# Patient Record
Sex: FEMALE | Race: WHITE | NOT HISPANIC OR LATINO | Employment: OTHER | URBAN - METROPOLITAN AREA
[De-identification: names, ages, dates, MRNs, and addresses within clinical notes are randomized per-mention and may not be internally consistent; named-entity substitution may affect disease eponyms.]

---

## 2017-01-23 ENCOUNTER — TRANSCRIBE ORDERS (OUTPATIENT)
Dept: ADMINISTRATIVE | Facility: HOSPITAL | Age: 67
End: 2017-01-23

## 2017-01-23 DIAGNOSIS — R01.1 MURMUR: Primary | ICD-10-CM

## 2017-01-26 ENCOUNTER — HOSPITAL ENCOUNTER (OUTPATIENT)
Dept: NON INVASIVE DIAGNOSTICS | Facility: MEDICAL CENTER | Age: 67
Discharge: HOME | End: 2017-01-26
Payer: MEDICARE

## 2017-01-26 DIAGNOSIS — R01.1 MURMUR: ICD-10-CM

## 2017-01-26 PROCEDURE — 93306 TTE W/DOPPLER COMPLETE: CPT

## 2017-02-14 ENCOUNTER — ALLSCRIPTS OFFICE VISIT (OUTPATIENT)
Dept: OTHER | Facility: OTHER | Age: 67
End: 2017-02-14

## 2017-07-24 ENCOUNTER — TRANSCRIBE ORDERS (OUTPATIENT)
Dept: ADMINISTRATIVE | Facility: HOSPITAL | Age: 67
End: 2017-07-24

## 2017-07-24 ENCOUNTER — APPOINTMENT (OUTPATIENT)
Dept: LAB | Facility: MEDICAL CENTER | Age: 67
End: 2017-07-24
Payer: MEDICARE

## 2017-07-24 DIAGNOSIS — I10 ESSENTIAL HYPERTENSION, MALIGNANT: ICD-10-CM

## 2017-07-24 DIAGNOSIS — E78.00 PURE HYPERCHOLESTEROLEMIA: ICD-10-CM

## 2017-07-24 DIAGNOSIS — I10 ESSENTIAL HYPERTENSION, MALIGNANT: Primary | ICD-10-CM

## 2017-07-24 LAB
ALBUMIN SERPL BCP-MCNC: 3.7 G/DL (ref 3.5–5)
ALP SERPL-CCNC: 88 U/L (ref 46–116)
ALT SERPL W P-5'-P-CCNC: 32 U/L (ref 12–78)
ANION GAP SERPL CALCULATED.3IONS-SCNC: 7 MMOL/L (ref 4–13)
AST SERPL W P-5'-P-CCNC: 25 U/L (ref 5–45)
BACTERIA UR QL AUTO: ABNORMAL /HPF
BASOPHILS # BLD AUTO: 0.03 THOUSANDS/ΜL (ref 0–0.1)
BASOPHILS NFR BLD AUTO: 1 % (ref 0–1)
BILIRUB SERPL-MCNC: 1.92 MG/DL (ref 0.2–1)
BILIRUB UR QL STRIP: NEGATIVE
BUN SERPL-MCNC: 16 MG/DL (ref 5–25)
CALCIUM SERPL-MCNC: 9.3 MG/DL (ref 8.3–10.1)
CHLORIDE SERPL-SCNC: 105 MMOL/L (ref 100–108)
CHOLEST SERPL-MCNC: 204 MG/DL (ref 50–200)
CLARITY UR: ABNORMAL
CO2 SERPL-SCNC: 26 MMOL/L (ref 21–32)
COLOR UR: YELLOW
CREAT SERPL-MCNC: 0.6 MG/DL (ref 0.6–1.3)
EOSINOPHIL # BLD AUTO: 0.07 THOUSAND/ΜL (ref 0–0.61)
EOSINOPHIL NFR BLD AUTO: 1 % (ref 0–6)
ERYTHROCYTE [DISTWIDTH] IN BLOOD BY AUTOMATED COUNT: 13.7 % (ref 11.6–15.1)
ERYTHROCYTE [SEDIMENTATION RATE] IN BLOOD: 12 MM/HOUR (ref 0–20)
GFR SERPL CREATININE-BSD FRML MDRD: 95 ML/MIN/1.73SQ M
GLUCOSE P FAST SERPL-MCNC: 98 MG/DL (ref 65–99)
GLUCOSE UR STRIP-MCNC: NEGATIVE MG/DL
HCT VFR BLD AUTO: 40.1 % (ref 34.8–46.1)
HDLC SERPL-MCNC: 67 MG/DL (ref 40–60)
HGB BLD-MCNC: 13.7 G/DL (ref 11.5–15.4)
HGB UR QL STRIP.AUTO: ABNORMAL
IRON SERPL-MCNC: 101 UG/DL (ref 50–170)
KETONES UR STRIP-MCNC: NEGATIVE MG/DL
LDH SERPL-CCNC: 179 U/L (ref 81–234)
LDLC SERPL CALC-MCNC: 122 MG/DL (ref 0–100)
LEUKOCYTE ESTERASE UR QL STRIP: ABNORMAL
LYMPHOCYTES # BLD AUTO: 1.45 THOUSANDS/ΜL (ref 0.6–4.47)
LYMPHOCYTES NFR BLD AUTO: 27 % (ref 14–44)
MCH RBC QN AUTO: 31.9 PG (ref 26.8–34.3)
MCHC RBC AUTO-ENTMCNC: 34.2 G/DL (ref 31.4–37.4)
MCV RBC AUTO: 94 FL (ref 82–98)
MONOCYTES # BLD AUTO: 0.51 THOUSAND/ΜL (ref 0.17–1.22)
MONOCYTES NFR BLD AUTO: 9 % (ref 4–12)
NEUTROPHILS # BLD AUTO: 3.36 THOUSANDS/ΜL (ref 1.85–7.62)
NEUTS SEG NFR BLD AUTO: 62 % (ref 43–75)
NITRITE UR QL STRIP: NEGATIVE
NON-SQ EPI CELLS URNS QL MICRO: ABNORMAL /HPF
NRBC BLD AUTO-RTO: 0 /100 WBCS
PH UR STRIP.AUTO: 6.5 [PH] (ref 4.5–8)
PLATELET # BLD AUTO: 292 THOUSANDS/UL (ref 149–390)
PMV BLD AUTO: 10.1 FL (ref 8.9–12.7)
POTASSIUM SERPL-SCNC: 4 MMOL/L (ref 3.5–5.3)
PROT SERPL-MCNC: 7.6 G/DL (ref 6.4–8.2)
PROT UR STRIP-MCNC: NEGATIVE MG/DL
RBC # BLD AUTO: 4.29 MILLION/UL (ref 3.81–5.12)
RBC #/AREA URNS AUTO: ABNORMAL /HPF
SODIUM SERPL-SCNC: 138 MMOL/L (ref 136–145)
SP GR UR STRIP.AUTO: 1.02 (ref 1–1.03)
TRIGL SERPL-MCNC: 76 MG/DL
TSH SERPL DL<=0.05 MIU/L-ACNC: 0.62 UIU/ML (ref 0.36–3.74)
UROBILINOGEN UR QL STRIP.AUTO: 0.2 E.U./DL
WBC # BLD AUTO: 5.43 THOUSAND/UL (ref 4.31–10.16)
WBC #/AREA URNS AUTO: ABNORMAL /HPF

## 2017-07-24 PROCEDURE — 83540 ASSAY OF IRON: CPT

## 2017-07-24 PROCEDURE — 83615 LACTATE (LD) (LDH) ENZYME: CPT

## 2017-07-24 PROCEDURE — 80061 LIPID PANEL: CPT

## 2017-07-24 PROCEDURE — 85652 RBC SED RATE AUTOMATED: CPT

## 2017-07-24 PROCEDURE — 85025 COMPLETE CBC W/AUTO DIFF WBC: CPT

## 2017-07-24 PROCEDURE — 81001 URINALYSIS AUTO W/SCOPE: CPT | Performed by: INTERNAL MEDICINE

## 2017-07-24 PROCEDURE — 80053 COMPREHEN METABOLIC PANEL: CPT

## 2017-07-24 PROCEDURE — 36415 COLL VENOUS BLD VENIPUNCTURE: CPT

## 2017-07-24 PROCEDURE — 84443 ASSAY THYROID STIM HORMONE: CPT

## 2017-10-10 ENCOUNTER — TRANSCRIBE ORDERS (OUTPATIENT)
Dept: ADMINISTRATIVE | Facility: HOSPITAL | Age: 67
End: 2017-10-10

## 2017-10-10 DIAGNOSIS — R10.11 ABDOMINAL PAIN, RIGHT UPPER QUADRANT: Primary | ICD-10-CM

## 2017-10-17 ENCOUNTER — HOSPITAL ENCOUNTER (OUTPATIENT)
Dept: RADIOLOGY | Facility: MEDICAL CENTER | Age: 67
Discharge: HOME | End: 2017-10-17
Payer: MEDICARE

## 2017-10-17 DIAGNOSIS — R10.11 ABDOMINAL PAIN, RIGHT UPPER QUADRANT: ICD-10-CM

## 2017-10-17 PROCEDURE — 76705 ECHO EXAM OF ABDOMEN: CPT

## 2017-11-08 ENCOUNTER — APPOINTMENT (OUTPATIENT)
Dept: LAB | Facility: MEDICAL CENTER | Age: 67
End: 2017-11-08
Payer: MEDICARE

## 2017-11-08 ENCOUNTER — TRANSCRIBE ORDERS (OUTPATIENT)
Dept: ADMINISTRATIVE | Facility: HOSPITAL | Age: 67
End: 2017-11-08

## 2017-11-08 DIAGNOSIS — R10.11 RIGHT UPPER QUADRANT PAIN: Primary | ICD-10-CM

## 2017-11-08 LAB
ALBUMIN SERPL BCP-MCNC: 3.7 G/DL (ref 3.5–5)
ALP SERPL-CCNC: 89 U/L (ref 46–116)
ALT SERPL W P-5'-P-CCNC: 41 U/L (ref 12–78)
AST SERPL W P-5'-P-CCNC: 23 U/L (ref 5–45)
BILIRUB DIRECT SERPL-MCNC: 0.3 MG/DL (ref 0–0.2)
BILIRUB SERPL-MCNC: 1.48 MG/DL (ref 0.2–1)
PROT SERPL-MCNC: 7.5 G/DL (ref 6.4–8.2)

## 2017-11-08 PROCEDURE — 36415 COLL VENOUS BLD VENIPUNCTURE: CPT | Performed by: INTERNAL MEDICINE

## 2017-11-08 PROCEDURE — 80076 HEPATIC FUNCTION PANEL: CPT | Performed by: INTERNAL MEDICINE

## 2018-01-13 VITALS
WEIGHT: 228 LBS | HEIGHT: 63 IN | SYSTOLIC BLOOD PRESSURE: 122 MMHG | DIASTOLIC BLOOD PRESSURE: 84 MMHG | BODY MASS INDEX: 40.4 KG/M2

## 2018-04-10 ENCOUNTER — LAB (OUTPATIENT)
Dept: LAB | Facility: MEDICAL CENTER | Age: 68
End: 2018-04-10
Payer: MEDICARE

## 2018-04-10 ENCOUNTER — TRANSCRIBE ORDERS (OUTPATIENT)
Dept: ADMINISTRATIVE | Facility: HOSPITAL | Age: 68
End: 2018-04-10

## 2018-04-10 DIAGNOSIS — I10 ESSENTIAL HYPERTENSION, BENIGN: Primary | ICD-10-CM

## 2018-04-10 DIAGNOSIS — E78.00 PURE HYPERCHOLESTEROLEMIA: ICD-10-CM

## 2018-04-10 LAB
ALBUMIN SERPL BCP-MCNC: 3.8 G/DL (ref 3.5–5)
ALP SERPL-CCNC: 108 U/L (ref 46–116)
ALT SERPL W P-5'-P-CCNC: 33 U/L (ref 12–78)
ANION GAP SERPL CALCULATED.3IONS-SCNC: 3 MMOL/L (ref 4–13)
AST SERPL W P-5'-P-CCNC: 27 U/L (ref 5–45)
BACTERIA UR QL AUTO: ABNORMAL /HPF
BASOPHILS # BLD AUTO: 0.02 THOUSANDS/ΜL (ref 0–0.1)
BASOPHILS NFR BLD AUTO: 0 % (ref 0–1)
BILIRUB SERPL-MCNC: 1.4 MG/DL (ref 0.2–1)
BILIRUB UR QL STRIP: NEGATIVE
BUN SERPL-MCNC: 19 MG/DL (ref 5–25)
CALCIUM SERPL-MCNC: 9.5 MG/DL
CHLORIDE SERPL-SCNC: 108 MMOL/L (ref 100–108)
CHOLEST SERPL-MCNC: 187 MG/DL (ref 50–200)
CLARITY UR: CLEAR
CO2 SERPL-SCNC: 29 MMOL/L (ref 21–32)
COLOR UR: YELLOW
CREAT SERPL-MCNC: 0.61 MG/DL (ref 0.6–1.3)
CRP SERPL QL: <3 MG/L
EOSINOPHIL # BLD AUTO: 0.06 THOUSAND/ΜL (ref 0–0.61)
EOSINOPHIL NFR BLD AUTO: 1 % (ref 0–6)
ERYTHROCYTE [DISTWIDTH] IN BLOOD BY AUTOMATED COUNT: 13.7 % (ref 11.6–15.1)
ERYTHROCYTE [SEDIMENTATION RATE] IN BLOOD: 20 MM/HOUR (ref 0–20)
GFR SERPL CREATININE-BSD FRML MDRD: 94 ML/MIN/1.73SQ M
GLUCOSE P FAST SERPL-MCNC: 95 MG/DL (ref 65–99)
GLUCOSE UR STRIP-MCNC: NEGATIVE MG/DL
HCT VFR BLD AUTO: 43.1 % (ref 34.8–46.1)
HDLC SERPL-MCNC: 64 MG/DL (ref 40–60)
HGB BLD-MCNC: 13.9 G/DL (ref 11.5–15.4)
HGB UR QL STRIP.AUTO: ABNORMAL
HYALINE CASTS #/AREA URNS LPF: ABNORMAL /LPF
IRON SERPL-MCNC: 122 UG/DL (ref 50–170)
KETONES UR STRIP-MCNC: NEGATIVE MG/DL
LDH SERPL-CCNC: 171 U/L (ref 81–234)
LDLC SERPL CALC-MCNC: 104 MG/DL (ref 0–100)
LEUKOCYTE ESTERASE UR QL STRIP: NEGATIVE
LYMPHOCYTES # BLD AUTO: 1.56 THOUSANDS/ΜL (ref 0.6–4.47)
LYMPHOCYTES NFR BLD AUTO: 31 % (ref 14–44)
MCH RBC QN AUTO: 31.3 PG (ref 26.8–34.3)
MCHC RBC AUTO-ENTMCNC: 32.3 G/DL (ref 31.4–37.4)
MCV RBC AUTO: 97 FL (ref 82–98)
MONOCYTES # BLD AUTO: 0.49 THOUSAND/ΜL (ref 0.17–1.22)
MONOCYTES NFR BLD AUTO: 10 % (ref 4–12)
NEUTROPHILS # BLD AUTO: 2.89 THOUSANDS/ΜL (ref 1.85–7.62)
NEUTS SEG NFR BLD AUTO: 58 % (ref 43–75)
NITRITE UR QL STRIP: NEGATIVE
NON-SQ EPI CELLS URNS QL MICRO: ABNORMAL /HPF
NONHDLC SERPL-MCNC: 123 MG/DL
NRBC BLD AUTO-RTO: 1 /100 WBCS
PH UR STRIP.AUTO: 7 [PH] (ref 4.5–8)
PLATELET # BLD AUTO: 313 THOUSANDS/UL (ref 149–390)
PMV BLD AUTO: 9.9 FL (ref 8.9–12.7)
POTASSIUM SERPL-SCNC: 4.4 MMOL/L (ref 3.5–5.3)
PROT SERPL-MCNC: 7.8 G/DL (ref 6.4–8.2)
PROT UR STRIP-MCNC: NEGATIVE MG/DL
RBC # BLD AUTO: 4.44 MILLION/UL (ref 3.81–5.12)
RBC #/AREA URNS AUTO: ABNORMAL /HPF
SODIUM SERPL-SCNC: 140 MMOL/L (ref 136–145)
SP GR UR STRIP.AUTO: 1.01 (ref 1–1.03)
TRIGL SERPL-MCNC: 94 MG/DL
TSH SERPL DL<=0.05 MIU/L-ACNC: 0.53 UIU/ML (ref 0.36–3.74)
UROBILINOGEN UR QL STRIP.AUTO: 0.2 E.U./DL
WBC # BLD AUTO: 5.03 THOUSAND/UL (ref 4.31–10.16)
WBC #/AREA URNS AUTO: ABNORMAL /HPF

## 2018-04-10 PROCEDURE — 83615 LACTATE (LD) (LDH) ENZYME: CPT | Performed by: INTERNAL MEDICINE

## 2018-04-10 PROCEDURE — 84443 ASSAY THYROID STIM HORMONE: CPT | Performed by: INTERNAL MEDICINE

## 2018-04-10 PROCEDURE — 82306 VITAMIN D 25 HYDROXY: CPT | Performed by: INTERNAL MEDICINE

## 2018-04-10 PROCEDURE — 85652 RBC SED RATE AUTOMATED: CPT | Performed by: INTERNAL MEDICINE

## 2018-04-10 PROCEDURE — 80061 LIPID PANEL: CPT | Performed by: INTERNAL MEDICINE

## 2018-04-10 PROCEDURE — 86140 C-REACTIVE PROTEIN: CPT | Performed by: INTERNAL MEDICINE

## 2018-04-10 PROCEDURE — 85025 COMPLETE CBC W/AUTO DIFF WBC: CPT | Performed by: INTERNAL MEDICINE

## 2018-04-10 PROCEDURE — 81001 URINALYSIS AUTO W/SCOPE: CPT | Performed by: INTERNAL MEDICINE

## 2018-04-10 PROCEDURE — 86664 EPSTEIN-BARR NUCLEAR ANTIGEN: CPT | Performed by: INTERNAL MEDICINE

## 2018-04-10 PROCEDURE — 86038 ANTINUCLEAR ANTIBODIES: CPT | Performed by: INTERNAL MEDICINE

## 2018-04-10 PROCEDURE — 80053 COMPREHEN METABOLIC PANEL: CPT | Performed by: INTERNAL MEDICINE

## 2018-04-10 PROCEDURE — 36415 COLL VENOUS BLD VENIPUNCTURE: CPT | Performed by: INTERNAL MEDICINE

## 2018-04-10 PROCEDURE — 83540 ASSAY OF IRON: CPT | Performed by: INTERNAL MEDICINE

## 2018-04-10 PROCEDURE — 82232 ASSAY OF BETA-2 PROTEIN: CPT | Performed by: INTERNAL MEDICINE

## 2018-04-11 LAB
EBV NA IGG SER IA-ACNC: >600 U/ML (ref 0–17.9)
RYE IGE QN: NEGATIVE

## 2018-04-12 LAB — B2 MICROGLOB SERPL-MCNC: 1.6 MG/L (ref 0.6–2.4)

## 2018-04-14 LAB
25(OH)D2 SERPL-MCNC: <1 NG/ML
25(OH)D3 SERPL-MCNC: 50 NG/ML
25(OH)D3+25(OH)D2 SERPL-MCNC: 50 NG/ML

## 2018-05-29 ENCOUNTER — CONSULT (OUTPATIENT)
Dept: VASCULAR SURGERY | Facility: CLINIC | Age: 68
End: 2018-05-29
Payer: MEDICARE

## 2018-05-29 VITALS
HEART RATE: 80 BPM | DIASTOLIC BLOOD PRESSURE: 78 MMHG | SYSTOLIC BLOOD PRESSURE: 146 MMHG | RESPIRATION RATE: 22 BRPM | HEIGHT: 64 IN | BODY MASS INDEX: 40.12 KG/M2 | WEIGHT: 235 LBS | TEMPERATURE: 98.1 F

## 2018-05-29 DIAGNOSIS — Z98.890 HISTORY OF VARICOSE VEIN STRIPPING: ICD-10-CM

## 2018-05-29 DIAGNOSIS — I83.813 VARICOSE VEINS OF BILATERAL LOWER EXTREMITIES WITH PAIN: Primary | ICD-10-CM

## 2018-05-29 DIAGNOSIS — E66.01 MORBID OBESITY DUE TO EXCESS CALORIES (HCC): ICD-10-CM

## 2018-05-29 PROCEDURE — 99204 OFFICE O/P NEW MOD 45 MIN: CPT | Performed by: NURSE PRACTITIONER

## 2018-05-29 RX ORDER — LOSARTAN POTASSIUM 100 MG/1
TABLET ORAL
COMMUNITY
Start: 2018-03-26 | End: 2018-09-05 | Stop reason: ALTCHOICE

## 2018-05-29 RX ORDER — LINACLOTIDE 72 UG/1
72 CAPSULE, GELATIN COATED ORAL
COMMUNITY
Start: 2018-04-02

## 2018-05-29 RX ORDER — LEVOTHYROXINE SODIUM ANHYDROUS 200 UG/5ML
INJECTION, POWDER, LYOPHILIZED, FOR SOLUTION INTRAVENOUS
COMMUNITY
End: 2018-06-25

## 2018-05-29 RX ORDER — LATANOPROST 50 UG/ML
1 SOLUTION/ DROPS OPHTHALMIC
COMMUNITY

## 2018-05-29 RX ORDER — LEVOTHYROXINE SODIUM 112 MCG
100 TABLET ORAL DAILY
COMMUNITY
Start: 2018-04-02

## 2018-05-29 RX ORDER — ALPRAZOLAM 0.5 MG/1
0.5 TABLET ORAL
Refills: 5 | COMMUNITY
Start: 2018-04-28

## 2018-05-29 RX ORDER — CEFDINIR 300 MG/1
300 CAPSULE ORAL EVERY 12 HOURS
Refills: 1 | COMMUNITY
Start: 2018-04-20 | End: 2018-06-25

## 2018-05-29 NOTE — PROGRESS NOTES
Assessment/Plan:    78 y/o morbidly obese female with HTN, hypothyroidism and lower extremity varicosities with remote hx of vein stripping by Dr Joyce Green, returns to office today with complaint of bilateral lower extremity pain  -Bilateral lower extremity varicosities, L>R  She is experiencing heaviness and fatigue to bilateral legs, as well as pain localized over varicosity at distal calf consistent with venous pain  However, she is also experiencing right groin pain more consistent with musculoskeletal origin   -Advised the daily use of 20-30 mmHg compression stockings to help manage symptoms  Also discussed the benefits of weight loss, periodic elevation and regular physical activity   -She will begin a 90 day trial of conservative therapy  She should pay close attention to her symptoms during that time   -She will have a venous study to assess for reflux and return to the office with a physician for review     Diagnoses and all orders for this visit:    Varicose veins of bilateral lower extremities with pain  -     Compression Stocking  -     VAS reflux lower limb venous duplex study with reflux assessment, complete bilateral; Future    Morbid obesity due to excess calories ([facility] [address] )    History of varicose vein stripping    Other orders  -     ALPRAZolam (XANAX) 0 5 mg tablet; Take 0 5 mg by mouth every 12 (twelve) hours  -     cefdinir (OMNICEF) 300 mg capsule; Take 300 mg by mouth every 12 (twelve) hours  -     Linaclotide (LINZESS) 72 MCG CAPS; Take by mouth  -     Levothyroxine Sodium 200 MCG; Inject as directed  -     SYNTHROID 112 MCG tablet; Take by mouth daily    -     LINZESS 145 MCG CAPS;   -     losartan (COZAAR) 100 MG tablet;   -     latanoprost (XALATAN) 0 005 % ophthalmic solution; 1 drop daily at bedtime          Subjective: Patient is here as a new patient to discuss varicose veins referred by Dr Radha Hand  Patient complains of pain while walking  Patient states this problem started in her 35s  Patient does wear compression stockings and elevates and exercises  Patient complains of bulging veins and achy/tired/heaviness feeling  Patient states both her legs are bad and it impacts her walking regimen  Patient ID: Harish Kuo is a 79 y o  female  This patient is new to our practice, referred by Dr Escoto Greek  She is here for evaluation of bilateral lower extremity varicosities  She reports a history of lower extremity vein stripping by Dr Monica Sullivan in approximately 2001  She states that she has been experiencing aching heavy pain to bilateral legs  She is also experiencing right groin pain when rising to a standing position and cramping pain to the left thigh, often times occurring at night or in the early hours of the morning  She states that she began wearing compression stockings, given to her after right knee replacement, which have helped relieve some of her leg heaviness  Swelling also gives her some relief of heavy/tired pain  She denies any significant swelling  No tissue loss  The following portions of the patient's history were reviewed and updated as appropriate: allergies, current medications, past family history, past medical history, past social history, past surgical history and problem list     Review of Systems   Constitutional: Positive for fatigue  HENT: Positive for ear pain, hearing loss, postnasal drip and tinnitus  Eyes: Positive for pain  Respiratory: Positive for cough  Cardiovascular:        Pain ful veins   Gastrointestinal: Positive for abdominal pain, anal bleeding, constipation and diarrhea  Musculoskeletal: Positive for arthralgias, back pain, joint swelling and neck pain  Leg pain   Skin: Negative  Allergic/Immunologic: Positive for food allergies  Neurological: Positive for light-headedness and headaches  Hematological: Negative  Psychiatric/Behavioral: Negative            Objective:     Physical Exam   Constitutional: She is oriented to person, place, and time  No distress  Obese   HENT:   Head: Normocephalic and atraumatic  Eyes: EOM are normal    Neck: Neck supple  No JVD present  Cardiovascular: Normal rate, regular rhythm and normal heart sounds  Pulses:       Dorsalis pedis pulses are 2+ on the right side, and 2+ on the left side  Truncal varicosity left leg, spider veins bilaterally   Pulmonary/Chest: Effort normal  No respiratory distress  Abdominal: Soft  Obese   Musculoskeletal: Normal range of motion  She exhibits no edema  Neurological: She is alert and oriented to person, place, and time  Skin: Skin is warm and dry  Psychiatric: She has a normal mood and affect  Vitals:    05/29/18 1322   BP: 146/78   BP Location: Left arm   Pulse: 80   Resp: 22   Temp: 98 1 °F (36 7 °C)   Weight: 107 kg (235 lb)   Height: 5' 3 75" (1 619 m)       There is no problem list on file for this patient  Past Surgical History:   Procedure Laterality Date    CHOLECYSTECTOMY      HERNIA REPAIR  2010    REPLACEMENT TOTAL KNEE  2013    VEIN SURGERY Bilateral     2002 Dr Best Perez       Family History   Problem Relation Age of Onset    Heart failure Mother     Lung cancer Father     Hypertension Sister     Hypertension Brother        Social History     Social History    Marital status: /Civil Union     Spouse name: N/A    Number of children: N/A    Years of education: N/A     Occupational History    Not on file       Social History Main Topics    Smoking status: Former Smoker    Smokeless tobacco: Never Used      Comment: pt quit in her 35s    Alcohol use No    Drug use: No    Sexual activity: Not on file     Other Topics Concern    Not on file     Social History Narrative    No narrative on file       Allergies   Allergen Reactions    Food Throat Swelling     FAT FREE SALAD DRESSING    Lactose Intolerance [Lactase] Abdominal Pain    Meloxicam Other (See Comments)     Other reaction(s): nose bleeds    Niacin Facial Swelling     Other reaction(s): Unknown Allergic Reaction    Norco [Hydrocodone-Acetaminophen] Other (See Comments)     Heat intolerance, flushing         Current Outpatient Prescriptions:     ALPRAZolam (XANAX) 0 5 mg tablet, Take 0 5 mg by mouth every 12 (twelve) hours, Disp: , Rfl: 5    latanoprost (XALATAN) 0 005 % ophthalmic solution, 1 drop daily at bedtime, Disp: , Rfl:     LINZESS 145 MCG CAPS, , Disp: , Rfl:     losartan (COZAAR) 100 MG tablet, , Disp: , Rfl:     SYNTHROID 112 MCG tablet, Take by mouth daily  , Disp: , Rfl:     cefdinir (OMNICEF) 300 mg capsule, Take 300 mg by mouth every 12 (twelve) hours, Disp: , Rfl: 1    Levothyroxine Sodium 200 MCG, Inject as directed, Disp: , Rfl:     Linaclotide (LINZESS) 72 MCG CAPS, Take by mouth, Disp: , Rfl:

## 2018-06-25 ENCOUNTER — OFFICE VISIT (OUTPATIENT)
Dept: URGENT CARE | Facility: MEDICAL CENTER | Age: 68
End: 2018-06-25
Payer: MEDICARE

## 2018-06-25 VITALS
TEMPERATURE: 97.9 F | OXYGEN SATURATION: 97 % | SYSTOLIC BLOOD PRESSURE: 132 MMHG | HEIGHT: 63 IN | BODY MASS INDEX: 41.29 KG/M2 | WEIGHT: 233 LBS | DIASTOLIC BLOOD PRESSURE: 90 MMHG | RESPIRATION RATE: 20 BRPM | HEART RATE: 108 BPM

## 2018-06-25 DIAGNOSIS — M54.32 SCIATICA OF LEFT SIDE: Primary | ICD-10-CM

## 2018-06-25 PROCEDURE — 99213 OFFICE O/P EST LOW 20 MIN: CPT | Performed by: FAMILY MEDICINE

## 2018-06-25 PROCEDURE — G0463 HOSPITAL OUTPT CLINIC VISIT: HCPCS | Performed by: FAMILY MEDICINE

## 2018-06-25 RX ORDER — CYCLOBENZAPRINE HCL 5 MG
5 TABLET ORAL 3 TIMES DAILY PRN
Qty: 30 TABLET | Refills: 0 | Status: SHIPPED | OUTPATIENT
Start: 2018-06-25 | End: 2018-12-03

## 2018-06-25 NOTE — PATIENT INSTRUCTIONS
Take 400 mg Advil every 4 hours  Take with food  Take flexeril as needed for muscle spasm  Alternate ice and moist heat for 20 minutes every 4 hours  Gentle stretching as tolerated  Follow up with your PCP for continued symptoms  Go to the ER for any distress  Lower Back Exercises   AMBULATORY CARE:   Lower back exercises  help heal and strengthen your back muscles to prevent another injury  Ask your healthcare provider if you need to see a physical therapist for more advanced exercises  Seek care immediately if:   · You have severe pain that prevents you from moving  Contact your healthcare provider if:   · Your pain becomes worse  · You have new pain  · You have questions or concerns about your condition or care  Do lower back exercises safely:   · Do the exercises on a mat or firm surface  (not on a bed) to support your spine and prevent low back pain  · Move slowly and smoothly  Avoid fast or jerky motions  · Breathe normally  Do not hold your breath  · Stop if you feel pain  It is normal to feel some discomfort at first  Regular exercise will help decrease your discomfort over time  Lower back exercises: Your healthcare provider may recommend that you do back exercises 10 to 30 minutes each day  He may also recommend that you do exercises 1 to 3 times each day  Ask your healthcare provider which exercises are best for you and how often to do them  · Ankle pumps:  Lie on your back  Move your foot up (with your toes pointing toward your head)  Then, move your foot down (with your toes pointing away from you)  Repeat this exercise 10 times on each side  · Heel slides:  Lie on your back  Slowly bend one leg and then straighten it  Next, bend the other leg and then straighten it  Repeat 10 times on each side  · Pelvic tilt:  Lie on your back with your knees bent and feet flat on the floor  Place your arms in a relaxed position beside your body   Tighten the muscles of your abdomen and flatten your back against the floor  Hold for 5 seconds  Repeat 5 times  · Back stretch:  Lie on your back with your hands behind your head  Bend your knees and turn the lower half of your body to one side  Hold this position for 10 seconds  Repeat 3 times on each side  · Straight leg raises:  Lie on your back with one leg straight  Bend the other knee  Tighten your abdomen and then slowly lift the straight leg up about 6 to 12 inches off the floor  Hold for 1 to 5 seconds  Lower your leg slowly  Repeat 10 times on each leg  · Knee-to-chest:  Lie on your back with your knees bent and feet flat on the floor  Pull one of your knees toward your chest and hold it there for 5 seconds  Return your leg to the starting position  Lift the other knee toward your chest and hold for 5 seconds  Do this 5 times on each side  · Cat and camel:  Place your hands and knees on the floor  Arch your back upward toward the ceiling and lower your head  Round out your spine as much as you can  Hold for 5 seconds  Lift your head upward and push your chest downward toward the floor  Hold for 5 seconds  Do 3 sets or as directed  · Wall squats:  Stand with your back against a wall  Tighten the muscles of your abdomen  Slowly lower your body until your knees are bent at a 45 degree angle  Hold this position for 5 seconds  Slowly move back up to a standing position  Repeat 10 times  · Curl up:  Lie on your back with your knees bent and feet flat on the floor  Place your hands, palms down, underneath the curve in your lower back  Next, with your elbows on the floor, lift your shoulders and chest 2 to 3 inches  Keep your head in line with your shoulders  Hold this position for 5 seconds  When you can do this exercise without pain for 10 to 15 seconds, you may add a rotation   While your shoulders and chest are lifted off the ground, turn slightly to the left and hold  Repeat on the other side  · Bird dog:  Place your hands and knees on the floor  Keep your wrists directly below your shoulders and your knees directly below your hips  Pull your belly button in toward your spine  Do not flatten or arch your back  Tighten your abdominal muscles  Raise one arm straight out so that it is aligned with your head  Next, raise the leg opposite your arm  Hold this position for 15 seconds  Lower your arm and leg slowly and change sides  Do 5 sets  © 2017 [address]  Information is for End User's use only and may not be sold, redistributed or otherwise used for commercial purposes  All illustrations and images included in CareNotes® are the copyrighted property of [employer]  or [employer]  The above information is an  only  It is not intended as medical advice for individual conditions or treatments  Talk to your doctor, nurse or pharmacist before following any medical regimen to see if it is safe and effective for you

## 2018-06-25 NOTE — PROGRESS NOTES
[facility]        NAME: Chrissie Bowman is a 79 y o  female  : 1950    MRN: [id number]  DATE: 2018  TIME: 11:09 AM    Assessment and Plan   Sciatica of left side [M54 32]  1  Sciatica of left side  cyclobenzaprine (FLEXERIL) 5 mg tablet         Patient Instructions     Take 400 mg Advil every 4 hours  Take with food  Take flexeril as needed for muscle spasm  Alternate ice and moist heat for 20 minutes every 4 hours  Gentle stretching as tolerated  Follow up with PCP in 3-5 days  Proceed to  ER if symptoms worsen  Chief Complaint     Chief Complaint   Patient presents with    sciatica pain     x6days , fell last friday , but pain started 6 days ago  starts on left side of buttocks and radiates down leg into foot and bottom of foot is numb         History of Present Illness       This is a 79year old female presenting for left sciatic pain x 6 days  She states that she did fall 5 days prior to the pain starting but does not think that this is related  She states that the pain is located throughout the left buttock and travels down the back of the leg and into the foot  She is having some intermittent tingling of the foot as well  No saddle anesthesia, midline back pain, or incontinence  She has had the same thing in the past on the right side  She has been taking 200 mg of advil intermittently and using a heating pad  Review of Systems   Review of Systems   Constitutional: Negative for diaphoresis, fatigue and fever  Respiratory: Negative for shortness of breath  Cardiovascular: Negative for palpitations  Gastrointestinal: Negative for nausea, rectal pain and vomiting  Musculoskeletal: Positive for arthralgias, back pain and myalgias  Negative for gait problem, joint swelling, neck pain and neck stiffness  Skin: Negative for wound  Neurological: Positive for numbness  Negative for dizziness and weakness           Current Medications       Current Outpatient Prescriptions:     ALPRAZolam (XANAX) 0 5 mg tablet, Take 0 5 mg by mouth every 12 (twelve) hours, Disp: , Rfl: 5    latanoprost (XALATAN) 0 005 % ophthalmic solution, 1 drop daily at bedtime, Disp: , Rfl:     Linaclotide (LINZESS) 72 MCG CAPS, Take by mouth, Disp: , Rfl:     LINZESS 145 MCG CAPS, , Disp: , Rfl:     losartan (COZAAR) 100 MG tablet, , Disp: , Rfl:     SYNTHROID 112 MCG tablet, Take by mouth daily  , Disp: , Rfl:     cyclobenzaprine (FLEXERIL) 5 mg tablet, Take 1 tablet (5 mg total) by mouth 3 (three) times a day as needed for muscle spasms, Disp: 30 tablet, Rfl: 0    Current Allergies     Allergies as of 06/25/2018 - Reviewed 06/25/2018   Allergen Reaction Noted    Food Throat Swelling 05/29/2018    Lactose intolerance [lactase] Abdominal Pain 05/29/2018    Meloxicam Other (See Comments) 02/14/2017    Niacin Facial Swelling 02/14/2017    Norco [hydrocodone-acetaminophen] Other (See Comments) 05/29/2018            The following portions of the patient's history were reviewed and updated as appropriate: allergies, current medications, past family history, past medical history, past social history, past surgical history and problem list      Past Medical History:   Diagnosis Date    Anxiety     Fibromyalgia     Hypothyroidism     IBS (irritable bowel syndrome)        Past Surgical History:   Procedure Laterality Date    CHOLECYSTECTOMY      HERNIA REPAIR  2010    REPLACEMENT TOTAL KNEE  2013    VEIN SURGERY Bilateral     2002 Dr Coleen Esteban       Family History   Problem Relation Age of Onset    Heart failure Mother     Lung cancer Father     Hypertension Sister     Hypertension Brother          Medications have been verified          Objective   /90   Pulse (!) 108   Temp 97 9 °F (36 6 °C) (Temporal)   Resp 20   Ht 5' 3" (1 6 m)   Wt 106 kg (233 lb)   SpO2 97%   BMI 41 27 kg/m²        Physical Exam     Physical Exam   Constitutional: She appears well-developed and well-nourished  No distress  HENT:   Head: Normocephalic and atraumatic  Nose: Nose normal    Eyes: Conjunctivae and EOM are normal  Pupils are equal, round, and reactive to light  Cardiovascular: Normal rate, regular rhythm and normal heart sounds  Pulmonary/Chest: Effort normal and breath sounds normal  No respiratory distress  She has no wheezes  She has no rales  Musculoskeletal:   There is no midline back pain  There is TTP of the left paraspinal muscles and hip  Sensation intact throughout the leg and foot  2+ dorsalis pedis and posterior tibialis pulses  FROM of the leg and foot, 5/5 strength b/l LE  + SLR on the left  Neurological: She is alert  Skin: Skin is warm and dry  She is not diaphoretic  Nursing note and vitals reviewed

## 2018-07-21 ENCOUNTER — TRANSCRIBE ORDERS (OUTPATIENT)
Dept: ADMINISTRATIVE | Facility: HOSPITAL | Age: 68
End: 2018-07-21

## 2018-07-21 ENCOUNTER — APPOINTMENT (OUTPATIENT)
Dept: LAB | Facility: MEDICAL CENTER | Age: 68
End: 2018-07-21
Payer: MEDICARE

## 2018-07-21 DIAGNOSIS — R53.83 FATIGUE, UNSPECIFIED TYPE: Primary | ICD-10-CM

## 2018-07-21 DIAGNOSIS — R53.83 FATIGUE, UNSPECIFIED TYPE: ICD-10-CM

## 2018-07-21 DIAGNOSIS — K76.89 LIVER CYST: ICD-10-CM

## 2018-07-21 LAB
ALBUMIN SERPL BCP-MCNC: 3.8 G/DL (ref 3.5–5)
ALP SERPL-CCNC: 91 U/L (ref 46–116)
ALT SERPL W P-5'-P-CCNC: 33 U/L (ref 12–78)
ANION GAP SERPL CALCULATED.3IONS-SCNC: 4 MMOL/L (ref 4–13)
AST SERPL W P-5'-P-CCNC: 27 U/L (ref 5–45)
BASOPHILS # BLD AUTO: 0.03 THOUSANDS/ΜL (ref 0–0.1)
BASOPHILS NFR BLD AUTO: 1 % (ref 0–1)
BILIRUB SERPL-MCNC: 1.38 MG/DL (ref 0.2–1)
BUN SERPL-MCNC: 22 MG/DL (ref 5–25)
CALCIUM SERPL-MCNC: 9.2 MG/DL (ref 8.3–10.1)
CHLORIDE SERPL-SCNC: 107 MMOL/L (ref 100–108)
CO2 SERPL-SCNC: 28 MMOL/L (ref 21–32)
CREAT SERPL-MCNC: 0.6 MG/DL (ref 0.6–1.3)
EOSINOPHIL # BLD AUTO: 0.07 THOUSAND/ΜL (ref 0–0.61)
EOSINOPHIL NFR BLD AUTO: 1 % (ref 0–6)
ERYTHROCYTE [DISTWIDTH] IN BLOOD BY AUTOMATED COUNT: 13 % (ref 11.6–15.1)
ERYTHROCYTE [SEDIMENTATION RATE] IN BLOOD: 18 MM/HOUR (ref 0–20)
GFR SERPL CREATININE-BSD FRML MDRD: 95 ML/MIN/1.73SQ M
GLUCOSE P FAST SERPL-MCNC: 95 MG/DL (ref 65–99)
HCT VFR BLD AUTO: 42 % (ref 34.8–46.1)
HGB BLD-MCNC: 13.8 G/DL (ref 11.5–15.4)
IMM GRANULOCYTES # BLD AUTO: 0.01 THOUSAND/UL (ref 0–0.2)
IMM GRANULOCYTES NFR BLD AUTO: 0 % (ref 0–2)
LYMPHOCYTES # BLD AUTO: 1.89 THOUSANDS/ΜL (ref 0.6–4.47)
LYMPHOCYTES NFR BLD AUTO: 33 % (ref 14–44)
MCH RBC QN AUTO: 31.9 PG (ref 26.8–34.3)
MCHC RBC AUTO-ENTMCNC: 32.9 G/DL (ref 31.4–37.4)
MCV RBC AUTO: 97 FL (ref 82–98)
MONOCYTES # BLD AUTO: 0.54 THOUSAND/ΜL (ref 0.17–1.22)
MONOCYTES NFR BLD AUTO: 10 % (ref 4–12)
NEUTROPHILS # BLD AUTO: 3.16 THOUSANDS/ΜL (ref 1.85–7.62)
NEUTS SEG NFR BLD AUTO: 55 % (ref 43–75)
NRBC BLD AUTO-RTO: 0 /100 WBCS
PLATELET # BLD AUTO: 290 THOUSANDS/UL (ref 149–390)
PMV BLD AUTO: 10.1 FL (ref 8.9–12.7)
POTASSIUM SERPL-SCNC: 4.5 MMOL/L (ref 3.5–5.3)
PROT SERPL-MCNC: 7.7 G/DL (ref 6.4–8.2)
RBC # BLD AUTO: 4.33 MILLION/UL (ref 3.81–5.12)
SODIUM SERPL-SCNC: 139 MMOL/L (ref 136–145)
T4 FREE SERPL-MCNC: 1.47 NG/DL (ref 0.76–1.46)
TSH SERPL DL<=0.05 MIU/L-ACNC: 0.31 UIU/ML (ref 0.36–3.74)
WBC # BLD AUTO: 5.7 THOUSAND/UL (ref 4.31–10.16)

## 2018-07-21 PROCEDURE — 84439 ASSAY OF FREE THYROXINE: CPT

## 2018-07-21 PROCEDURE — 85025 COMPLETE CBC W/AUTO DIFF WBC: CPT

## 2018-07-21 PROCEDURE — 85652 RBC SED RATE AUTOMATED: CPT

## 2018-07-21 PROCEDURE — 36415 COLL VENOUS BLD VENIPUNCTURE: CPT

## 2018-07-21 PROCEDURE — 84443 ASSAY THYROID STIM HORMONE: CPT

## 2018-07-21 PROCEDURE — 80053 COMPREHEN METABOLIC PANEL: CPT

## 2018-08-01 ENCOUNTER — HOSPITAL ENCOUNTER (OUTPATIENT)
Dept: RADIOLOGY | Facility: MEDICAL CENTER | Age: 68
Discharge: HOME | End: 2018-08-01
Payer: MEDICARE

## 2018-08-01 DIAGNOSIS — K76.89 LIVER CYST: ICD-10-CM

## 2018-08-01 PROCEDURE — 74176 CT ABD & PELVIS W/O CONTRAST: CPT

## 2018-08-29 ENCOUNTER — HOSPITAL ENCOUNTER (OUTPATIENT)
Dept: NON INVASIVE DIAGNOSTICS | Facility: CLINIC | Age: 68
Discharge: HOME | End: 2018-08-29
Payer: MEDICARE

## 2018-08-29 DIAGNOSIS — I83.813 VARICOSE VEINS OF BILATERAL LOWER EXTREMITIES WITH PAIN: ICD-10-CM

## 2018-08-29 PROCEDURE — 93970 EXTREMITY STUDY: CPT

## 2018-09-05 ENCOUNTER — OFFICE VISIT (OUTPATIENT)
Dept: VASCULAR SURGERY | Facility: CLINIC | Age: 68
End: 2018-09-05
Payer: MEDICARE

## 2018-09-05 VITALS
RESPIRATION RATE: 18 BRPM | HEIGHT: 65 IN | SYSTOLIC BLOOD PRESSURE: 108 MMHG | DIASTOLIC BLOOD PRESSURE: 80 MMHG | HEART RATE: 76 BPM | BODY MASS INDEX: 38.49 KG/M2 | WEIGHT: 231 LBS | TEMPERATURE: 98.9 F

## 2018-09-05 DIAGNOSIS — E66.01 MORBID OBESITY DUE TO EXCESS CALORIES (HCC): ICD-10-CM

## 2018-09-05 DIAGNOSIS — I83.813 VARICOSE VEINS OF BILATERAL LOWER EXTREMITIES WITH PAIN: ICD-10-CM

## 2018-09-05 DIAGNOSIS — Z98.890 HISTORY OF VARICOSE VEIN STRIPPING: Primary | ICD-10-CM

## 2018-09-05 PROCEDURE — 99215 OFFICE O/P EST HI 40 MIN: CPT | Performed by: SURGERY

## 2018-09-05 PROCEDURE — 93970 EXTREMITY STUDY: CPT | Performed by: SURGERY

## 2018-09-05 RX ORDER — LOSARTAN POTASSIUM AND HYDROCHLOROTHIAZIDE 25; 100 MG/1; MG/1
1 TABLET ORAL AS NEEDED
COMMUNITY

## 2018-09-05 NOTE — PROGRESS NOTES
Assessment/Plan:    Pt is a 77 yo F w/ HTN, hypothyroid, venous insufficiency s/p B GSV stripping ' by Dr Lawrnce Leyden, presents with new venous sxs and to discuss reflux study  History of varicose vein stripping  Varicose veins of bilateral lower extremities with pain  Morbid obesity due to excess calories ([address] )  -chronic venous insufficiency with new pain, aching, heaviness  -reviewed reflux study which shows significant deep reflux from the CFV to pop B; B GSV are surgically absent; B SSV with reflux only at midcalf level; B legs have large varicosities originating in pelvis and extending down legs  -discussed pathophysiology of venous disease and options for treatment; she has already had treatment of her superficial system disease and has residual deep disease which is causing symptoms; she also likely has a pelvic component as much of her discomfort is central and large varicosities originating from this area; at this point, varicosities are not bothersome enough to warrant stab phlebectomies; would recommend strict medical management for symptom control  -discussed medical management in detail including daily compression use with removal for sleep, leg elevation, increased activity, and weight loss  -f/u PRN      Subjective: "I am here to have my varicose veins checked "     Patient ID: Melodi Apgar is a 76 y o  female  Patient had a LEVDR on 18  She continues to have b/l leg pain  She states that it she has throbbing pain and bulging veins  She has swelling and heaviness  She has had this since   She wears her compression stockings daily and elevates her legs whenever she can  She had a hx of b/l vein stripping in   HPI:    Patient presents to discuss venous disease and review reflux study  Patient has longstanding venous hx and underwent stripping B in   She had relief after this procedure for many years but is now having recurrance of symptoms      She complains of pain in the legs, especially at the thighs and groin areas  She also has heaviness of the legs and pain at some of her retics  She does not have significant swelling, no skin changes, or skin breakdown  Denies hx of DVT  She also complains of sharp pain throughout the day, not affected by activity or time of day which does not sound venous in origin and is more likely musculoskeletal   She also has herniated disks  Since visit with [facility], she has gotten compression  She wears them for about 8hrs/day  She elevates a couple hours in the evening  She is doing stretches but not exercising per se  She is trying to eat healthier and control portions and has lost ~15lbs in 4 mos  She is considering returning to weight watchers  The following portions of the patient's history were reviewed and updated as appropriate: allergies, current medications, past family history, past medical history, past social history, past surgical history and problem list     Review of Systems   Constitutional: Negative for chills, fatigue and fever  HENT: Negative  Eyes: Negative  Respiratory: Negative for chest tightness and shortness of breath  Cardiovascular: Positive for leg swelling  Painful veins   Gastrointestinal: Negative  Endocrine: Negative  Genitourinary: Negative  Musculoskeletal: Positive for arthralgias and myalgias  Negative for back pain and joint swelling  Skin: Negative  Negative for color change and wound  Allergic/Immunologic: Negative  Neurological: Positive for light-headedness (after taking BP medication)  Negative for tremors, syncope, weakness, numbness and headaches  Hematological: Negative  Psychiatric/Behavioral: Negative            Objective:      /80 (BP Location: Left arm, Patient Position: Sitting)   Pulse 76   Temp 98 9 °F (37 2 °C) (Tympanic)   Resp 18   Ht 5' 5" (1 651 m)   Wt 105 kg (231 lb)   BMI 38 44 kg/m²          Physical Exam Constitutional: She is oriented to person, place, and time  She appears well-developed and well-nourished  HENT:   Head: Normocephalic and atraumatic  Eyes: Conjunctivae are normal    Neck: Normal range of motion  Neck supple  Cardiovascular: Normal rate, regular rhythm and normal heart sounds  No murmur heard  Pulses:       Radial pulses are 2+ on the right side, and 2+ on the left side  Dorsalis pedis pulses are 2+ on the right side, and 2+ on the left side  Posterior tibial pulses are 2+ on the right side, and 2+ on the left side  No carotid bruits B   Pulmonary/Chest: Effort normal and breath sounds normal  No respiratory distress  She has no wheezes  Abdominal: Soft  She exhibits no distension  There is no tenderness  There is no rebound  Musculoskeletal: Normal range of motion  She exhibits edema (mild BLE edema, mainly lower legs)  Neurological: She is alert and oriented to person, place, and time  Skin: Skin is warm and dry  Skin of good quality without discoloration or chronic venous changes; no wounds  Large varicosity running along medial R thigh and across anterior shin; L medial thigh varicosity; scattered retics and spiders   Psychiatric: She has a normal mood and affect  Her behavior is normal    Nursing note and vitals reviewed          Vitals:    09/05/18 1054   BP: 108/80   BP Location: Left arm   Patient Position: Sitting   Pulse: 76   Resp: 18   Temp: 98 9 °F (37 2 °C)   TempSrc: Tympanic   Weight: 105 kg (231 lb)   Height: 5' 5" (1 651 m)       Patient Active Problem List   Diagnosis    Varicose veins of bilateral lower extremities with pain    Morbid obesity due to excess calories (HCC)    History of varicose vein stripping       Past Surgical History:   Procedure Laterality Date    CHOLECYSTECTOMY      HERNIA REPAIR  2010    REPLACEMENT TOTAL KNEE  2013    VEIN SURGERY Bilateral     2002 Dr Pilar Bell       Family History   Problem Relation Age of Onset  Heart failure Mother     Lung cancer Father     Hypertension Sister     Hypertension Brother        Social History     Social History    Marital status: /Civil Union     Spouse name: N/A    Number of children: N/A    Years of education: N/A     Occupational History    Not on file       Social History Main Topics    Smoking status: Former Smoker    Smokeless tobacco: Never Used      Comment: pt quit in her 35s    Alcohol use No    Drug use: No    Sexual activity: Not on file     Other Topics Concern    Not on file     Social History Narrative    No narrative on file       Allergies   Allergen Reactions    Food Throat Swelling     FAT FREE SALAD DRESSING    Lactose Intolerance [Lactase] Abdominal Pain    Meloxicam Other (See Comments)     Other reaction(s): nose bleeds    Niacin Facial Swelling     Other reaction(s): Unknown Allergic Reaction    Norco [Hydrocodone-Acetaminophen] Other (See Comments)     Heat intolerance, flushing         Current Outpatient Prescriptions:     ALPRAZolam (XANAX) 0 5 mg tablet, Take 0 5 mg by mouth every 12 (twelve) hours, Disp: , Rfl: 5    cyclobenzaprine (FLEXERIL) 5 mg tablet, Take 1 tablet (5 mg total) by mouth 3 (three) times a day as needed for muscle spasms, Disp: 30 tablet, Rfl: 0    latanoprost (XALATAN) 0 005 % ophthalmic solution, 1 drop daily at bedtime, Disp: , Rfl:     LINZESS 145 MCG CAPS, , Disp: , Rfl:     losartan-hydrochlorothiazide (HYZAAR) 100-25 MG per tablet, Take 1 tablet by mouth daily, Disp: , Rfl:     SYNTHROID 112 MCG tablet, Take by mouth daily  , Disp: , Rfl:

## 2018-09-05 NOTE — PATIENT INSTRUCTIONS
1) Venous insufficiency  -we reviewed your ultrasound study which shows leaky valves in your deep veins  -there is no surgery recommended for your deep venous disease  -we will continue conservative management including daily compression stocking use (wear all day and remove at night for sleep), leg elevation as much as possible, increased exercise (walking or elliptical are great), continued healthy diet and weight loss  -continue to use moisturizer and keep your skin healthy as your have been doing    -if you would like to buy compression stockings online, please look for "gradient compression" in a weight of 20-30mmHg  [employer] and [employer] are both high-quality dependable brands  You can try thigh high or knee high, whichever are most comfortable for your and your symptoms

## 2018-10-03 ENCOUNTER — TELEPHONE (OUTPATIENT)
Dept: PHYSICAL THERAPY | Facility: OTHER | Age: 68
End: 2018-10-03

## 2018-10-03 NOTE — TELEPHONE ENCOUNTER
Patient was transferred from [facility]  Patient states she had an MRI on July 18th,2018 showing 4 herniated discs  Patient states she was referred by Dr Valentino Pacific to see Dr Geno Mendoza at [facility] and [facility]  Patient states she is not interested in [facility] and would just like to set up the appointment she was referred for

## 2018-10-05 ENCOUNTER — LAB (OUTPATIENT)
Dept: LAB | Facility: MEDICAL CENTER | Age: 68
End: 2018-10-05
Payer: MEDICARE

## 2018-10-05 ENCOUNTER — TRANSCRIBE ORDERS (OUTPATIENT)
Dept: ADMINISTRATIVE | Facility: HOSPITAL | Age: 68
End: 2018-10-05

## 2018-10-05 ENCOUNTER — HOSPITAL ENCOUNTER (OUTPATIENT)
Dept: ULTRASOUND IMAGING | Facility: HOSPITAL | Age: 68
Discharge: HOME | End: 2018-10-05
Attending: INTERNAL MEDICINE
Payer: MEDICARE

## 2018-10-05 DIAGNOSIS — M35.9 DIFFUSE DISEASE OF CONNECTIVE TISSUE (HCC): Primary | ICD-10-CM

## 2018-10-05 DIAGNOSIS — Z79.899 ENCOUNTER FOR LONG-TERM (CURRENT) USE OF MEDICATIONS: ICD-10-CM

## 2018-10-05 DIAGNOSIS — K40.20 BILATERAL INGUINAL HERNIA WITHOUT OBSTRUCTION OR GANGRENE, RECURRENCE NOT SPECIFIED: Primary | ICD-10-CM

## 2018-10-05 DIAGNOSIS — K40.20 BILATERAL INGUINAL HERNIA WITHOUT OBSTRUCTION OR GANGRENE, RECURRENCE NOT SPECIFIED: ICD-10-CM

## 2018-10-05 DIAGNOSIS — E55.9 VITAMIN D DEFICIENCY: ICD-10-CM

## 2018-10-05 LAB
25(OH)D3 SERPL-MCNC: 45.4 NG/ML (ref 30–100)
ALBUMIN SERPL BCP-MCNC: 3.6 G/DL (ref 3.5–5)
ALP SERPL-CCNC: 100 U/L (ref 46–116)
ALT SERPL W P-5'-P-CCNC: 38 U/L (ref 12–78)
ANION GAP SERPL CALCULATED.3IONS-SCNC: 3 MMOL/L (ref 4–13)
AST SERPL W P-5'-P-CCNC: 25 U/L (ref 5–45)
BASOPHILS # BLD AUTO: 0.05 THOUSANDS/ΜL (ref 0–0.1)
BASOPHILS NFR BLD AUTO: 1 % (ref 0–1)
BILIRUB SERPL-MCNC: 1.66 MG/DL (ref 0.2–1)
BUN SERPL-MCNC: 25 MG/DL (ref 5–25)
C3 SERPL-MCNC: 133 MG/DL (ref 90–180)
CALCIUM SERPL-MCNC: 9.1 MG/DL (ref 8.3–10.1)
CHLORIDE SERPL-SCNC: 100 MMOL/L (ref 100–108)
CO2 SERPL-SCNC: 31 MMOL/L (ref 21–32)
CREAT SERPL-MCNC: 0.68 MG/DL (ref 0.6–1.3)
EOSINOPHIL # BLD AUTO: 0.11 THOUSAND/ΜL (ref 0–0.61)
EOSINOPHIL NFR BLD AUTO: 2 % (ref 0–6)
ERYTHROCYTE [DISTWIDTH] IN BLOOD BY AUTOMATED COUNT: 13.2 % (ref 11.6–15.1)
ERYTHROCYTE [SEDIMENTATION RATE] IN BLOOD: 23 MM/HOUR (ref 0–20)
GFR SERPL CREATININE-BSD FRML MDRD: 90 ML/MIN/1.73SQ M
GLUCOSE P FAST SERPL-MCNC: 85 MG/DL (ref 65–99)
HCT VFR BLD AUTO: 43.1 % (ref 34.8–46.1)
HGB BLD-MCNC: 14.1 G/DL (ref 11.5–15.4)
IMM GRANULOCYTES # BLD AUTO: 0.01 THOUSAND/UL (ref 0–0.2)
IMM GRANULOCYTES NFR BLD AUTO: 0 % (ref 0–2)
LYMPHOCYTES # BLD AUTO: 2.56 THOUSANDS/ΜL (ref 0.6–4.47)
LYMPHOCYTES NFR BLD AUTO: 40 % (ref 14–44)
MCH RBC QN AUTO: 31.7 PG (ref 26.8–34.3)
MCHC RBC AUTO-ENTMCNC: 32.7 G/DL (ref 31.4–37.4)
MCV RBC AUTO: 97 FL (ref 82–98)
MONOCYTES # BLD AUTO: 0.66 THOUSAND/ΜL (ref 0.17–1.22)
MONOCYTES NFR BLD AUTO: 10 % (ref 4–12)
NEUTROPHILS # BLD AUTO: 3.09 THOUSANDS/ΜL (ref 1.85–7.62)
NEUTS SEG NFR BLD AUTO: 47 % (ref 43–75)
NRBC BLD AUTO-RTO: 0 /100 WBCS
PLATELET # BLD AUTO: 330 THOUSANDS/UL (ref 149–390)
PMV BLD AUTO: 10.1 FL (ref 8.9–12.7)
POTASSIUM SERPL-SCNC: 3.7 MMOL/L (ref 3.5–5.3)
PROT SERPL-MCNC: 7.8 G/DL (ref 6.4–8.2)
RBC # BLD AUTO: 4.45 MILLION/UL (ref 3.81–5.12)
SODIUM SERPL-SCNC: 134 MMOL/L (ref 136–145)
WBC # BLD AUTO: 6.48 THOUSAND/UL (ref 4.31–10.16)

## 2018-10-05 PROCEDURE — 86225 DNA ANTIBODY NATIVE: CPT | Performed by: INTERNAL MEDICINE

## 2018-10-05 PROCEDURE — 85025 COMPLETE CBC W/AUTO DIFF WBC: CPT | Performed by: INTERNAL MEDICINE

## 2018-10-05 PROCEDURE — 85652 RBC SED RATE AUTOMATED: CPT | Performed by: INTERNAL MEDICINE

## 2018-10-05 PROCEDURE — 86038 ANTINUCLEAR ANTIBODIES: CPT | Performed by: INTERNAL MEDICINE

## 2018-10-05 PROCEDURE — 80053 COMPREHEN METABOLIC PANEL: CPT | Performed by: INTERNAL MEDICINE

## 2018-10-05 PROCEDURE — 82306 VITAMIN D 25 HYDROXY: CPT | Performed by: INTERNAL MEDICINE

## 2018-10-05 PROCEDURE — 86160 COMPLEMENT ANTIGEN: CPT | Performed by: INTERNAL MEDICINE

## 2018-10-05 PROCEDURE — 36415 COLL VENOUS BLD VENIPUNCTURE: CPT | Performed by: INTERNAL MEDICINE

## 2018-10-05 PROCEDURE — 76705 ECHO EXAM OF ABDOMEN: CPT

## 2018-10-06 LAB — DSDNA AB SER-ACNC: 1 IU/ML (ref 0–9)

## 2018-10-08 LAB — RYE IGE QN: NEGATIVE

## 2018-10-12 ENCOUNTER — OFFICE VISIT (OUTPATIENT)
Dept: URGENT CARE | Facility: MEDICAL CENTER | Age: 68
End: 2018-10-12
Payer: MEDICARE

## 2018-10-12 VITALS
TEMPERATURE: 98.1 F | BODY MASS INDEX: 38.41 KG/M2 | SYSTOLIC BLOOD PRESSURE: 130 MMHG | HEIGHT: 64 IN | DIASTOLIC BLOOD PRESSURE: 90 MMHG | WEIGHT: 225 LBS | RESPIRATION RATE: 16 BRPM | HEART RATE: 96 BPM | OXYGEN SATURATION: 99 %

## 2018-10-12 DIAGNOSIS — L03.114 CELLULITIS OF LEFT UPPER EXTREMITY: Primary | ICD-10-CM

## 2018-10-12 PROCEDURE — G0463 HOSPITAL OUTPT CLINIC VISIT: HCPCS

## 2018-10-12 PROCEDURE — 99213 OFFICE O/P EST LOW 20 MIN: CPT

## 2018-10-12 RX ORDER — CEPHALEXIN 500 MG/1
500 CAPSULE ORAL EVERY 8 HOURS SCHEDULED
Qty: 21 CAPSULE | Refills: 0 | Status: SHIPPED | OUTPATIENT
Start: 2018-10-12 | End: 2018-10-19

## 2018-10-12 NOTE — PROGRESS NOTES
[facility]        NAME: Iftikhar Hernandez is a 76 y o  female  : 1950    MRN: [id number]      Assessment and Plan   Cellulitis of left upper extremity [L03 114]  1  Cellulitis of left upper extremity  cephalexin (KEFLEX) 500 mg capsule         Patient Instructions       Follow up with PCP in 3-5 days  Proceed to  ER if symptoms worsen  Chief Complaint     Chief Complaint   Patient presents with   [address]     Insect bite left arm, x1day         History of Present Illness       Insect Bite   This is a new problem  Episode onset: 3-4 days  The problem occurs constantly  The problem has been unchanged  Pertinent negatives include no abdominal pain, anorexia, arthralgias, change in bowel habit, chest pain, chills, congestion, coughing, diaphoresis, fatigue, fever, headaches, joint swelling, myalgias, nausea, neck pain, numbness, rash, sore throat, swollen glands, urinary symptoms, vertigo, visual change, vomiting or weakness  Associated symptoms comments: Bug bite left forearm    The treatment provided mild relief  Review of Systems   Review of Systems   Constitutional: Negative for chills, diaphoresis, fatigue and fever  HENT: Negative for congestion and sore throat  Respiratory: Negative for cough  Cardiovascular: Negative for chest pain  Gastrointestinal: Negative for abdominal pain, anorexia, change in bowel habit, nausea and vomiting  Musculoskeletal: Negative for arthralgias, joint swelling, myalgias and neck pain  Skin: Positive for wound  Negative for rash  Neurological: Negative for vertigo, weakness, numbness and headaches           Current Medications       Current Outpatient Prescriptions:     ALPRAZolam (XANAX) 0 5 mg tablet, Take 0 5 mg by mouth every 12 (twelve) hours, Disp: , Rfl: 5    cephalexin (KEFLEX) 500 mg capsule, Take 1 capsule (500 mg total) by mouth every 8 (eight) hours for 7 days, Disp: 21 capsule, Rfl: 0    cyclobenzaprine (FLEXERIL) 5 mg tablet, Take 1 tablet (5 mg total) by mouth 3 (three) times a day as needed for muscle spasms, Disp: 30 tablet, Rfl: 0    latanoprost (XALATAN) 0 005 % ophthalmic solution, 1 drop daily at bedtime, Disp: , Rfl:     LINZESS 145 MCG CAPS, , Disp: , Rfl:     losartan-hydrochlorothiazide (HYZAAR) 100-25 MG per tablet, Take 1 tablet by mouth daily, Disp: , Rfl:     SYNTHROID 112 MCG tablet, Take by mouth daily  , Disp: , Rfl:     Current Allergies     Allergies as of 10/12/2018 - Reviewed 10/12/2018   Allergen Reaction Noted    Food Throat Swelling 05/29/2018    Lactose intolerance [lactase] Abdominal Pain 05/29/2018    Meloxicam Other (See Comments) 02/14/2017    Niacin Facial Swelling 02/14/2017    Norco [hydrocodone-acetaminophen] Other (See Comments) 05/29/2018            The following portions of the patient's history were reviewed and updated as appropriate: allergies, current medications, past family history, past medical history, past social history, past surgical history and problem list      Past Medical History:   Diagnosis Date    Anxiety     Fibromyalgia     Hypothyroidism     IBS (irritable bowel syndrome)        Past Surgical History:   Procedure Laterality Date    CHOLECYSTECTOMY      HERNIA REPAIR  2010    REPLACEMENT TOTAL KNEE  2013    VEIN SURGERY Bilateral     2002 Dr Jenniffer Karimi       Family History   Problem Relation Age of Onset    Heart failure Mother     Lung cancer Father     Hypertension Sister     Hypertension Brother          Medications have been verified  Objective   /90 (BP Location: Right arm, Patient Position: Sitting)   Pulse 96   Temp 98 1 °F (36 7 °C) (Temporal)   Resp 16   Ht 5' 4" (1 626 m)   Wt 102 kg (225 lb)   SpO2 99%   BMI 38 62 kg/m²        Physical Exam     Physical Exam   Constitutional: She is oriented to person, place, and time  She appears well-developed and well-nourished  No distress     Cardiovascular: Normal rate, regular rhythm and normal heart sounds  Pulmonary/Chest: Effort normal and breath sounds normal  No respiratory distress  Neurological: She is alert and oriented to person, place, and time  Skin: No rash noted  Nursing note and vitals reviewed

## 2018-10-12 NOTE — PATIENT INSTRUCTIONS
Cellulitis   AMBULATORY CARE:   Cellulitis  is a skin infection caused by bacteria  Cellulitis usually appears on the legs and feet, arms and hands, or face  Common signs and symptoms include the following:   · A red, warm, swollen area on your skin    · Pain when the area is touched    · Bumps or blisters (abscess) that may drain pus    · Bumpy, raised skin that feels like an orange peel  Call 911 if:   · You have sudden trouble breathing or chest pain  Seek care immediately if:   · Your wound gets larger and more painful  · You feel a crackling under your skin when you touch it  · You have purple dots or bumps on your skin, or you see bleeding under your skin  · You have new swelling and pain in your legs  · The red, warm, swollen area gets larger  · You see red streaks coming from the infected area  Contact your healthcare provider if:   · You have a fever  · Your fever or pain does not go away or gets worse  · The area does not get smaller after 2 days of antibiotics  · Your skin is flaking or peeling off  · You have questions or concerns about your condition or care  Treatment for cellulitis  may decrease symptoms, stop the infection from spreading, and cure the infection  Treatment depends on how severe your cellulitis is  Cellulitis may go away on its own  You may  instead need antibiotics to help treat the bacterial infection and medicines for pain  Your healthcare provider may draw a [place] around the edges of your cellulitis  If your cellulitis spreads, your healthcare provider will see it outside of the [place]  Manage your symptoms:   · Elevate the area above the level of your heart  as often as you can  This will help decrease swelling and pain  Prop the area on pillows or blankets to keep it elevated comfortably  · Clean the area daily until the wound scabs over  Gently wash the area with soap and water  Pat dry  Use dressings as directed       · Place cool or warm, wet cloths on the area as directed  Use clean cloths and clean water  Leave it on the area until the cloth is room temperature  Pat the area dry with a clean, dry cloth  The cloths may help decrease pain  Prevent cellulitis:   · Do not scratch bug bites or areas of injury  You increase your risk for cellulitis by scratching these areas  · Do not share personal items, such as towels, clothing, and razors  · Clean exercise equipment  with germ-killing  before and after you use it  · Wash your hands often  Use soap and water  Wash your hands after you use the bathroom, change a child's diapers, or sneeze  Wash your hands before you prepare or eat food  Use lotion to prevent dry, cracked skin  · Wear pressure stockings as directed  You may be told to wear the stockings if you have peripheral edema  The stockings improve blood flow and decrease swelling  · Treat athlete's foot  This can help prevent the spread of a bacterial skin infection  Follow up with your healthcare provider within 3 days, or as directed: Your healthcare provider will check if your cellulitis is getting better  You may need different medicine  Write down your questions so you remember to ask them during your visits  © 2017 [employer]  Information is for End User's use only and may not be sold, redistributed or otherwise used for commercial purposes  All illustrations and images included in CareNotes® are the copyrighted property of A [employer] M [employer] Inc  or [employer]  The above information is an  only  It is not intended as medical advice for individual conditions or treatments  Talk to your doctor, nurse or pharmacist before following any medical regimen to see if it is safe and effective for you

## 2018-10-22 ENCOUNTER — CONSULT (OUTPATIENT)
Dept: PAIN MEDICINE | Facility: CLINIC | Age: 68
End: 2018-10-22
Payer: MEDICARE

## 2018-10-22 ENCOUNTER — APPOINTMENT (OUTPATIENT)
Dept: RADIOLOGY | Facility: CLINIC | Age: 68
End: 2018-10-22
Payer: MEDICARE

## 2018-10-22 VITALS
WEIGHT: 225 LBS | HEART RATE: 82 BPM | SYSTOLIC BLOOD PRESSURE: 138 MMHG | TEMPERATURE: 98.3 F | HEIGHT: 64 IN | DIASTOLIC BLOOD PRESSURE: 72 MMHG | BODY MASS INDEX: 38.41 KG/M2

## 2018-10-22 DIAGNOSIS — M25.551 RIGHT HIP PAIN: Primary | ICD-10-CM

## 2018-10-22 DIAGNOSIS — M25.551 RIGHT HIP PAIN: ICD-10-CM

## 2018-10-22 DIAGNOSIS — M48.061 SPINAL STENOSIS OF LUMBAR REGION, UNSPECIFIED WHETHER NEUROGENIC CLAUDICATION PRESENT: ICD-10-CM

## 2018-10-22 DIAGNOSIS — M51.16 INTERVERTEBRAL DISC DISORDERS WITH RADICULOPATHY, LUMBAR REGION: ICD-10-CM

## 2018-10-22 PROCEDURE — 99204 OFFICE O/P NEW MOD 45 MIN: CPT | Performed by: ANESTHESIOLOGY

## 2018-10-22 PROCEDURE — 73502 X-RAY EXAM HIP UNI 2-3 VIEWS: CPT

## 2018-10-22 RX ORDER — TRAMADOL HYDROCHLORIDE 50 MG/1
50 TABLET ORAL EVERY 6 HOURS PRN
COMMUNITY
End: 2018-12-03

## 2018-10-22 NOTE — PROGRESS NOTES
Assessment:  1  Right hip pain    2  Spinal stenosis of lumbar region, unspecified whether neurogenic claudication present    3  Intervertebral disc disorders with radiculopathy, lumbar region        Plan:  Iftikhar Hernandez is a 76 y o  female presents for initial consultation for low back pain and bilateral leg pain  The patient presents today with pain that is multifactorial nature with symptoms possibly stemming from her right hip as well as her  low back  Results of her most recent lumbar MRI was reviewed with her during her office visit today, which showed spinal stenosis which is likely contributing to her symptoms  She was also noted to have increased right groin pain on examination and increased right hip pain with internal and external rotation  Therefore the patient was ordered an x-ray of her right hip for further evaluation  The patient was instructed that our office will call with results of this study once is completed  I did discuss with the patient that if her right hip x-ray was within normal limits, we would possibly move forward with a bilateral L4 transforaminal epidural steroid injection  The patient verbalized understanding  The patient will follow up after her right hip x-ray or sooner with the worsening of symptoms  My impressions and treatment recommendations were discussed in detail with the patient who verbalized understanding and had no further questions  Discharge instructions were provided  I personally saw and examined the patient and I agree with the above discussed plan of care  Orders Placed This Encounter   Procedures    XR hip/pelv 2-3 vws right if performed     Standing Status:   Future     Number of Occurrences:   1     Standing Expiration Date:   10/22/2022     Scheduling Instructions:      Bring along any outside films relating to this procedure             Order Specific Question:   Reason for Exam:     Answer:   Right hip pain     New Medications Ordered This Visit   Medications    traMADol (ULTRAM) 50 mg tablet     Sig: Take 50 mg by mouth every 6 (six) hours as needed for moderate pain       History of Present Illness:    Clarisse Patel is a 76 y o  female presents for initial consultation for low back pain and bilateral leg pain  The patient reports that her pain is symptoms have her present for the last 4-5 months  She denies any predisposing injury or trauma  She reports that her pain is moderate to severe nature occurring constantly with no typical pattern  She currently rates her pain 9 out 10 numeric pain scale  She describes her pain as burning, cramping, shooting, numbness, sharp, dull/aching, pins and needles pain  She reports weakness in her bilateral lower extremities  She reports that the pain starts in her back and radiates down the posterior aspect of her left leg to her ankle  She also reports that symptoms radiate into her right groin and down the anterior aspect of her right thigh to her knee  She reports that her pain is decreased with lying down and relaxation  She reports no change in pain with bending, sitting, coughing/sneezing, bowel movements  She reports that her pain is increased with standing, walking, exercising  The patient is currently taking tramadol 50 mg 1 tablet 2 times daily and cyclobenzaprine 10 mg 3 times daily  Which provides her minimal pain relief  I have personally reviewed and/or updated the patient's past medical history, past surgical history, family history, social history, current medications, allergies, and vital signs today  Review of Systems:    Review of Systems   Constitutional: Positive for unexpected weight change  Negative for fever  HENT: Negative for trouble swallowing  Eyes: Negative for visual disturbance  Respiratory: Negative for shortness of breath and wheezing  Cardiovascular: Negative for chest pain and palpitations     Gastrointestinal: Negative for constipation, diarrhea, nausea and vomiting  Endocrine: Negative for cold intolerance, heat intolerance and polydipsia  Genitourinary: Negative for difficulty urinating and frequency  Musculoskeletal: Positive for gait problem and joint swelling  Negative for arthralgias and myalgias  Skin: Negative for rash  Neurological: Negative for dizziness, seizures, syncope, weakness and headaches  Hematological: Does not bruise/bleed easily  Psychiatric/Behavioral: Negative for dysphoric mood  All other systems reviewed and are negative  Patient Active Problem List   Diagnosis    Varicose veins of bilateral lower extremities with pain    Morbid obesity due to excess calories (HCC)    History of varicose vein stripping       Past Medical History:   Diagnosis Date    Anxiety     Fibromyalgia     Hypothyroidism     IBS (irritable bowel syndrome)        Past Surgical History:   Procedure Laterality Date    CHOLECYSTECTOMY      HERNIA REPAIR  2010    REPLACEMENT TOTAL KNEE  2013    VEIN SURGERY Bilateral     2002 Dr Montserrat Mercedes       Family History   Problem Relation Age of Onset    Heart failure Mother     Lung cancer Father     Hypertension Sister     Hypertension Brother        Social History     Occupational History    Not on file       Social History Main Topics    Smoking status: Former Smoker    Smokeless tobacco: Never Used      Comment: pt quit in her 35s    Alcohol use No    Drug use: No    Sexual activity: Not on file       Current Outpatient Prescriptions on File Prior to Visit   Medication Sig    ALPRAZolam (XANAX) 0 5 mg tablet Take 0 5 mg by mouth every 12 (twelve) hours    cyclobenzaprine (FLEXERIL) 5 mg tablet Take 1 tablet (5 mg total) by mouth 3 (three) times a day as needed for muscle spasms    latanoprost (XALATAN) 0 005 % ophthalmic solution 1 drop daily at bedtime    LINZESS 145 MCG CAPS     losartan-hydrochlorothiazide (HYZAAR) 100-25 MG per tablet Take 1 tablet by mouth daily    SYNTHROID 112 MCG tablet Take by mouth daily       No current facility-administered medications on file prior to visit  Allergies   Allergen Reactions    Food Throat Swelling     FAT FREE SALAD DRESSING    Lactose Intolerance [Lactase] Abdominal Pain    Meloxicam Other (See Comments)     Other reaction(s): nose bleeds    Niacin Facial Swelling     Other reaction(s): Unknown Allergic Reaction    Norco [Hydrocodone-Acetaminophen] Other (See Comments)     Heat intolerance, flushing       Physical Exam:    /72   Pulse 82   Temp 98 3 °F (36 8 °C) (Oral)   Ht 5' 4" (1 626 m)   Wt 102 kg (225 lb)   BMI 38 62 kg/m²     Constitutional: normal, well developed, well nourished, alert, in no distress and non-toxic and no overt pain behavior   and obese  Eyes: anicteric  HEENT: grossly intact  Neck: supple, symmetric, trachea midline and no masses   Pulmonary:even and unlabored  Cardiovascular:No edema or pitting edema present  Skin:Normal without rashes or lesions and well hydrated  Psychiatric:Mood and affect appropriate  Neurologic:Cranial Nerves II-XII grossly intact  Musculoskeletal:normal     Lumbar Spine Exam    Appearance:  Normal lordosis  Palpation/Tenderness:  left lumbar paraspinal tenderness  right lumbar paraspinal tenderness  Right hip tenderness   Sensory:  no sensory deficits noted  Range of Motion:  Flexion:  Minimally limited  with pain  Extension:  Minimally limited  with pain  Lateral Flexion - Left:  Minimally limited  with pain  Lateral Flexion - Right:  Minimally limited  with pain  Rotation - Left:  Minimally limited  with pain  Rotation - Right:  Minimally limited  with pain  Motor Strength:  Left hip flexion:  5/5  Right hip flexion:  5/5  Left knee extension:  5/5  Right knee extension:  5/5  Left foot dorsiflexion:  5/5  Left foot plantar flexion:  5/5  Right foot dorsiflexion:  5/5  Right foot plantar flexion:  5/5  Special Tests:  Left Straight Leg Test:  negative  Right Straight Leg Test:  negative  Left Nain's Maneuver:  negative  Right Nain's Maneuver:  negative   Pain with internal and external rotation of right hip joint  Imaging    [facility] REPORT  CLINICAL:  Indications: She reports a history of lower extremity vein stripping by Dr Kiarra Benton in approximately 2001  Patient complains of painful varicose veins in he  bilateral thighs       FINDINGS:     Right           Impression  AP(mm)  Valve   Reflux Time    GSV Inguinal    Stripped                                   GSV Prox Thigh  Stripped                                   GSV Mid Thigh   Stripped                                   GSV Dist Thigh  Stripped                                   CFV                                 Reflux      1 65280    GSV Knee        Stripped                                   FV Prox                             Reflux      2 02125    GSV Mid Calf    Stripped                                   Popliteal                           Reflux      3 85805    SSV Mid Calf                   3 2  Reflux      1 05891    SSV Knee                       2 7                         SSV Ankle                      2 7                            Left            Impression  AP(mm)  Valve   Reflux Time    GSV Inguinal    Stripped                                   GSV Prox Thigh  Stripped                                   GSV Mid Thigh   Stripped                                   GSV Dist Thigh  Stripped                                   CFV                                 Reflux      4 54427    GSV Knee        Stripped                                   FV Prox                             Reflux      3 77043    GSV Mid Calf    Stripped                                   GSV Ankle       Stripped                                   Popliteal                           Reflux      1 77204    SSV Mid Calf                   3 3  Reflux      1 41869    SSV Knee                       4 7 SSV Ankle                      3 3                                  CONCLUSION:  Impression:  RIGHT LIMB:  Deep venous incompetence is noted in the common femoral vein, proximal femoral  vein and popliteal vein  The great saphenous vein is surgical absent  Large varicose vein in thigh and calf is a tributary from the pelvic region and  a proximal femoral vein   The small saphenous vein is incompetent and communicates with the popliteal  vein  There is no evidence of incompetent perforators in the thigh or calf  There is no evidence of deep vein thrombosis in the CFV, the proximal PFV, the  femoral vein and the popliteal vein  LEFT LIMB:  Deep venous incompetence is noted in the common femoral vein, proximal femoral  vein and popliteal vein  The great saphenous vein is surgical absent  Large varicose vein in thigh and calf is a tributary from the pelvic region  The small saphenous vein is incompetent and communicates with the popliteal  vein  There is no evidence of incompetent perforators in the thigh or calf  There is no evidence of deep vein thrombosis in the CFV, the proximal PFV, the  femoral vein and the popliteal vein  Incompetent distal calf   Study performed with patient in standing  / steep Reverse Trendelenburg

## 2018-10-29 ENCOUNTER — TELEPHONE (OUTPATIENT)
Dept: PAIN MEDICINE | Facility: MEDICAL CENTER | Age: 68
End: 2018-10-29

## 2018-10-29 DIAGNOSIS — M16.11 PRIMARY OSTEOARTHRITIS OF RIGHT HIP: Primary | ICD-10-CM

## 2018-10-29 NOTE — TELEPHONE ENCOUNTER
Pt called stating that she is waiting for a call back to discuss the results of her xray  Pt can be reached at [phone] or on her cell at [phone]

## 2018-10-29 NOTE — TELEPHONE ENCOUNTER
I called the patient back with the results of her right hip x-ray and she would like to proceed with a right hip injection  Can you please call the patient to schedule her for this procedure  I have placed the order in EPIC

## 2018-11-13 ENCOUNTER — HOSPITAL ENCOUNTER (OUTPATIENT)
Dept: RADIOLOGY | Facility: CLINIC | Age: 68
Discharge: HOME | End: 2018-11-13
Attending: ANESTHESIOLOGY | Admitting: ANESTHESIOLOGY
Payer: MEDICARE

## 2018-11-13 VITALS
SYSTOLIC BLOOD PRESSURE: 134 MMHG | HEART RATE: 79 BPM | OXYGEN SATURATION: 100 % | DIASTOLIC BLOOD PRESSURE: 86 MMHG | RESPIRATION RATE: 20 BRPM | TEMPERATURE: 98.1 F

## 2018-11-13 DIAGNOSIS — M16.11 PRIMARY OSTEOARTHRITIS OF RIGHT HIP: ICD-10-CM

## 2018-11-13 PROCEDURE — 20610 DRAIN/INJ JOINT/BURSA W/O US: CPT | Performed by: ANESTHESIOLOGY

## 2018-11-13 PROCEDURE — 77002 NEEDLE LOCALIZATION BY XRAY: CPT | Performed by: ANESTHESIOLOGY

## 2018-11-13 PROCEDURE — 77002 NEEDLE LOCALIZATION BY XRAY: CPT

## 2018-11-13 RX ORDER — BUPIVACAINE HCL/PF 2.5 MG/ML
30 VIAL (ML) INJECTION ONCE
Status: COMPLETED | OUTPATIENT
Start: 2018-11-13 | End: 2018-11-13

## 2018-11-13 RX ORDER — METHYLPREDNISOLONE ACETATE 80 MG/ML
80 INJECTION, SUSPENSION INTRA-ARTICULAR; INTRALESIONAL; INTRAMUSCULAR; PARENTERAL; SOFT TISSUE ONCE
Status: COMPLETED | OUTPATIENT
Start: 2018-11-13 | End: 2018-11-13

## 2018-11-13 RX ORDER — 0.9 % SODIUM CHLORIDE 0.9 %
10 VIAL (ML) INJECTION ONCE
Status: COMPLETED | OUTPATIENT
Start: 2018-11-13 | End: 2018-11-13

## 2018-11-13 RX ADMIN — IOHEXOL 1 ML: 300 INJECTION, SOLUTION INTRAVENOUS at 15:29

## 2018-11-13 RX ADMIN — SODIUM CHLORIDE 2.5 ML: 9 INJECTION, SOLUTION INTRAMUSCULAR; INTRAVENOUS; SUBCUTANEOUS at 15:28

## 2018-11-13 RX ADMIN — Medication 2.5 ML: at 15:28

## 2018-11-13 RX ADMIN — Medication 4 ML: at 15:29

## 2018-11-13 RX ADMIN — METHYLPREDNISOLONE ACETATE 80 MG: 80 INJECTION, SUSPENSION INTRA-ARTICULAR; INTRALESIONAL; INTRAMUSCULAR; PARENTERAL; SOFT TISSUE at 15:29

## 2018-11-13 NOTE — DISCHARGE INSTR - LAB
Steroid Joint Injection   WHAT YOU NEED TO KNOW:   A steroid joint injection is a procedure to inject steroid medicine into a joint  Steroid medicine decreases pain and inflammation  The injection may also contain an anesthetic (numbing medicine) to decrease pain  It may be done to treat conditions such as arthritis, gout, or carpal tunnel syndrome  The injections may be given in your knee, ankle, shoulder, elbow, wrist, ankle or sacroiliac joint  1  Do not apply heat to any area that is numb  If you have discomfort or soreness at the injection site, you may apply ice today, 20 minutes on and 20 minutes off  Tomorrow you may use ice or warm, moist heat  Do not apply ice or heat directly to the skin  2  You may have an increase or change in the discomfort for 36-48 hours after your treatment  Apply ice and continue with any pain medicine you have been prescribed  3  Do not do anything strenuous today  You may shower, but no tub baths or hot tubs today  You may resume your normal activities tomorrow, but do not overdo it  Resume normal activities slowly when you are feeling better  4  If you experience redness, drainage or swelling at the injection site, or if you develop a fever above 100 degrees, please call [facility] at [phone] or go to the Emergency Room  5  Continue to take all routine medicines prescribed by your primary care physician unless otherwise instructed by our staff  Most blood thinners should be started again according to your regularly scheduled dosing  If you have any questions, please give our office a call  If you have a problem specifically related to your procedure, please call our office at [phone]  Problems not related to your procedure should be directed to your primary care physician

## 2018-11-13 NOTE — H&P
History of Present Illness: The patient is a 76 y o  female who presents with complaints of right hip pain secondary to hip osteoarthritis and is here today for right hip intra-articular steroid injection      Patient Active Problem List   Diagnosis    Varicose veins of bilateral lower extremities with pain    Morbid obesity due to excess calories (HCC)    History of varicose vein stripping       Past Medical History:   Diagnosis Date    Anxiety     Fibromyalgia     Hypothyroidism     IBS (irritable bowel syndrome)        Past Surgical History:   Procedure Laterality Date    CHOLECYSTECTOMY      HERNIA REPAIR  2010    REPLACEMENT TOTAL KNEE  2013    VEIN SURGERY Bilateral     2002 Dr Claudia Johnson         Current Outpatient Prescriptions:     ALPRAZolam (XANAX) 0 5 mg tablet, Take 0 5 mg by mouth daily at bedtime as needed  , Disp: , Rfl: 5    LINZESS 145 MCG CAPS, daily in the early morning  , Disp: , Rfl:     cyclobenzaprine (FLEXERIL) 5 mg tablet, Take 1 tablet (5 mg total) by mouth 3 (three) times a day as needed for muscle spasms, Disp: 30 tablet, Rfl: 0    latanoprost (XALATAN) 0 005 % ophthalmic solution, 1 drop daily at bedtime, Disp: , Rfl:     losartan-hydrochlorothiazide (HYZAAR) 100-25 MG per tablet, Take 1 tablet by mouth daily, Disp: , Rfl:     SYNTHROID 112 MCG tablet, Take by mouth daily  , Disp: , Rfl:     traMADol (ULTRAM) 50 mg tablet, Take 50 mg by mouth every 6 (six) hours as needed for moderate pain, Disp: , Rfl:     Allergies   Allergen Reactions    Food Throat Swelling     FAT FREE SALAD DRESSING    Lactose Intolerance [Lactase] Abdominal Pain    Meloxicam Other (See Comments)     Other reaction(s): nose bleeds    Niacin Facial Swelling     Other reaction(s): Unknown Allergic Reaction    Norco [Hydrocodone-Acetaminophen] Other (See Comments)     Heat intolerance, flushing       Physical Exam:   Vitals:    11/13/18 1505   Pulse: 70   Resp: 18   Temp: 98 1 °F (36 7 °C)   SpO2: 100%     General: Awake, Alert, Oriented x 3, Mood and affect appropriate  Respiratory: Respirations even and unlabored  Cardiovascular: Peripheral pulses intact; no edema  Musculoskeletal Exam:   Right hip pain    ASA Score: 2    Patient/Chart Verification  Patient ID Verified: Verbal  Consents Confirmed: Procedural, To be obtained in the Pre-Procedure area  H&P( within 30 days) Verified: To be obtained in the Pre-Procedure area  Allergies Reviewed: Yes  Anticoag/NSAID held?: NA  Currently on antibiotics?: No    Assessment:   1   Primary osteoarthritis of right hip        Plan: Right intra-articular hip injection

## 2018-11-21 ENCOUNTER — TELEPHONE (OUTPATIENT)
Dept: OBGYN CLINIC | Facility: HOSPITAL | Age: 68
End: 2018-11-21

## 2018-11-21 NOTE — TELEPHONE ENCOUNTER
Pt reports no improvement since inj   Pain level 9/10  Increased pain since injection   Pt currently taking advil bid for pain   Right upper leg and back pain    She wants to know what else she can take for pain she does have Tramadol but she said it doesn't work and she doesn't know how much of the advil she should be taking

## 2018-11-23 NOTE — TELEPHONE ENCOUNTER
Patient also has spinal stenosis in back so would recommend proceeding with Right L3-4 TF CHRISTAL (Dx M49 149)    In regards to pain medications, she can take advil 600mg TID prn or can prescribe diclofenac 75 mg BID to see if that helps

## 2018-11-23 NOTE — TELEPHONE ENCOUNTER
S/W pt and advised of FQ's recommendations as stated in his previous task  Pt will increase her advil to 600 mg TID PRN  Pt would like to do the inj but said besides the right sided pain she also has left leg numbness and tingling and would like the inj to take care of symptoms on both sides   Told pt I will forward a message to FQ when he returns Mon and then someone will call her next week to scheduled the inj

## 2018-11-28 ENCOUNTER — TELEPHONE (OUTPATIENT)
Dept: RADIOLOGY | Facility: CLINIC | Age: 68
End: 2018-11-28

## 2018-11-28 NOTE — TELEPHONE ENCOUNTER
Called patient to schedule her procedure and she then stated that she cannot take the dosage of the advil that you advised her to take  The reason is that she cannot take a high enough dosage to help the pain because she has IBS and it gives her great stomach pain  She wanted to know if there is anything else she can take? Please advise  If there is anything called in for her she wanted me to make sure to mention that it would go the the [facility] in [address] [place] [address]   Thank you

## 2018-12-03 ENCOUNTER — OFFICE VISIT (OUTPATIENT)
Dept: PAIN MEDICINE | Facility: CLINIC | Age: 68
End: 2018-12-03
Payer: MEDICARE

## 2018-12-03 VITALS
WEIGHT: 216 LBS | SYSTOLIC BLOOD PRESSURE: 121 MMHG | TEMPERATURE: 98 F | HEART RATE: 91 BPM | BODY MASS INDEX: 36.88 KG/M2 | DIASTOLIC BLOOD PRESSURE: 82 MMHG | HEIGHT: 64 IN

## 2018-12-03 DIAGNOSIS — M16.11 PRIMARY OSTEOARTHRITIS OF RIGHT HIP: ICD-10-CM

## 2018-12-03 DIAGNOSIS — G89.4 CHRONIC PAIN SYNDROME: Primary | ICD-10-CM

## 2018-12-03 DIAGNOSIS — M51.16 INTERVERTEBRAL DISC DISORDERS WITH RADICULOPATHY, LUMBAR REGION: ICD-10-CM

## 2018-12-03 DIAGNOSIS — M48.061 SPINAL STENOSIS OF LUMBAR REGION, UNSPECIFIED WHETHER NEUROGENIC CLAUDICATION PRESENT: ICD-10-CM

## 2018-12-03 PROCEDURE — 99213 OFFICE O/P EST LOW 20 MIN: CPT | Performed by: NURSE PRACTITIONER

## 2018-12-03 NOTE — PROGRESS NOTES
Assessment:  1  Chronic pain syndrome    2  Intervertebral disc disorders with radiculopathy, lumbar region    3  Spinal stenosis of lumbar region, unspecified whether neurogenic claudication present    4  Primary osteoarthritis of right hip        Plan:  Mayra Slade is a 76 y o  female with a history of lumbar spinal stenosis, lumbar intervertebral disc disorder radiculopathy, and right hip pain  The patient continues today with ongoing low back pain  She is scheduled to undergo a bilateral L3 transforaminal epidural steroid injection on 12/11/2018  In the meantime the patient has only been taking Advil as needed for her pain  I discuss with her medication options to help manage her pain more effectively prior to her injection on 12/11/2018  She reports that she has oxycodone at home however it is too strong for her and she is not willing to take this medication  She also has a documented history of an allergy to hydrocodone, which causes her flushing and heat intolerance  She does have remaining tramadol at home, which she has not been taking due to she feels it is not useful  However, she reports that she will try to take her remaining tramadol 2 times a day to help with her pain until her injection, due to her many sensitivities to other medications  She reported that she does not need a refill of this medication at this time and still has one remaining refill on the bottle  I also discussed with the patient about starting either gabapentin or Lyrica  She reported that she also had reactions to these medications as well  I did instruct her that she can take tylenol as well to augment her medication regiment the  However she is not to exceed over 3000 mg within a 24 hr period  She verbalized a understanding  I discussed with her about performing a baseline urine drug screen so office will be able to provide her additional opioid medications in the future   However, the patient reported that she is not interested in continuing with opioid medications after the completion of her injection on 12/11/2018  She stated that she would not like to proceed with a urine drug screen at this time  The patient reported that she had a reaction after her right hip injection  She stated that her face was flushed as well as her upper chest to her nipples  She stated that her nipples were bright red as well as an incision on her abdomen was also reddish in color  I told the patient that I would inform Dr Dottie Maurer of this reaction prior to her next injection  The patient will follow up on a as needed basis or sooner with the worsening of symptoms  My impressions and treatment recommendations were discussed in detail with the patient who verbalized understanding and had no further questions  Discharge instructions were provided  I personally saw and examined the patient and I agree with the above discussed plan of care  No orders of the defined types were placed in this encounter  New Medications Ordered This Visit   Medications    esomeprazole (NexIUM) 20 mg capsule     Sig: Take 20 mg by mouth every morning before breakfast       History of Present Illness:  Johana Hunt is a 76 y o  female with a history of lumbar spinal stenosis, lumbar intervertebral disc disorder radiculopathy, and right hip pain  The patient was last seen office on 11/13/2018, where she underwent a right intra-articular hip injection  She presents for a follow up office visit in regards to Back Pain  Since last office visit the patient was scheduled for hernia surgery on 12/6/2018  However she has postpone her hernia surgery to first take care of her low back pain  She reported that she will reschedule her hernia surgery if she has relief of symptoms after undergoing a bilateral L3 transforaminal epidural steroid injection on 12/11/2018    The patients current symptoms include back pain that radiates down the anterior aspect of her right thigh and down the posterior aspect of her left calf  She denies bilateral leg weakness or bowel or bladder issues  She describes her pain as dull aching, numbness, pins and needles pain that is constant nature occurring throughout the day  The patient reports that her pain is symptoms are unchanged since last office visit  She currently rates her pain as 7 out 10 numeric pain scale  Current pain medications includes:  Advil 3 times a day  The patient reports that this regimen is providing minimal pain relief  The patient is reporting no side effects from this pain medication regimen  I have personally reviewed and/or updated the patient's past medical history, past surgical history, family history, social history, current medications, allergies, and vital signs today  Review of Systems   Respiratory: Negative for shortness of breath  Cardiovascular: Negative for chest pain  Gastrointestinal: Negative for constipation, diarrhea, nausea and vomiting  Musculoskeletal: Positive for gait problem  Negative for arthralgias, joint swelling and myalgias  Pain in extremity  Joint stiffness   Skin: Negative for rash  Allergic/Immunologic:        Pain in extremity   Neurological: Negative for dizziness, seizures and weakness  All other systems reviewed and are negative        Patient Active Problem List   Diagnosis    Varicose veins of bilateral lower extremities with pain    Morbid obesity due to excess calories (HCC)    History of varicose vein stripping    Primary osteoarthritis of right hip       Past Medical History:   Diagnosis Date    Anxiety     Fibromyalgia     Hypothyroidism     IBS (irritable bowel syndrome)        Past Surgical History:   Procedure Laterality Date    CHOLECYSTECTOMY      HERNIA REPAIR  2010    REPLACEMENT TOTAL KNEE  2013    VEIN SURGERY Bilateral     2002 Dr Jeanette Martinez       Family History   Problem Relation Age of Onset    Heart failure Mother  Lung cancer Father     Hypertension Sister     Hypertension Brother        Social History     Occupational History    Not on file  Social History Main Topics    Smoking status: Former Smoker    Smokeless tobacco: Never Used      Comment: pt quit in her 35s    Alcohol use No    Drug use: No    Sexual activity: Not on file       Current Outpatient Prescriptions on File Prior to Visit   Medication Sig    ALPRAZolam (XANAX) 0 5 mg tablet Take 0 5 mg by mouth daily at bedtime as needed      latanoprost (XALATAN) 0 005 % ophthalmic solution 1 drop daily at bedtime    LINZESS 145 MCG CAPS daily in the early morning      losartan-hydrochlorothiazide (HYZAAR) 100-25 MG per tablet Take 1 tablet by mouth daily    SYNTHROID 112 MCG tablet Take by mouth daily       No current facility-administered medications on file prior to visit  Allergies   Allergen Reactions    Food Throat Swelling     FAT FREE SALAD DRESSING    Lactose Intolerance [Lactase] Abdominal Pain    Meloxicam Other (See Comments)     Other reaction(s): nose bleeds    Niacin Facial Swelling     Other reaction(s): Unknown Allergic Reaction    Norco [Hydrocodone-Acetaminophen] Other (See Comments)     Heat intolerance, flushing       Physical Exam:    /82 (BP Location: Right arm, Patient Position: Sitting)   Pulse 91   Temp 98 °F (36 7 °C)   Ht 5' 4" (1 626 m)   Wt 98 kg (216 lb)   BMI 37 08 kg/m²     Constitutional:normal, well developed, well nourished, alert, in no distress and non-toxic and no overt pain behavior   and obese  Eyes:anicteric  HEENT:grossly intact  Neck:supple, symmetric, trachea midline and no masses   Pulmonary:even and unlabored  Cardiovascular:No edema or pitting edema present  Skin:Normal without rashes or lesions and well hydrated  Psychiatric:Mood and affect appropriate  Neurologic:Cranial Nerves II-XII grossly intact  Musculoskeletal:normal     Lumbar Spine Exam    Appearance:  Normal lordosis  Palpation/Tenderness:  left lumbar paraspinal tenderness  right lumbar paraspinal tenderness  Sensory:  no sensory deficits noted  Range of Motion:  Flexion:  Minimally limited  with pain  Extension:  Minimally limited  with pain  Lateral Flexion - Left:  Minimally limited  with pain  Lateral Flexion - Right:  Minimally limited  with pain  Rotation - Left:  Minimally limited  with pain  Rotation - Right:  Minimally limited  with pain  Motor Strength:  Left hip flexion:  5/5  Right hip flexion:  5/5  Left knee extension:  5/5  Right knee extension:  5/5  Left foot dorsiflexion:  5/5  Left foot plantar flexion:  5/5  Right foot dorsiflexion:  5/5  Right foot plantar flexion:  5/5      Imaging    Patient is here for a follow up on back and leg pain

## 2018-12-11 ENCOUNTER — HOSPITAL ENCOUNTER (OUTPATIENT)
Dept: RADIOLOGY | Facility: CLINIC | Age: 68
Discharge: HOME | End: 2018-12-11
Attending: ANESTHESIOLOGY | Admitting: ANESTHESIOLOGY
Payer: MEDICARE

## 2018-12-11 VITALS
RESPIRATION RATE: 20 BRPM | SYSTOLIC BLOOD PRESSURE: 149 MMHG | DIASTOLIC BLOOD PRESSURE: 85 MMHG | HEART RATE: 60 BPM | TEMPERATURE: 98.1 F | OXYGEN SATURATION: 96 %

## 2018-12-11 DIAGNOSIS — M51.16 INTERVERTEBRAL DISC DISORDER WITH RADICULOPATHY OF LUMBAR REGION: ICD-10-CM

## 2018-12-11 PROCEDURE — 64483 NJX AA&/STRD TFRM EPI L/S 1: CPT | Performed by: ANESTHESIOLOGY

## 2018-12-11 RX ORDER — METHYLPREDNISOLONE ACETATE 80 MG/ML
80 INJECTION, SUSPENSION INTRA-ARTICULAR; INTRALESIONAL; INTRAMUSCULAR; PARENTERAL; SOFT TISSUE ONCE
Status: COMPLETED | OUTPATIENT
Start: 2018-12-11 | End: 2018-12-11

## 2018-12-11 RX ORDER — BUPIVACAINE HCL/PF 2.5 MG/ML
30 VIAL (ML) INJECTION ONCE
Status: COMPLETED | OUTPATIENT
Start: 2018-12-11 | End: 2018-12-11

## 2018-12-11 RX ORDER — 0.9 % SODIUM CHLORIDE 0.9 %
10 VIAL (ML) INJECTION ONCE
Status: COMPLETED | OUTPATIENT
Start: 2018-12-11 | End: 2018-12-11

## 2018-12-11 RX ADMIN — Medication 2 ML: at 13:40

## 2018-12-11 RX ADMIN — Medication 4 ML: at 13:38

## 2018-12-11 RX ADMIN — METHYLPREDNISOLONE ACETATE 80 MG: 80 INJECTION, SUSPENSION INTRA-ARTICULAR; INTRALESIONAL; INTRAMUSCULAR; PARENTERAL; SOFT TISSUE at 13:40

## 2018-12-11 RX ADMIN — IOHEXOL 1 ML: 300 INJECTION, SOLUTION INTRAVENOUS at 13:40

## 2018-12-11 RX ADMIN — SODIUM CHLORIDE 4 ML: 9 INJECTION, SOLUTION INTRAMUSCULAR; INTRAVENOUS; SUBCUTANEOUS at 13:38

## 2018-12-11 NOTE — H&P
History of Present Illness: The patient is a 76 y o  female who presents with complaints of lower back and leg pain secondary to lumbar spinal stenosis and is here today for bilateral L3 transforaminal epidural steroid injection      Patient Active Problem List   Diagnosis    Varicose veins of bilateral lower extremities with pain    Morbid obesity due to excess calories (HCC)    History of varicose vein stripping    Primary osteoarthritis of right hip       Past Medical History:   Diagnosis Date    Anxiety     Fibromyalgia     Hypothyroidism     IBS (irritable bowel syndrome)        Past Surgical History:   Procedure Laterality Date    CHOLECYSTECTOMY      HERNIA REPAIR  2010    REPLACEMENT TOTAL KNEE  2013    VEIN SURGERY Bilateral     2002 Dr Kelle Porter         Current Outpatient Prescriptions:     ALPRAZolam (XANAX) 0 5 mg tablet, Take 0 5 mg by mouth daily at bedtime as needed  , Disp: , Rfl: 5    esomeprazole (NexIUM) 20 mg capsule, Take 20 mg by mouth every morning before breakfast, Disp: , Rfl:     latanoprost (XALATAN) 0 005 % ophthalmic solution, 1 drop daily at bedtime, Disp: , Rfl:     LINZESS 145 MCG CAPS, daily in the early morning  , Disp: , Rfl:     losartan-hydrochlorothiazide (HYZAAR) 100-25 MG per tablet, Take 1 tablet by mouth daily, Disp: , Rfl:     SYNTHROID 112 MCG tablet, Take by mouth daily  , Disp: , Rfl:     Current Facility-Administered Medications:     bupivacaine (PF) (MARCAINE) 0 25 % injection 30 mL, 30 mL, Epidural, Once, Cyn Ferreira MD    iohexol (OMNIPAQUE) 300 mg/mL injection 50 mL, 50 mL, Epidural, Once, Cyn Ferreira MD    lidocaine (PF) (XYLOCAINE-MPF) 2 % injection 5 mL, 5 mL, Infiltration, Once, Cyn Ferreira MD    methylPREDNISolone acetate (DEPO-MEDROL) injection 80 mg, 80 mg, Epidural, Once, Cyn Ferreira MD    sodium chloride (PF) 0 9 % injection 10 mL, 10 mL, Infiltration, Once, Cyn Ferreira MD    Allergies   Allergen Reactions    Food Throat Swelling     FAT FREE SALAD DRESSING    Lactose Intolerance [Lactase] Abdominal Pain    Meloxicam Other (See Comments)     Other reaction(s): nose bleeds    Niacin Facial Swelling     Other reaction(s): Unknown Allergic Reaction    Norco [Hydrocodone-Acetaminophen] Other (See Comments)     Heat intolerance, flushing       Physical Exam:   Vitals:    12/11/18 1328   BP: 143/79   Pulse: 68   Resp: 20   Temp: 98 1 °F (36 7 °C)   SpO2: 100%     General: Awake, Alert, Oriented x 3, Mood and affect appropriate  Respiratory: Respirations even and unlabored  Cardiovascular: Peripheral pulses intact; no edema  Musculoskeletal Exam:   Lower back tenderness    ASA Score: 3    Patient/Chart Verification  Patient ID Verified: Verbal  ID Band Applied: No  Consents Confirmed: Procedural, To be obtained in the Pre-Procedure area  H&P( within 30 days) Verified: To be obtained in the Pre-Procedure area  Interval H&P(within 24 hr) Complete (required for Outpatients and Surgery Admit only): To be obtained in the Pre-Procedure area  Allergies Reviewed: Yes  Anticoag/NSAID held?: No  Currently on antibiotics?: No    Assessment:   1   Intervertebral disc disorder with radiculopathy of lumbar region        Plan: B/L L3 TFESI

## 2018-12-11 NOTE — DISCHARGE INSTR - LAB
Epidural Steroid Injection   WHAT YOU NEED TO KNOW:   An epidural steroid injection (CHRISTAL) is a procedure to inject steroid medicine into the epidural space  The epidural space is between your spinal cord and vertebrae  Steroids reduce inflammation and fluid buildup in your spine that may be causing pain  You may be given pain medicine along with the steroids  ACTIVITY  · Do not drive or operate machinery today  · No strenuous activity today - bending, lifting, etc   · You may resume normal activites starting tomorrow - start slowly and as tolerated  · You may shower today, but no tub baths or hot tubs  · You may have numbness for several hours from the local anesthetic  Please use caution and common sense, especially with weight-bearing activities  CARE OF THE INJECTION SITE  · If you have soreness or pain, apply ice to the area today (20 minutes on/20 minutes off)  · Starting tomorrow, you may use warm, moist heat or ice if needed  · You may have an increase or change in your discomfort for 36-48 hours after your treatment  · Apply ice and continue with any pain medication you have been prescribed  · Notify [facility] if you have any of the following: redness, drainage, swelling, headache, stiff neck or fever above 100°F     SPECIAL INSTRUCTIONS  · Our office will contact you in approximately 7 days for a progress report  MEDICATIONS  · Continue to take all routine medications  · Our office may have instructed you to hold some medications  If you have a problem specifically related to your procedure, please call our office at [phone]  Problems not related to your procedure should be directed to your primary care physician

## 2018-12-18 ENCOUNTER — TELEPHONE (OUTPATIENT)
Dept: PAIN MEDICINE | Facility: CLINIC | Age: 68
End: 2018-12-18

## 2018-12-18 NOTE — TELEPHONE ENCOUNTER
Pt reports no improvement post inj on left side  Right side 60% improvement   C/o numbness in left leg daily now  Pain level left side 9/10  Pain level right side 2/10

## 2018-12-21 ENCOUNTER — TRANSCRIBE ORDERS (OUTPATIENT)
Dept: ADMINISTRATIVE | Facility: HOSPITAL | Age: 68
End: 2018-12-21

## 2018-12-21 ENCOUNTER — LAB (OUTPATIENT)
Dept: LAB | Facility: MEDICAL CENTER | Age: 68
End: 2018-12-21
Payer: MEDICARE

## 2018-12-21 DIAGNOSIS — E06.3 CHRONIC LYMPHOCYTIC THYROIDITIS: ICD-10-CM

## 2018-12-21 DIAGNOSIS — Z79.899 ENCOUNTER FOR LONG-TERM (CURRENT) USE OF MEDICATIONS: ICD-10-CM

## 2018-12-21 DIAGNOSIS — M47.816 LUMBAR SPONDYLOSIS: ICD-10-CM

## 2018-12-21 DIAGNOSIS — M15.0 PRIMARY GENERALIZED HYPERTROPHIC OSTEOARTHROSIS: ICD-10-CM

## 2018-12-21 DIAGNOSIS — M35.9 DIFFUSE DISEASE OF CONNECTIVE TISSUE (HCC): ICD-10-CM

## 2018-12-21 DIAGNOSIS — M35.9 DIFFUSE DISEASE OF CONNECTIVE TISSUE (HCC): Primary | ICD-10-CM

## 2018-12-21 LAB
T4 FREE SERPL-MCNC: 1.5 NG/DL (ref 0.76–1.46)
TSH SERPL DL<=0.05 MIU/L-ACNC: 0.57 UIU/ML (ref 0.36–3.74)

## 2018-12-21 PROCEDURE — 84443 ASSAY THYROID STIM HORMONE: CPT

## 2018-12-21 PROCEDURE — 86376 MICROSOMAL ANTIBODY EACH: CPT

## 2018-12-21 PROCEDURE — 36415 COLL VENOUS BLD VENIPUNCTURE: CPT

## 2018-12-21 PROCEDURE — 86800 THYROGLOBULIN ANTIBODY: CPT

## 2018-12-21 PROCEDURE — 84439 ASSAY OF FREE THYROXINE: CPT

## 2018-12-22 LAB
THYROGLOB AB SERPL-ACNC: 1.1 IU/ML (ref 0–0.9)
THYROPEROXIDASE AB SERPL-ACNC: 9 IU/ML (ref 0–34)

## 2019-07-03 ENCOUNTER — TRANSCRIBE ORDERS (OUTPATIENT)
Dept: ADMINISTRATIVE | Facility: HOSPITAL | Age: 69
End: 2019-07-03

## 2019-07-03 ENCOUNTER — LAB (OUTPATIENT)
Dept: LAB | Facility: MEDICAL CENTER | Age: 69
End: 2019-07-03
Payer: MEDICARE

## 2019-07-03 DIAGNOSIS — I10 ESSENTIAL HYPERTENSION, BENIGN: ICD-10-CM

## 2019-07-03 DIAGNOSIS — I10 ESSENTIAL HYPERTENSION, BENIGN: Primary | ICD-10-CM

## 2019-07-03 DIAGNOSIS — E78.2 MIXED HYPERLIPIDEMIA: ICD-10-CM

## 2019-07-03 DIAGNOSIS — R79.9 ABNORMAL BLOOD CHEMISTRY: ICD-10-CM

## 2019-07-03 LAB
ALBUMIN SERPL BCP-MCNC: 3.9 G/DL (ref 3.5–5)
ALP SERPL-CCNC: 103 U/L (ref 46–116)
ALT SERPL W P-5'-P-CCNC: 28 U/L (ref 12–78)
ANION GAP SERPL CALCULATED.3IONS-SCNC: 9 MMOL/L (ref 4–13)
AST SERPL W P-5'-P-CCNC: 23 U/L (ref 5–45)
BACTERIA UR QL AUTO: ABNORMAL /HPF
BASOPHILS # BLD AUTO: 0.04 THOUSANDS/ΜL (ref 0–0.1)
BASOPHILS NFR BLD AUTO: 1 % (ref 0–1)
BILIRUB SERPL-MCNC: 1.75 MG/DL (ref 0.2–1)
BILIRUB UR QL STRIP: NEGATIVE
BUN SERPL-MCNC: 18 MG/DL (ref 5–25)
CALCIUM SERPL-MCNC: 9.4 MG/DL (ref 8.3–10.1)
CHLORIDE SERPL-SCNC: 105 MMOL/L (ref 100–108)
CHOLEST SERPL-MCNC: 182 MG/DL (ref 50–200)
CLARITY UR: ABNORMAL
CO2 SERPL-SCNC: 28 MMOL/L (ref 21–32)
COLOR UR: YELLOW
CREAT SERPL-MCNC: 0.58 MG/DL (ref 0.6–1.3)
EOSINOPHIL # BLD AUTO: 0.1 THOUSAND/ΜL (ref 0–0.61)
EOSINOPHIL NFR BLD AUTO: 2 % (ref 0–6)
ERYTHROCYTE [DISTWIDTH] IN BLOOD BY AUTOMATED COUNT: 13.7 % (ref 11.6–15.1)
GFR SERPL CREATININE-BSD FRML MDRD: 95 ML/MIN/1.73SQ M
GLUCOSE P FAST SERPL-MCNC: 91 MG/DL (ref 65–99)
GLUCOSE UR STRIP-MCNC: NEGATIVE MG/DL
HCT VFR BLD AUTO: 40.7 % (ref 34.8–46.1)
HDLC SERPL-MCNC: 59 MG/DL (ref 40–60)
HGB BLD-MCNC: 13.5 G/DL (ref 11.5–15.4)
HGB UR QL STRIP.AUTO: ABNORMAL
HYALINE CASTS #/AREA URNS LPF: ABNORMAL /LPF
IMM GRANULOCYTES # BLD AUTO: 0.01 THOUSAND/UL (ref 0–0.2)
IMM GRANULOCYTES NFR BLD AUTO: 0 % (ref 0–2)
KETONES UR STRIP-MCNC: NEGATIVE MG/DL
LDH SERPL-CCNC: 169 U/L (ref 81–234)
LDLC SERPL CALC-MCNC: 105 MG/DL (ref 0–100)
LEUKOCYTE ESTERASE UR QL STRIP: ABNORMAL
LYMPHOCYTES # BLD AUTO: 1.75 THOUSANDS/ΜL (ref 0.6–4.47)
LYMPHOCYTES NFR BLD AUTO: 30 % (ref 14–44)
MCH RBC QN AUTO: 30.8 PG (ref 26.8–34.3)
MCHC RBC AUTO-ENTMCNC: 33.2 G/DL (ref 31.4–37.4)
MCV RBC AUTO: 93 FL (ref 82–98)
MONOCYTES # BLD AUTO: 0.53 THOUSAND/ΜL (ref 0.17–1.22)
MONOCYTES NFR BLD AUTO: 9 % (ref 4–12)
NEUTROPHILS # BLD AUTO: 3.43 THOUSANDS/ΜL (ref 1.85–7.62)
NEUTS SEG NFR BLD AUTO: 58 % (ref 43–75)
NITRITE UR QL STRIP: NEGATIVE
NON-SQ EPI CELLS URNS QL MICRO: ABNORMAL /HPF
NONHDLC SERPL-MCNC: 123 MG/DL
NRBC BLD AUTO-RTO: 0 /100 WBCS
PH UR STRIP.AUTO: 7 [PH]
PLATELET # BLD AUTO: 293 THOUSANDS/UL (ref 149–390)
PMV BLD AUTO: 10.1 FL (ref 8.9–12.7)
POTASSIUM SERPL-SCNC: 3.8 MMOL/L (ref 3.5–5.3)
PROT SERPL-MCNC: 7.4 G/DL (ref 6.4–8.2)
PROT UR STRIP-MCNC: ABNORMAL MG/DL
RBC # BLD AUTO: 4.38 MILLION/UL (ref 3.81–5.12)
RBC #/AREA URNS AUTO: ABNORMAL /HPF
SODIUM SERPL-SCNC: 142 MMOL/L (ref 136–145)
SP GR UR STRIP.AUTO: 1.01 (ref 1–1.03)
T3 SERPL-MCNC: 1.1 NG/ML (ref 0.6–1.8)
T4 FREE SERPL-MCNC: 1.52 NG/DL (ref 0.76–1.46)
TRIGL SERPL-MCNC: 88 MG/DL
TSH SERPL DL<=0.05 MIU/L-ACNC: 0.32 UIU/ML (ref 0.36–3.74)
UROBILINOGEN UR QL STRIP.AUTO: 0.2 E.U./DL
WBC # BLD AUTO: 5.86 THOUSAND/UL (ref 4.31–10.16)
WBC #/AREA URNS AUTO: ABNORMAL /HPF

## 2019-07-03 PROCEDURE — 85025 COMPLETE CBC W/AUTO DIFF WBC: CPT | Performed by: INTERNAL MEDICINE

## 2019-07-03 PROCEDURE — 84443 ASSAY THYROID STIM HORMONE: CPT | Performed by: INTERNAL MEDICINE

## 2019-07-03 PROCEDURE — 83615 LACTATE (LD) (LDH) ENZYME: CPT | Performed by: INTERNAL MEDICINE

## 2019-07-03 PROCEDURE — 84480 ASSAY TRIIODOTHYRONINE (T3): CPT

## 2019-07-03 PROCEDURE — 36415 COLL VENOUS BLD VENIPUNCTURE: CPT | Performed by: INTERNAL MEDICINE

## 2019-07-03 PROCEDURE — 80061 LIPID PANEL: CPT | Performed by: INTERNAL MEDICINE

## 2019-07-03 PROCEDURE — 84439 ASSAY OF FREE THYROXINE: CPT | Performed by: INTERNAL MEDICINE

## 2019-07-03 PROCEDURE — 81001 URINALYSIS AUTO W/SCOPE: CPT | Performed by: INTERNAL MEDICINE

## 2019-07-03 PROCEDURE — 80053 COMPREHEN METABOLIC PANEL: CPT | Performed by: INTERNAL MEDICINE

## 2019-07-15 ENCOUNTER — HOSPITAL ENCOUNTER (OUTPATIENT)
Dept: RADIOLOGY | Facility: MEDICAL CENTER | Age: 69
Discharge: HOME | End: 2019-07-15
Payer: MEDICARE

## 2019-07-15 DIAGNOSIS — R79.9 ABNORMAL BLOOD CHEMISTRY: ICD-10-CM

## 2019-07-15 PROCEDURE — 76705 ECHO EXAM OF ABDOMEN: CPT

## 2019-07-30 ENCOUNTER — TRANSCRIBE ORDERS (OUTPATIENT)
Dept: ADMINISTRATIVE | Facility: HOSPITAL | Age: 69
End: 2019-07-30

## 2019-07-30 ENCOUNTER — LAB (OUTPATIENT)
Dept: LAB | Facility: MEDICAL CENTER | Age: 69
End: 2019-07-30
Payer: MEDICARE

## 2019-07-30 DIAGNOSIS — R53.83 OTHER FATIGUE: ICD-10-CM

## 2019-07-30 DIAGNOSIS — I10 ESSENTIAL HYPERTENSION, BENIGN: ICD-10-CM

## 2019-07-30 DIAGNOSIS — E03.9 HYPOTHYROIDISM, UNSPECIFIED TYPE: ICD-10-CM

## 2019-07-30 DIAGNOSIS — K76.0 FATTY METAMORPHOSIS OF LIVER: ICD-10-CM

## 2019-07-30 DIAGNOSIS — E03.9 HYPOTHYROIDISM, UNSPECIFIED TYPE: Primary | ICD-10-CM

## 2019-07-30 PROCEDURE — 86038 ANTINUCLEAR ANTIBODIES: CPT | Performed by: INTERNAL MEDICINE

## 2019-07-30 PROCEDURE — 86430 RHEUMATOID FACTOR TEST QUAL: CPT | Performed by: INTERNAL MEDICINE

## 2019-07-30 PROCEDURE — 86235 NUCLEAR ANTIGEN ANTIBODY: CPT

## 2019-07-30 PROCEDURE — 36415 COLL VENOUS BLD VENIPUNCTURE: CPT | Performed by: INTERNAL MEDICINE

## 2019-07-31 LAB
ENA SCL70 AB SER-ACNC: <0.2 AI (ref 0–0.9)
ENA SS-A AB SER-ACNC: <0.2 AI (ref 0–0.9)
ENA SS-B AB SER-ACNC: <0.2 AI (ref 0–0.9)
RHEUMATOID FACT SER QL LA: NEGATIVE
RYE IGE QN: NEGATIVE

## 2019-10-01 ENCOUNTER — OFFICE VISIT (OUTPATIENT)
Dept: URGENT CARE | Facility: MEDICAL CENTER | Age: 69
End: 2019-10-01
Payer: MEDICARE

## 2019-10-01 VITALS
TEMPERATURE: 98.7 F | DIASTOLIC BLOOD PRESSURE: 70 MMHG | RESPIRATION RATE: 18 BRPM | OXYGEN SATURATION: 95 % | SYSTOLIC BLOOD PRESSURE: 130 MMHG | HEART RATE: 122 BPM

## 2019-10-01 DIAGNOSIS — J02.9 SORE THROAT: Primary | ICD-10-CM

## 2019-10-01 LAB — S PYO AG THROAT QL: NEGATIVE

## 2019-10-01 PROCEDURE — G0463 HOSPITAL OUTPT CLINIC VISIT: HCPCS | Performed by: NURSE PRACTITIONER

## 2019-10-01 PROCEDURE — 87880 STREP A ASSAY W/OPTIC: CPT | Performed by: NURSE PRACTITIONER

## 2019-10-01 PROCEDURE — 87070 CULTURE OTHR SPECIMN AEROBIC: CPT | Performed by: NURSE PRACTITIONER

## 2019-10-01 PROCEDURE — 99213 OFFICE O/P EST LOW 20 MIN: CPT | Performed by: NURSE PRACTITIONER

## 2019-10-01 NOTE — PROGRESS NOTES
[facility]        NAME: Jace Bartholomew is a 71 y o  female  : 1950    MRN: [id number]  DATE: 2019  TIME: 2:10 PM    Assessment and Plan   Sore throat [J02 9]  1  Sore throat  POCT rapid strepA    Throat culture         Patient Instructions   Rapid strep is negative  We will send it or culture and call you if it is positive  Do not take bactrim  Tylenol as needed for pain and fever  Throat lozenges, honey, salt water gargles for symptomatic relief  Follow up with your PCP for worsening or persistent symptoms  Follow up with PCP in 3-5 days  Proceed to  ER if symptoms worsen  Chief Complaint     Chief Complaint   Patient presents with    Sore Throat     x 2 days, started with fever m chills and body aches this am  took bactrim because she had some left over          History of Present Illness       Patient is a 71year old female with sore throat  Started 2 nights ago  Associated with fever, chills, cough, diarrhea, and body aches  Denies ear pain, nausea, nasal congestion, and vomiting  She has bactrim at home from a prior sinus infection that she took 1 tablet of  No other OTC medications for symptomatic relief  Review of Systems   Review of Systems   Constitutional: Positive for chills, fatigue and fever  Negative for activity change  HENT: Positive for sore throat  Negative for congestion and rhinorrhea  Respiratory: Positive for cough  Gastrointestinal: Positive for diarrhea  Negative for abdominal pain, nausea and vomiting  Musculoskeletal: Positive for myalgias  Neurological: Negative for headaches           Current Medications       Current Outpatient Medications:     ALPRAZolam (XANAX) 0 5 mg tablet, Take 0 5 mg by mouth daily at bedtime as needed  , Disp: , Rfl: 5    esomeprazole (NexIUM) 20 mg capsule, Take 20 mg by mouth every morning before breakfast, Disp: , Rfl:     latanoprost (XALATAN) 0 005 % ophthalmic solution, 1 drop daily at bedtime, Disp: , Rfl:     LINZESS 145 MCG CAPS, daily in the early morning  , Disp: , Rfl:     losartan-hydrochlorothiazide (HYZAAR) 100-25 MG per tablet, Take 1 tablet by mouth daily, Disp: , Rfl:     SYNTHROID 112 MCG tablet, Take by mouth daily  , Disp: , Rfl:     Current Allergies     Allergies as of 10/01/2019 - Reviewed 10/01/2019   Allergen Reaction Noted    Food Throat Swelling 05/29/2018    Lactose intolerance [lactase] Abdominal Pain 05/29/2018    Meloxicam Other (See Comments) 02/14/2017    Niacin Facial Swelling 02/14/2017    Norco [hydrocodone-acetaminophen] Other (See Comments) 05/29/2018            The following portions of the patient's history were reviewed and updated as appropriate: allergies, current medications, past family history, past medical history, past social history, past surgical history and problem list      Past Medical History:   Diagnosis Date    Anxiety     Fibromyalgia     Hypothyroidism     IBS (irritable bowel syndrome)        Past Surgical History:   Procedure Laterality Date    CHOLECYSTECTOMY      HERNIA REPAIR  2010    REPLACEMENT TOTAL KNEE  2013    VEIN SURGERY Bilateral     2002 Dr Ne Mack       Family History   Problem Relation Age of Onset    Heart failure Mother     Lung cancer Father     Hypertension Sister     Hypertension Brother          Medications have been verified  Objective   /70   Pulse (!) 122   Temp 98 7 °F (37 1 °C)   Resp 18   SpO2 95%      Rapid strep: negative   Physical Exam     Physical Exam   Constitutional: She is oriented to person, place, and time  She appears well-developed and well-nourished  She is active  She appears ill  No distress  HENT:   Head: Normocephalic     Right Ear: Hearing, tympanic membrane, external ear and ear canal normal    Left Ear: Hearing, tympanic membrane, external ear and ear canal normal    Nose: Nose normal    Mouth/Throat: Oropharynx is clear and moist    Cardiovascular: Regular rhythm, S1 normal, S2 normal and normal heart sounds  Tachycardia present  Pulmonary/Chest: Effort normal and breath sounds normal  No respiratory distress  Neurological: She is alert and oriented to person, place, and time  She is not disoriented  Skin: Skin is warm and dry

## 2019-10-03 LAB — BACTERIA THROAT CULT: NORMAL

## 2019-11-07 ENCOUNTER — APPOINTMENT (OUTPATIENT)
Dept: LAB | Facility: MEDICAL CENTER | Age: 69
End: 2019-11-07
Payer: MEDICARE

## 2019-11-07 ENCOUNTER — TRANSCRIBE ORDERS (OUTPATIENT)
Dept: ADMINISTRATIVE | Facility: HOSPITAL | Age: 69
End: 2019-11-07

## 2019-11-07 ENCOUNTER — APPOINTMENT (OUTPATIENT)
Dept: RADIOLOGY | Facility: MEDICAL CENTER | Age: 69
End: 2019-11-07
Payer: MEDICARE

## 2019-11-07 DIAGNOSIS — E03.9 HYPOTHYROIDISM, UNSPECIFIED TYPE: ICD-10-CM

## 2019-11-07 DIAGNOSIS — R05.9 COUGH: Primary | ICD-10-CM

## 2019-11-07 DIAGNOSIS — I10 ESSENTIAL HYPERTENSION, BENIGN: ICD-10-CM

## 2019-11-07 DIAGNOSIS — R63.4 LOSS OF WEIGHT: Primary | ICD-10-CM

## 2019-11-07 DIAGNOSIS — R05.9 COUGH: ICD-10-CM

## 2019-11-07 DIAGNOSIS — E78.2 MIXED HYPERLIPIDEMIA: ICD-10-CM

## 2019-11-07 LAB
25(OH)D3 SERPL-MCNC: 49 NG/ML (ref 30–100)
ALBUMIN SERPL BCP-MCNC: 4 G/DL (ref 3.5–5)
ALP SERPL-CCNC: 100 U/L (ref 46–116)
ALT SERPL W P-5'-P-CCNC: 36 U/L (ref 12–78)
ANION GAP SERPL CALCULATED.3IONS-SCNC: 7 MMOL/L (ref 4–13)
AST SERPL W P-5'-P-CCNC: 31 U/L (ref 5–45)
BASOPHILS # BLD AUTO: 0.04 THOUSANDS/ΜL (ref 0–0.1)
BASOPHILS NFR BLD AUTO: 1 % (ref 0–1)
BILIRUB SERPL-MCNC: 1.54 MG/DL (ref 0.2–1)
BILIRUB UR QL STRIP: NEGATIVE
BUN SERPL-MCNC: 15 MG/DL (ref 5–25)
CALCIUM SERPL-MCNC: 9.9 MG/DL (ref 8.3–10.1)
CHLORIDE SERPL-SCNC: 106 MMOL/L (ref 100–108)
CHOLEST SERPL-MCNC: 190 MG/DL (ref 50–200)
CLARITY UR: CLEAR
CO2 SERPL-SCNC: 28 MMOL/L (ref 21–32)
COLOR UR: YELLOW
CREAT SERPL-MCNC: 0.62 MG/DL (ref 0.6–1.3)
EOSINOPHIL # BLD AUTO: 0.03 THOUSAND/ΜL (ref 0–0.61)
EOSINOPHIL NFR BLD AUTO: 1 % (ref 0–6)
ERYTHROCYTE [DISTWIDTH] IN BLOOD BY AUTOMATED COUNT: 13.4 % (ref 11.6–15.1)
ERYTHROCYTE [SEDIMENTATION RATE] IN BLOOD: 18 MM/HOUR (ref 0–20)
GFR SERPL CREATININE-BSD FRML MDRD: 92 ML/MIN/1.73SQ M
GLUCOSE P FAST SERPL-MCNC: 84 MG/DL (ref 65–99)
GLUCOSE UR STRIP-MCNC: NEGATIVE MG/DL
HCT VFR BLD AUTO: 42.1 % (ref 34.8–46.1)
HDLC SERPL-MCNC: 72 MG/DL
HGB BLD-MCNC: 13.4 G/DL (ref 11.5–15.4)
HGB UR QL STRIP.AUTO: NEGATIVE
IMM GRANULOCYTES # BLD AUTO: 0.01 THOUSAND/UL (ref 0–0.2)
IMM GRANULOCYTES NFR BLD AUTO: 0 % (ref 0–2)
IRON SERPL-MCNC: 104 UG/DL (ref 50–170)
KETONES UR STRIP-MCNC: NEGATIVE MG/DL
LDH SERPL-CCNC: 190 U/L (ref 81–234)
LDLC SERPL CALC-MCNC: 102 MG/DL (ref 0–100)
LEUKOCYTE ESTERASE UR QL STRIP: NEGATIVE
LYMPHOCYTES # BLD AUTO: 1.68 THOUSANDS/ΜL (ref 0.6–4.47)
LYMPHOCYTES NFR BLD AUTO: 35 % (ref 14–44)
MCH RBC QN AUTO: 31.2 PG (ref 26.8–34.3)
MCHC RBC AUTO-ENTMCNC: 31.8 G/DL (ref 31.4–37.4)
MCV RBC AUTO: 98 FL (ref 82–98)
MONOCYTES # BLD AUTO: 0.42 THOUSAND/ΜL (ref 0.17–1.22)
MONOCYTES NFR BLD AUTO: 9 % (ref 4–12)
NEUTROPHILS # BLD AUTO: 2.67 THOUSANDS/ΜL (ref 1.85–7.62)
NEUTS SEG NFR BLD AUTO: 54 % (ref 43–75)
NITRITE UR QL STRIP: NEGATIVE
NONHDLC SERPL-MCNC: 118 MG/DL
NRBC BLD AUTO-RTO: 0 /100 WBCS
PH UR STRIP.AUTO: 7.5 [PH]
PLATELET # BLD AUTO: 308 THOUSANDS/UL (ref 149–390)
PMV BLD AUTO: 10.1 FL (ref 8.9–12.7)
POTASSIUM SERPL-SCNC: 3.7 MMOL/L (ref 3.5–5.3)
PROT SERPL-MCNC: 7.7 G/DL (ref 6.4–8.2)
PROT UR STRIP-MCNC: NEGATIVE MG/DL
RBC # BLD AUTO: 4.29 MILLION/UL (ref 3.81–5.12)
SODIUM SERPL-SCNC: 141 MMOL/L (ref 136–145)
SP GR UR STRIP.AUTO: 1.01 (ref 1–1.03)
T3 SERPL-MCNC: 1.1 NG/ML (ref 0.6–1.8)
TRIGL SERPL-MCNC: 81 MG/DL
TSH SERPL DL<=0.05 MIU/L-ACNC: 0.74 UIU/ML (ref 0.36–3.74)
UROBILINOGEN UR QL STRIP.AUTO: 0.2 E.U./DL
WBC # BLD AUTO: 4.85 THOUSAND/UL (ref 4.31–10.16)

## 2019-11-07 PROCEDURE — 80061 LIPID PANEL: CPT

## 2019-11-07 PROCEDURE — 84443 ASSAY THYROID STIM HORMONE: CPT

## 2019-11-07 PROCEDURE — 71046 X-RAY EXAM CHEST 2 VIEWS: CPT

## 2019-11-07 PROCEDURE — 36415 COLL VENOUS BLD VENIPUNCTURE: CPT

## 2019-11-07 PROCEDURE — 83540 ASSAY OF IRON: CPT

## 2019-11-07 PROCEDURE — 84480 ASSAY TRIIODOTHYRONINE (T3): CPT

## 2019-11-07 PROCEDURE — 85652 RBC SED RATE AUTOMATED: CPT

## 2019-11-07 PROCEDURE — 82306 VITAMIN D 25 HYDROXY: CPT

## 2019-11-07 PROCEDURE — 85025 COMPLETE CBC W/AUTO DIFF WBC: CPT

## 2019-11-07 PROCEDURE — 80053 COMPREHEN METABOLIC PANEL: CPT

## 2019-11-07 PROCEDURE — 81003 URINALYSIS AUTO W/O SCOPE: CPT | Performed by: INTERNAL MEDICINE

## 2019-11-07 PROCEDURE — 83615 LACTATE (LD) (LDH) ENZYME: CPT

## 2019-11-13 ENCOUNTER — HOSPITAL ENCOUNTER (OUTPATIENT)
Dept: RADIOLOGY | Facility: MEDICAL CENTER | Age: 69
Discharge: HOME | End: 2019-11-13
Payer: MEDICARE

## 2019-11-13 DIAGNOSIS — R63.4 LOSS OF WEIGHT: ICD-10-CM

## 2019-11-13 PROCEDURE — 74177 CT ABD & PELVIS W/CONTRAST: CPT

## 2019-11-13 RX ADMIN — IOHEXOL 100 ML: 350 INJECTION, SOLUTION INTRAVENOUS at 11:04

## 2019-12-09 PROBLEM — R22.9 SUBCUTANEOUS MASS: Status: ACTIVE | Noted: 2019-12-09

## 2020-03-19 ENCOUNTER — APPOINTMENT (OUTPATIENT)
Dept: LAB | Facility: CLINIC | Age: 70
End: 2020-03-19
Payer: MEDICARE

## 2020-03-19 ENCOUNTER — TRANSCRIBE ORDERS (OUTPATIENT)
Dept: ADMINISTRATIVE | Facility: HOSPITAL | Age: 70
End: 2020-03-19

## 2020-03-19 ENCOUNTER — HOSPITAL ENCOUNTER (OUTPATIENT)
Dept: NON INVASIVE DIAGNOSTICS | Facility: CLINIC | Age: 70
Discharge: HOME | End: 2020-03-19
Payer: MEDICARE

## 2020-03-19 ENCOUNTER — HOSPITAL ENCOUNTER (OUTPATIENT)
Dept: CT IMAGING | Facility: HOSPITAL | Age: 70
Discharge: HOME | End: 2020-03-19
Attending: INTERNAL MEDICINE
Payer: MEDICARE

## 2020-03-19 ENCOUNTER — HOSPITAL ENCOUNTER (EMERGENCY)
Facility: HOSPITAL | Age: 70
Discharge: HOME | End: 2020-03-19
Attending: EMERGENCY MEDICINE | Admitting: EMERGENCY MEDICINE
Payer: MEDICARE

## 2020-03-19 VITALS
DIASTOLIC BLOOD PRESSURE: 80 MMHG | RESPIRATION RATE: 18 BRPM | OXYGEN SATURATION: 99 % | BODY MASS INDEX: 36.84 KG/M2 | TEMPERATURE: 98.9 F | SYSTOLIC BLOOD PRESSURE: 149 MMHG | HEART RATE: 88 BPM | WEIGHT: 212.96 LBS

## 2020-03-19 DIAGNOSIS — R06.2 WHEEZING: ICD-10-CM

## 2020-03-19 DIAGNOSIS — R07.9 CHEST PAIN, UNSPECIFIED TYPE: ICD-10-CM

## 2020-03-19 DIAGNOSIS — R60.0 LOCALIZED EDEMA: ICD-10-CM

## 2020-03-19 DIAGNOSIS — R00.0 TACHYCARDIA, UNSPECIFIED: ICD-10-CM

## 2020-03-19 DIAGNOSIS — Z01.812 ENCOUNTER FOR PREPROCEDURAL LABORATORY EXAMINATION: ICD-10-CM

## 2020-03-19 DIAGNOSIS — Z01.812 ENCOUNTER FOR PREPROCEDURAL LABORATORY EXAMINATION: Primary | ICD-10-CM

## 2020-03-19 DIAGNOSIS — M79.602 LEFT ARM PAIN: Primary | ICD-10-CM

## 2020-03-19 LAB
ALBUMIN SERPL BCP-MCNC: 3.9 G/DL (ref 3.5–5)
ALP SERPL-CCNC: 113 U/L (ref 46–116)
ALT SERPL W P-5'-P-CCNC: 30 U/L (ref 12–78)
ANION GAP SERPL CALCULATED.3IONS-SCNC: 10 MMOL/L (ref 4–13)
AST SERPL W P-5'-P-CCNC: 27 U/L (ref 5–45)
BASOPHILS # BLD AUTO: 0.03 THOUSANDS/ΜL (ref 0–0.1)
BASOPHILS NFR BLD AUTO: 1 % (ref 0–1)
BILIRUB SERPL-MCNC: 1.39 MG/DL (ref 0.2–1)
BUN SERPL-MCNC: 20 MG/DL (ref 5–25)
BUN SERPL-MCNC: 22 MG/DL (ref 5–25)
CALCIUM SERPL-MCNC: 9.6 MG/DL (ref 8.3–10.1)
CHLORIDE SERPL-SCNC: 103 MMOL/L (ref 100–108)
CO2 SERPL-SCNC: 28 MMOL/L (ref 21–32)
CREAT SERPL-MCNC: 0.67 MG/DL (ref 0.6–1.3)
CREAT SERPL-MCNC: 0.72 MG/DL (ref 0.6–1.3)
EOSINOPHIL # BLD AUTO: 0.03 THOUSAND/ΜL (ref 0–0.61)
EOSINOPHIL NFR BLD AUTO: 1 % (ref 0–6)
ERYTHROCYTE [DISTWIDTH] IN BLOOD BY AUTOMATED COUNT: 13 % (ref 11.6–15.1)
GFR SERPL CREATININE-BSD FRML MDRD: 86 ML/MIN/1.73SQ M
GFR SERPL CREATININE-BSD FRML MDRD: 90 ML/MIN/1.73SQ M
GLUCOSE SERPL-MCNC: 98 MG/DL (ref 65–140)
HCT VFR BLD AUTO: 41.3 % (ref 34.8–46.1)
HGB BLD-MCNC: 13.8 G/DL (ref 11.5–15.4)
IMM GRANULOCYTES # BLD AUTO: 0.01 THOUSAND/UL (ref 0–0.2)
IMM GRANULOCYTES NFR BLD AUTO: 0 % (ref 0–2)
LYMPHOCYTES # BLD AUTO: 1.84 THOUSANDS/ΜL (ref 0.6–4.47)
LYMPHOCYTES NFR BLD AUTO: 32 % (ref 14–44)
MCH RBC QN AUTO: 32.2 PG (ref 26.8–34.3)
MCHC RBC AUTO-ENTMCNC: 33.4 G/DL (ref 31.4–37.4)
MCV RBC AUTO: 97 FL (ref 82–98)
MONOCYTES # BLD AUTO: 0.61 THOUSAND/ΜL (ref 0.17–1.22)
MONOCYTES NFR BLD AUTO: 11 % (ref 4–12)
NEUTROPHILS # BLD AUTO: 3.21 THOUSANDS/ΜL (ref 1.85–7.62)
NEUTS SEG NFR BLD AUTO: 55 % (ref 43–75)
NRBC BLD AUTO-RTO: 0 /100 WBCS
PLATELET # BLD AUTO: 295 THOUSANDS/UL (ref 149–390)
PMV BLD AUTO: 9.3 FL (ref 8.9–12.7)
POTASSIUM SERPL-SCNC: 4.3 MMOL/L (ref 3.5–5.3)
PROT SERPL-MCNC: 8 G/DL (ref 6.4–8.2)
RBC # BLD AUTO: 4.28 MILLION/UL (ref 3.81–5.12)
SODIUM SERPL-SCNC: 141 MMOL/L (ref 136–145)
TROPONIN I SERPL-MCNC: 0.02 NG/ML
WBC # BLD AUTO: 5.73 THOUSAND/UL (ref 4.31–10.16)

## 2020-03-19 PROCEDURE — 99283 EMERGENCY DEPT VISIT LOW MDM: CPT | Performed by: EMERGENCY MEDICINE

## 2020-03-19 PROCEDURE — 99283 EMERGENCY DEPT VISIT LOW MDM: CPT

## 2020-03-19 PROCEDURE — 82565 ASSAY OF CREATININE: CPT

## 2020-03-19 PROCEDURE — 36415 COLL VENOUS BLD VENIPUNCTURE: CPT

## 2020-03-19 PROCEDURE — 84520 ASSAY OF UREA NITROGEN: CPT

## 2020-03-19 PROCEDURE — 93971 EXTREMITY STUDY: CPT

## 2020-03-19 PROCEDURE — 93005 ELECTROCARDIOGRAM TRACING: CPT

## 2020-03-19 PROCEDURE — 71275 CT ANGIOGRAPHY CHEST: CPT

## 2020-03-19 PROCEDURE — 80053 COMPREHEN METABOLIC PANEL: CPT | Performed by: EMERGENCY MEDICINE

## 2020-03-19 PROCEDURE — 85025 COMPLETE CBC W/AUTO DIFF WBC: CPT | Performed by: EMERGENCY MEDICINE

## 2020-03-19 PROCEDURE — 84484 ASSAY OF TROPONIN QUANT: CPT | Performed by: EMERGENCY MEDICINE

## 2020-03-19 PROCEDURE — 93971 EXTREMITY STUDY: CPT | Performed by: SURGERY

## 2020-03-19 RX ORDER — LOSARTAN POTASSIUM 100 MG/1
100 TABLET ORAL DAILY
COMMUNITY

## 2020-03-19 RX ORDER — MAGNESIUM HYDROXIDE/ALUMINUM HYDROXICE/SIMETHICONE 120; 1200; 1200 MG/30ML; MG/30ML; MG/30ML
30 SUSPENSION ORAL ONCE
Status: COMPLETED | OUTPATIENT
Start: 2020-03-19 | End: 2020-03-19

## 2020-03-19 RX ADMIN — IOHEXOL 85 ML: 350 INJECTION, SOLUTION INTRAVENOUS at 11:34

## 2020-03-19 RX ADMIN — ALUMINUM HYDROXIDE, MAGNESIUM HYDROXIDE, AND SIMETHICONE 30 ML: 200; 200; 20 SUSPENSION ORAL at 17:34

## 2020-03-19 NOTE — ED PROCEDURE NOTE
PROCEDURE  ECG 12 Lead Documentation Only  Date/Time: 3/19/2020 5:38 PM  Performed by: Yvonne Hill MD  Authorized by: Yvonne Hill MD     Indications / Diagnosis:  LEFT ARM/CP   ECG reviewed by me, the ED Provider: yes    Patient location:  ED and bedside  Previous ECG:     Previous ECG:  Compared to current    Comparison ECG info:  11/20/15- NO SIGN CHANGES    Similarity:  No change    Comparison to cardiac monitor: Yes    Interpretation:     Interpretation: non-specific    Rate:     ECG rate:  65    ECG rate assessment: normal    Rhythm:     Rhythm: sinus rhythm    Ectopy:     Ectopy: none    QRS:     QRS axis:  Normal    QRS intervals:   Wide  Conduction:     Conduction: abnormal      Abnormal conduction: non-specific intraventricular conduction delay    ST segments:     ST segments:  Normal  T waves:     T waves: flattening      Flattening:  AVL  Q waves:     Q waves:  V1  Other findings:     Other findings: poor R wave progression and U wave    Comments:      NO ECG SIGNS OF ISCHEMIA/ INJURY / R HEART STRAIN / Cesar Taylor MD  03/19/20 7958

## 2020-03-19 NOTE — ED PROVIDER NOTES
History  Chief Complaint   Patient presents with    Arm Pain     per pt "she has been having some left arm pain with some reflux and a headache with some burning ih her chest "     71 YR FEMALE C/O 3 DAYS OF  BURNNG TYPE  CP- WHICH SHE DESCRIBES AS HER GERD  THAT SHE HAS HAD FOR YRS- ON A PPI-- NO PROGRESSIVE DYSPHAGIA/  NO MELENA/ NO N/V- -- SINCE LAST NIGHT AT 9 PM WITH BURNING TYPE PAIN IN LEFT UPPER ARM THRU SHOULDER  TO NECK AND ACROSS CHEST - THIS HAS BEEN CONSTANT SINCE 9 PM LAST  DOES STATE IS WORSE WITH EXERTION- BUT NOT A [place] TYPE PICTURE-  BUT NO SOB- NO FEVERS/URTI/COUGH- NO FOCAL NEURO COMPS-- WENT TO SEE PMD- HAD  OUTPT CTA OF CHEST AND BUE DOPPLER U/S OF LOWER LEGS THEN SENT TO ER FOR EVAL - PT STATES SIMILAR SYMPTOMS HAPPEN IN THE PAST - CAME TO ER AND TOLD WAS A MUSCULAR ISSUE NOT HER HEART      History provided by:  Patient   used: No    Arm Pain   Associated symptoms: chest pain        Prior to Admission Medications   Prescriptions Last Dose Informant Patient Reported? Taking?    ALPRAZolam (XANAX) 0 5 mg tablet  Self Yes Yes   Sig: Take 0 5 mg by mouth daily at bedtime as needed     LINZESS 145 MCG CAPS  Self Yes Yes   Si mcg daily in the early morning    SYNTHROID 112 MCG tablet   Yes No   Sig: Take 100 mcg by mouth daily    esomeprazole (NexIUM) 20 mg capsule   Yes Yes   Sig: Take 20 mg by mouth every morning before breakfast   latanoprost (XALATAN) 0 005 % ophthalmic solution   Yes Yes   Si drop daily at bedtime   losartan (COZAAR) 100 MG tablet   Yes Yes   Sig: Take 100 mg by mouth daily   losartan-hydrochlorothiazide (HYZAAR) 100-25 MG per tablet   Yes No   Sig: Take 1 tablet by mouth as needed       Facility-Administered Medications: None       Past Medical History:   Diagnosis Date    Anxiety     Fibromyalgia     Hypothyroidism     IBS (irritable bowel syndrome)        Past Surgical History:   Procedure Laterality Date    CHOLECYSTECTOMY      HERNIA REPAIR  2010    REPLACEMENT TOTAL KNEE  2013    VEIN SURGERY Bilateral     2002 Dr Darnell Lesch       Family History   Problem Relation Age of Onset    Heart failure Mother     Lung cancer Father     Hypertension Sister     Hypertension Brother      I have reviewed and agree with the history as documented  E-Cigarette/Vaping     E-Cigarette/Vaping Substances     Social History     Tobacco Use    Smoking status: Former Smoker    Smokeless tobacco: Never Used    Tobacco comment: pt quit in her 35s   Substance Use Topics    Alcohol use: No    Drug use: No       Review of Systems   Constitutional: Negative  HENT: Negative  Eyes: Negative  Respiratory: Negative  Cardiovascular: Positive for chest pain  Negative for palpitations and leg swelling  Gastrointestinal: Negative  GERD SYMPTOMS   Endocrine: Negative  Genitourinary: Negative  Musculoskeletal: Negative  LEFT ARM PAIN    Skin: Negative  Allergic/Immunologic: Negative  Neurological: Negative  Hematological: Negative  Psychiatric/Behavioral: Negative  Physical Exam  Physical Exam   Constitutional: She is oriented to person, place, and time  She appears well-developed and well-nourished  No distress  AVSS- PULSE OX 99 % ON RA- INTERPRETATION IS NORMAL- NO INTERVENTION -  WELL APPEARING- IN NAD    HENT:   Head: Normocephalic and atraumatic  Nose: Nose normal    Mouth/Throat: Oropharynx is clear and moist  No oropharyngeal exudate  Eyes: Pupils are equal, round, and reactive to light  Conjunctivae and EOM are normal  Right eye exhibits no discharge  Left eye exhibits no discharge  No scleral icterus  MM PINK   Neck: Normal range of motion  Neck supple  No JVD present  No tracheal deviation present  No thyromegaly present  NO PMT C//T/L/S SPINE   Cardiovascular: Normal rate, regular rhythm, normal heart sounds and intact distal pulses  Exam reveals no gallop and no friction rub     No murmur heard   Pulmonary/Chest: Effort normal and breath sounds normal  No stridor  No respiratory distress  She has no wheezes  She has no rales  She exhibits no tenderness  Abdominal: Soft  Bowel sounds are normal  She exhibits no distension and no mass  There is no tenderness  There is no rebound and no guarding  No hernia  SOFT ROMELIA/ ND- NO HSM/ NO CVA TENDENRESS- NO PERITONEAL SIGNS- NO PULSATILE ABD MASS/BRUIT/ TENDERNESS   Musculoskeletal: Normal range of motion  She exhibits no edema, tenderness or deformity  EQUAL BILATERAL  RADIAL/DP PULSES- NO BLE EDEMA/CALF TENDERNESS/ASYM/ ERYTHEMA   Lymphadenopathy:     She has no cervical adenopathy  Neurological: She is alert and oriented to person, place, and time  No cranial nerve deficit or sensory deficit  She exhibits normal muscle tone  Coordination normal    NORMAL NON FOCAL NEURO EXAM- - COMPLETELY NORMAL BUE/ BLE STRENGTH/SENSATION    Skin: Skin is warm  Capillary refill takes less than 2 seconds  No rash noted  She is not diaphoretic  No erythema  No pallor  Psychiatric: She has a normal mood and affect  Her behavior is normal  Judgment and thought content normal    Nursing note and vitals reviewed        Vital Signs  ED Triage Vitals   Temperature Pulse Respirations Blood Pressure SpO2   03/19/20 1652 03/19/20 1646 03/19/20 1646 03/19/20 1646 03/19/20 1646   98 9 °F (37 2 °C) 88 18 149/80 99 %      Temp Source Heart Rate Source Patient Position - Orthostatic VS BP Location FiO2 (%)   03/19/20 1646 03/19/20 1646 03/19/20 1646 03/19/20 1646 --   Oral Monitor Sitting Right arm       Pain Score       --                  Vitals:    03/19/20 1646   BP: 149/80   Pulse: 88   Patient Position - Orthostatic VS: Sitting         Visual Acuity      ED Medications  Medications   aluminum-magnesium hydroxide-simethicone (MYLANTA) 200-200-20 mg/5 mL oral suspension 30 mL (has no administration in time range)       Diagnostic Studies  Results Reviewed     Procedure Component Value Units Date/Time    CBC and differential [[id number]]     Lab Status:  No result Specimen:  Blood     Comprehensive metabolic panel [[id number]]     Lab Status:  No result Specimen:  Blood     Troponin I [[id number]]     Lab Status:  No result Specimen:  Blood                  No orders to display              Procedures  Procedures         ED Course  ED Course as of Mar 19 1753   Thu Mar 19, 2020   1651 - er md note- cta of chest  report for pe-   from  today as outpt reviewed by er md --  pt states came from ble doppler u/s- which she is stating was neg fror dvt -       1653 Er md note- out pt bun/cr from today was normal      1654 Er md note- bue pulse ox pleth 100 % - NORMAL - EQUAL      1656 - ER MD NOTE- PT TOOK ALL OF HER MEDICATIONS FOR TODAY UP UNTIL NOW       1748 - ER MD  MEDICAL DECISION MAKING NOTE- BASED ON H AND P - ER MD CLINICAL SUSPICION OF ACS IS LOW- WILL D/C --                                     MDM      Disposition  Final diagnoses:   None     ED Disposition     None      Follow-up Information    None         Patient's Medications   Discharge Prescriptions    No medications on file     No discharge procedures on file      PDMP Review     None          ED Provider  Electronically Signed by           Timoteo Dior MD  03/19/20 Rebel Goss MD  03/19/20 7842

## 2020-03-19 NOTE — DISCHARGE INSTRUCTIONS
DIAGNOSIS; LEFT ARM PAIN/CHEST PAIN     - ACTIVITY AS TOLERATED     -  - IF SYMPTOMS PERSIST- PLEASE CALL DR LINDSEY TO SCHEDULE AN APPOINTMENT FOR A RECHECK WITHIN 1 WEEK    - PLEASE RETURN TO  THE ER FOR ANY WORSENING CHEST PAIN/ ANY INCREASING DIFFICULTY BREATHING/ ANY BLOODY/COFFEE GROUND VOMITUS/ ANY BLACK TARRY STOOLS-  OR ANY LEFT ARM WEAKNESS  LEFT ARM  PAINFUL RED RASH

## 2020-03-20 LAB
ATRIAL RATE: 65 BPM
P AXIS: 55 DEGREES
PR INTERVAL: 182 MS
QRS AXIS: -11 DEGREES
QRSD INTERVAL: 126 MS
QT INTERVAL: 418 MS
QTC INTERVAL: 434 MS
T WAVE AXIS: 60 DEGREES
VENTRICULAR RATE: 65 BPM

## 2020-03-20 PROCEDURE — 93010 ELECTROCARDIOGRAM REPORT: CPT | Performed by: INTERNAL MEDICINE

## 2020-09-02 ENCOUNTER — LAB (OUTPATIENT)
Dept: LAB | Facility: MEDICAL CENTER | Age: 70
End: 2020-09-02
Payer: MEDICARE

## 2020-09-02 ENCOUNTER — TRANSCRIBE ORDERS (OUTPATIENT)
Dept: ADMINISTRATIVE | Facility: HOSPITAL | Age: 70
End: 2020-09-02

## 2020-09-02 DIAGNOSIS — R79.9 ABNORMAL BLOOD CHEMISTRY: ICD-10-CM

## 2020-09-02 DIAGNOSIS — I10 ESSENTIAL HYPERTENSION, BENIGN: ICD-10-CM

## 2020-09-02 DIAGNOSIS — E78.2 MIXED HYPERLIPIDEMIA: ICD-10-CM

## 2020-09-02 DIAGNOSIS — I10 ESSENTIAL HYPERTENSION, BENIGN: Primary | ICD-10-CM

## 2020-09-02 LAB
ALBUMIN SERPL BCP-MCNC: 3.9 G/DL (ref 3.5–5)
ALP SERPL-CCNC: 95 U/L (ref 46–116)
ALT SERPL W P-5'-P-CCNC: 26 U/L (ref 12–78)
ANION GAP SERPL CALCULATED.3IONS-SCNC: 6 MMOL/L (ref 4–13)
AST SERPL W P-5'-P-CCNC: 27 U/L (ref 5–45)
BASOPHILS # BLD AUTO: 0.05 THOUSANDS/ΜL (ref 0–0.1)
BASOPHILS NFR BLD AUTO: 1 % (ref 0–1)
BILIRUB SERPL-MCNC: 1.74 MG/DL (ref 0.2–1)
BUN SERPL-MCNC: 24 MG/DL (ref 5–25)
CALCIUM SERPL-MCNC: 9.7 MG/DL (ref 8.3–10.1)
CHLORIDE SERPL-SCNC: 104 MMOL/L (ref 100–108)
CHOLEST SERPL-MCNC: 210 MG/DL (ref 50–200)
CO2 SERPL-SCNC: 28 MMOL/L (ref 21–32)
CREAT SERPL-MCNC: 0.64 MG/DL (ref 0.6–1.3)
CRP SERPL QL: <3 MG/L
EOSINOPHIL # BLD AUTO: 0.11 THOUSAND/ΜL (ref 0–0.61)
EOSINOPHIL NFR BLD AUTO: 2 % (ref 0–6)
ERYTHROCYTE [DISTWIDTH] IN BLOOD BY AUTOMATED COUNT: 13.3 % (ref 11.6–15.1)
ERYTHROCYTE [SEDIMENTATION RATE] IN BLOOD: 32 MM/HOUR (ref 0–29)
FERRITIN SERPL-MCNC: 291 NG/ML (ref 8–388)
GFR SERPL CREATININE-BSD FRML MDRD: 91 ML/MIN/1.73SQ M
GLUCOSE P FAST SERPL-MCNC: 97 MG/DL (ref 65–99)
HCT VFR BLD AUTO: 41 % (ref 34.8–46.1)
HDLC SERPL-MCNC: 67 MG/DL
HGB BLD-MCNC: 13.6 G/DL (ref 11.5–15.4)
IMM GRANULOCYTES # BLD AUTO: 0.01 THOUSAND/UL (ref 0–0.2)
IMM GRANULOCYTES NFR BLD AUTO: 0 % (ref 0–2)
IRON SERPL-MCNC: 86 UG/DL (ref 50–170)
LDH SERPL-CCNC: 205 U/L (ref 81–234)
LDLC SERPL CALC-MCNC: 128 MG/DL (ref 0–100)
LYMPHOCYTES # BLD AUTO: 2.12 THOUSANDS/ΜL (ref 0.6–4.47)
LYMPHOCYTES NFR BLD AUTO: 38 % (ref 14–44)
MCH RBC QN AUTO: 31.8 PG (ref 26.8–34.3)
MCHC RBC AUTO-ENTMCNC: 33.2 G/DL (ref 31.4–37.4)
MCV RBC AUTO: 96 FL (ref 82–98)
MONOCYTES # BLD AUTO: 0.49 THOUSAND/ΜL (ref 0.17–1.22)
MONOCYTES NFR BLD AUTO: 9 % (ref 4–12)
NEUTROPHILS # BLD AUTO: 2.87 THOUSANDS/ΜL (ref 1.85–7.62)
NEUTS SEG NFR BLD AUTO: 50 % (ref 43–75)
NONHDLC SERPL-MCNC: 143 MG/DL
NRBC BLD AUTO-RTO: 0 /100 WBCS
PLATELET # BLD AUTO: 298 THOUSANDS/UL (ref 149–390)
PMV BLD AUTO: 10.1 FL (ref 8.9–12.7)
POTASSIUM SERPL-SCNC: 4.4 MMOL/L (ref 3.5–5.3)
PROT SERPL-MCNC: 7.8 G/DL (ref 6.4–8.2)
RBC # BLD AUTO: 4.28 MILLION/UL (ref 3.81–5.12)
SODIUM SERPL-SCNC: 138 MMOL/L (ref 136–145)
T3 SERPL-MCNC: 1.2 NG/ML (ref 0.6–1.8)
TRIGL SERPL-MCNC: 75 MG/DL
TSH SERPL DL<=0.05 MIU/L-ACNC: 0.9 UIU/ML (ref 0.36–3.74)
VIT B12 SERPL-MCNC: 763 PG/ML (ref 100–900)
WBC # BLD AUTO: 5.65 THOUSAND/UL (ref 4.31–10.16)

## 2020-09-02 PROCEDURE — 85652 RBC SED RATE AUTOMATED: CPT

## 2020-09-02 PROCEDURE — 36415 COLL VENOUS BLD VENIPUNCTURE: CPT

## 2020-09-02 PROCEDURE — 80061 LIPID PANEL: CPT

## 2020-09-02 PROCEDURE — 84443 ASSAY THYROID STIM HORMONE: CPT

## 2020-09-02 PROCEDURE — 83540 ASSAY OF IRON: CPT

## 2020-09-02 PROCEDURE — 86140 C-REACTIVE PROTEIN: CPT

## 2020-09-02 PROCEDURE — 80053 COMPREHEN METABOLIC PANEL: CPT

## 2020-09-02 PROCEDURE — 85025 COMPLETE CBC W/AUTO DIFF WBC: CPT

## 2020-09-02 PROCEDURE — 82728 ASSAY OF FERRITIN: CPT

## 2020-09-02 PROCEDURE — 82607 VITAMIN B-12: CPT

## 2020-09-02 PROCEDURE — 83615 LACTATE (LD) (LDH) ENZYME: CPT

## 2020-09-02 PROCEDURE — 84480 ASSAY TRIIODOTHYRONINE (T3): CPT

## 2020-09-22 ENCOUNTER — HOSPITAL ENCOUNTER (OUTPATIENT)
Dept: RADIOLOGY | Facility: MEDICAL CENTER | Age: 70
Discharge: HOME | End: 2020-09-22
Payer: MEDICARE

## 2020-09-22 DIAGNOSIS — R79.9 ABNORMAL BLOOD CHEMISTRY: ICD-10-CM

## 2020-09-22 PROCEDURE — 76700 US EXAM ABDOM COMPLETE: CPT

## 2021-02-25 ENCOUNTER — LAB (OUTPATIENT)
Dept: LAB | Facility: MEDICAL CENTER | Age: 71
End: 2021-02-25
Payer: MEDICARE

## 2021-02-25 ENCOUNTER — TRANSCRIBE ORDERS (OUTPATIENT)
Dept: ADMINISTRATIVE | Facility: HOSPITAL | Age: 71
End: 2021-02-25

## 2021-02-25 DIAGNOSIS — R23.2 FLUSHING: Primary | ICD-10-CM

## 2021-02-25 DIAGNOSIS — R23.2 FLUSHING: ICD-10-CM

## 2021-02-25 LAB
ERYTHROCYTE [SEDIMENTATION RATE] IN BLOOD: 33 MM/HOUR (ref 0–29)
FERRITIN SERPL-MCNC: 304 NG/ML (ref 8–388)
FSH SERPL-ACNC: 37.1 MIU/ML
IRON SERPL-MCNC: 82 UG/DL (ref 50–170)
LDH SERPL-CCNC: 189 U/L (ref 81–234)
T3 SERPL-MCNC: 0.9 NG/ML (ref 0.6–1.8)
TSH SERPL DL<=0.05 MIU/L-ACNC: 0.49 UIU/ML (ref 0.36–3.74)
VIT B12 SERPL-MCNC: 825 PG/ML (ref 100–900)

## 2021-02-25 PROCEDURE — 82607 VITAMIN B-12: CPT | Performed by: INTERNAL MEDICINE

## 2021-02-25 PROCEDURE — 84443 ASSAY THYROID STIM HORMONE: CPT

## 2021-02-25 PROCEDURE — 85652 RBC SED RATE AUTOMATED: CPT | Performed by: INTERNAL MEDICINE

## 2021-02-25 PROCEDURE — 83001 ASSAY OF GONADOTROPIN (FSH): CPT

## 2021-02-25 PROCEDURE — 83615 LACTATE (LD) (LDH) ENZYME: CPT | Performed by: INTERNAL MEDICINE

## 2021-02-25 PROCEDURE — 82728 ASSAY OF FERRITIN: CPT | Performed by: INTERNAL MEDICINE

## 2021-02-25 PROCEDURE — 83540 ASSAY OF IRON: CPT | Performed by: INTERNAL MEDICINE

## 2021-02-25 PROCEDURE — 84480 ASSAY TRIIODOTHYRONINE (T3): CPT

## 2021-02-25 PROCEDURE — 36415 COLL VENOUS BLD VENIPUNCTURE: CPT | Performed by: INTERNAL MEDICINE

## 2021-03-01 ENCOUNTER — TRANSCRIBE ORDERS (OUTPATIENT)
Dept: ADMINISTRATIVE | Facility: HOSPITAL | Age: 71
End: 2021-03-01

## 2021-03-01 ENCOUNTER — LAB (OUTPATIENT)
Dept: LAB | Facility: MEDICAL CENTER | Age: 71
End: 2021-03-01
Payer: MEDICARE

## 2021-03-01 ENCOUNTER — APPOINTMENT (OUTPATIENT)
Dept: LAB | Facility: MEDICAL CENTER | Age: 71
End: 2021-03-01
Payer: MEDICARE

## 2021-03-01 DIAGNOSIS — R23.2 FLUSHING: Primary | ICD-10-CM

## 2021-03-01 DIAGNOSIS — R23.2 FLUSHING: ICD-10-CM

## 2021-03-01 LAB — CORTIS SERPL-MCNC: 12.3 UG/DL

## 2021-03-01 PROCEDURE — 83497 ASSAY OF 5-HIAA: CPT

## 2021-03-01 PROCEDURE — 82533 TOTAL CORTISOL: CPT

## 2021-03-01 PROCEDURE — 36415 COLL VENOUS BLD VENIPUNCTURE: CPT

## 2021-03-06 LAB
5OH-INDOLEACETATE 24H UR-MCNC: 1.3 MG/L
5OH-INDOLEACETATE 24H UR-MRATE: 4.6 MG/24 HR (ref 0–14.9)

## 2021-05-17 ENCOUNTER — TRANSCRIBE ORDERS (OUTPATIENT)
Dept: ADMINISTRATIVE | Facility: HOSPITAL | Age: 71
End: 2021-05-17

## 2021-05-17 ENCOUNTER — APPOINTMENT (OUTPATIENT)
Dept: LAB | Facility: MEDICAL CENTER | Age: 71
End: 2021-05-17
Payer: MEDICARE

## 2021-05-17 DIAGNOSIS — Z79.899 ENCOUNTER FOR LONG-TERM (CURRENT) USE OF OTHER MEDICATIONS: ICD-10-CM

## 2021-05-17 DIAGNOSIS — M35.9 DIFFUSE DISEASE OF CONNECTIVE TISSUE (HCC): Primary | ICD-10-CM

## 2021-05-17 DIAGNOSIS — M35.9 DIFFUSE DISEASE OF CONNECTIVE TISSUE (HCC): ICD-10-CM

## 2021-05-17 DIAGNOSIS — E55.9 VITAMIN D DEFICIENCY: ICD-10-CM

## 2021-05-17 LAB
25(OH)D3 SERPL-MCNC: 46.6 NG/ML (ref 30–100)
ALBUMIN SERPL BCP-MCNC: 3.7 G/DL (ref 3.5–5)
ALP SERPL-CCNC: 94 U/L (ref 46–116)
ALT SERPL W P-5'-P-CCNC: 35 U/L (ref 12–78)
ANION GAP SERPL CALCULATED.3IONS-SCNC: 3 MMOL/L (ref 4–13)
AST SERPL W P-5'-P-CCNC: 26 U/L (ref 5–45)
BASOPHILS # BLD AUTO: 0.07 THOUSANDS/ΜL (ref 0–0.1)
BASOPHILS NFR BLD AUTO: 1 % (ref 0–1)
BILIRUB SERPL-MCNC: 0.95 MG/DL (ref 0.2–1)
BUN SERPL-MCNC: 28 MG/DL (ref 5–25)
C3 SERPL-MCNC: 111 MG/DL (ref 90–180)
CALCIUM SERPL-MCNC: 10.1 MG/DL (ref 8.3–10.1)
CHLORIDE SERPL-SCNC: 107 MMOL/L (ref 100–108)
CO2 SERPL-SCNC: 28 MMOL/L (ref 21–32)
CREAT SERPL-MCNC: 0.55 MG/DL (ref 0.6–1.3)
EOSINOPHIL # BLD AUTO: 0.08 THOUSAND/ΜL (ref 0–0.61)
EOSINOPHIL NFR BLD AUTO: 1 % (ref 0–6)
ERYTHROCYTE [DISTWIDTH] IN BLOOD BY AUTOMATED COUNT: 13.4 % (ref 11.6–15.1)
ERYTHROCYTE [SEDIMENTATION RATE] IN BLOOD: 25 MM/HOUR (ref 0–29)
GFR SERPL CREATININE-BSD FRML MDRD: 95 ML/MIN/1.73SQ M
GLUCOSE SERPL-MCNC: 86 MG/DL (ref 65–140)
HCT VFR BLD AUTO: 39.9 % (ref 34.8–46.1)
HGB BLD-MCNC: 13 G/DL (ref 11.5–15.4)
IMM GRANULOCYTES # BLD AUTO: 0.01 THOUSAND/UL (ref 0–0.2)
IMM GRANULOCYTES NFR BLD AUTO: 0 % (ref 0–2)
LYMPHOCYTES # BLD AUTO: 1.61 THOUSANDS/ΜL (ref 0.6–4.47)
LYMPHOCYTES NFR BLD AUTO: 29 % (ref 14–44)
MCH RBC QN AUTO: 31.9 PG (ref 26.8–34.3)
MCHC RBC AUTO-ENTMCNC: 32.6 G/DL (ref 31.4–37.4)
MCV RBC AUTO: 98 FL (ref 82–98)
MONOCYTES # BLD AUTO: 0.45 THOUSAND/ΜL (ref 0.17–1.22)
MONOCYTES NFR BLD AUTO: 8 % (ref 4–12)
NEUTROPHILS # BLD AUTO: 3.32 THOUSANDS/ΜL (ref 1.85–7.62)
NEUTS SEG NFR BLD AUTO: 61 % (ref 43–75)
NRBC BLD AUTO-RTO: 0 /100 WBCS
PLATELET # BLD AUTO: 287 THOUSANDS/UL (ref 149–390)
PMV BLD AUTO: 10.3 FL (ref 8.9–12.7)
POTASSIUM SERPL-SCNC: 4.1 MMOL/L (ref 3.5–5.3)
PROT SERPL-MCNC: 7.7 G/DL (ref 6.4–8.2)
RBC # BLD AUTO: 4.07 MILLION/UL (ref 3.81–5.12)
SODIUM SERPL-SCNC: 138 MMOL/L (ref 136–145)
WBC # BLD AUTO: 5.54 THOUSAND/UL (ref 4.31–10.16)

## 2021-05-17 PROCEDURE — 85025 COMPLETE CBC W/AUTO DIFF WBC: CPT | Performed by: PHYSICIAN ASSISTANT

## 2021-05-17 PROCEDURE — 36415 COLL VENOUS BLD VENIPUNCTURE: CPT | Performed by: PHYSICIAN ASSISTANT

## 2021-05-17 PROCEDURE — 86038 ANTINUCLEAR ANTIBODIES: CPT | Performed by: PHYSICIAN ASSISTANT

## 2021-05-17 PROCEDURE — 80053 COMPREHEN METABOLIC PANEL: CPT | Performed by: PHYSICIAN ASSISTANT

## 2021-05-17 PROCEDURE — 82306 VITAMIN D 25 HYDROXY: CPT | Performed by: PHYSICIAN ASSISTANT

## 2021-05-17 PROCEDURE — 85652 RBC SED RATE AUTOMATED: CPT

## 2021-05-17 PROCEDURE — 86160 COMPLEMENT ANTIGEN: CPT

## 2021-05-17 PROCEDURE — 86225 DNA ANTIBODY NATIVE: CPT

## 2021-05-18 LAB — DSDNA AB SER-ACNC: <1 IU/ML (ref 0–9)

## 2021-05-19 LAB — RYE IGE QN: NEGATIVE

## 2021-09-03 ENCOUNTER — APPOINTMENT (OUTPATIENT)
Dept: LAB | Facility: MEDICAL CENTER | Age: 71
End: 2021-09-03
Payer: MEDICARE

## 2021-09-03 DIAGNOSIS — I10 ESSENTIAL HYPERTENSION, BENIGN: ICD-10-CM

## 2021-09-03 DIAGNOSIS — E78.5 HYPERLIPIDEMIA, UNSPECIFIED HYPERLIPIDEMIA TYPE: ICD-10-CM

## 2021-09-03 LAB
ALBUMIN SERPL BCP-MCNC: 3.7 G/DL (ref 3.5–5)
ALP SERPL-CCNC: 90 U/L (ref 46–116)
ALT SERPL W P-5'-P-CCNC: 31 U/L (ref 12–78)
ANION GAP SERPL CALCULATED.3IONS-SCNC: 5 MMOL/L (ref 4–13)
AST SERPL W P-5'-P-CCNC: 26 U/L (ref 5–45)
BASOPHILS # BLD AUTO: 0.04 THOUSANDS/ΜL (ref 0–0.1)
BASOPHILS NFR BLD AUTO: 1 % (ref 0–1)
BILIRUB SERPL-MCNC: 1.71 MG/DL (ref 0.2–1)
BUN SERPL-MCNC: 26 MG/DL (ref 5–25)
CALCIUM SERPL-MCNC: 9.9 MG/DL (ref 8.3–10.1)
CHLORIDE SERPL-SCNC: 106 MMOL/L (ref 100–108)
CHOLEST SERPL-MCNC: 212 MG/DL (ref 50–200)
CO2 SERPL-SCNC: 27 MMOL/L (ref 21–32)
CREAT SERPL-MCNC: 0.67 MG/DL (ref 0.6–1.3)
EOSINOPHIL # BLD AUTO: 0.11 THOUSAND/ΜL (ref 0–0.61)
EOSINOPHIL NFR BLD AUTO: 2 % (ref 0–6)
ERYTHROCYTE [DISTWIDTH] IN BLOOD BY AUTOMATED COUNT: 13.5 % (ref 11.6–15.1)
GFR SERPL CREATININE-BSD FRML MDRD: 89 ML/MIN/1.73SQ M
GLUCOSE P FAST SERPL-MCNC: 85 MG/DL (ref 65–99)
HCT VFR BLD AUTO: 41.3 % (ref 34.8–46.1)
HDLC SERPL-MCNC: 65 MG/DL
HGB BLD-MCNC: 13.5 G/DL (ref 11.5–15.4)
IMM GRANULOCYTES # BLD AUTO: 0.01 THOUSAND/UL (ref 0–0.2)
IMM GRANULOCYTES NFR BLD AUTO: 0 % (ref 0–2)
LDLC SERPL CALC-MCNC: 131 MG/DL (ref 0–100)
LYMPHOCYTES # BLD AUTO: 1.8 THOUSANDS/ΜL (ref 0.6–4.47)
LYMPHOCYTES NFR BLD AUTO: 36 % (ref 14–44)
MCH RBC QN AUTO: 32.2 PG (ref 26.8–34.3)
MCHC RBC AUTO-ENTMCNC: 32.7 G/DL (ref 31.4–37.4)
MCV RBC AUTO: 99 FL (ref 82–98)
MONOCYTES # BLD AUTO: 0.57 THOUSAND/ΜL (ref 0.17–1.22)
MONOCYTES NFR BLD AUTO: 11 % (ref 4–12)
NEUTROPHILS # BLD AUTO: 2.46 THOUSANDS/ΜL (ref 1.85–7.62)
NEUTS SEG NFR BLD AUTO: 50 % (ref 43–75)
NONHDLC SERPL-MCNC: 147 MG/DL
NRBC BLD AUTO-RTO: 0 /100 WBCS
PLATELET # BLD AUTO: 300 THOUSANDS/UL (ref 149–390)
PMV BLD AUTO: 10 FL (ref 8.9–12.7)
POTASSIUM SERPL-SCNC: 4.3 MMOL/L (ref 3.5–5.3)
PROT SERPL-MCNC: 7.9 G/DL (ref 6.4–8.2)
RBC # BLD AUTO: 4.19 MILLION/UL (ref 3.81–5.12)
SODIUM SERPL-SCNC: 138 MMOL/L (ref 136–145)
TRIGL SERPL-MCNC: 82 MG/DL
TSH SERPL DL<=0.05 MIU/L-ACNC: 0.85 UIU/ML (ref 0.36–3.74)
WBC # BLD AUTO: 4.99 THOUSAND/UL (ref 4.31–10.16)

## 2021-09-03 PROCEDURE — 36415 COLL VENOUS BLD VENIPUNCTURE: CPT

## 2021-09-03 PROCEDURE — 84443 ASSAY THYROID STIM HORMONE: CPT

## 2021-09-03 PROCEDURE — 80053 COMPREHEN METABOLIC PANEL: CPT

## 2021-09-03 PROCEDURE — 80061 LIPID PANEL: CPT

## 2021-09-03 PROCEDURE — 84165 PROTEIN E-PHORESIS SERUM: CPT | Performed by: PATHOLOGY

## 2021-09-03 PROCEDURE — 85025 COMPLETE CBC W/AUTO DIFF WBC: CPT

## 2021-09-03 PROCEDURE — 84165 PROTEIN E-PHORESIS SERUM: CPT

## 2021-09-08 LAB
ALBUMIN SERPL ELPH-MCNC: 4.54 G/DL (ref 3.5–5)
ALBUMIN SERPL ELPH-MCNC: 59.7 % (ref 52–65)
ALPHA1 GLOB SERPL ELPH-MCNC: 0.23 G/DL (ref 0.1–0.4)
ALPHA1 GLOB SERPL ELPH-MCNC: 3 % (ref 2.5–5)
ALPHA2 GLOB SERPL ELPH-MCNC: 0.81 G/DL (ref 0.4–1.2)
ALPHA2 GLOB SERPL ELPH-MCNC: 10.7 % (ref 7–13)
BETA GLOB ABNORMAL SERPL ELPH-MCNC: 0.41 G/DL (ref 0.4–0.8)
BETA1 GLOB SERPL ELPH-MCNC: 5.4 % (ref 5–13)
BETA2 GLOB SERPL ELPH-MCNC: 6.5 % (ref 2–8)
BETA2+GAMMA GLOB SERPL ELPH-MCNC: 0.49 G/DL (ref 0.2–0.5)
GAMMA GLOB ABNORMAL SERPL ELPH-MCNC: 1.12 G/DL (ref 0.5–1.6)
GAMMA GLOB SERPL ELPH-MCNC: 14.7 % (ref 12–22)
IGG/ALB SER: 1.48 {RATIO} (ref 1.1–1.8)
PROT PATTERN SERPL ELPH-IMP: NORMAL
PROT SERPL-MCNC: 7.6 G/DL (ref 6.4–8.2)

## 2021-10-28 PROBLEM — K21.9 GERD (GASTROESOPHAGEAL REFLUX DISEASE): Status: ACTIVE | Noted: 2021-10-28

## 2021-10-28 PROBLEM — M35.9 UNDIFFERENTIATED CONNECTIVE TISSUE DISEASE (HCC): Status: ACTIVE | Noted: 2021-10-28

## 2021-10-28 PROBLEM — E03.9 HYPOTHYROIDISM: Status: ACTIVE | Noted: 2021-10-28

## 2021-10-28 PROBLEM — M15.0 PRIMARY GENERALIZED (OSTEO)ARTHRITIS: Status: ACTIVE | Noted: 2021-10-28

## 2021-10-28 PROBLEM — K58.9 IBS (IRRITABLE BOWEL SYNDROME): Status: ACTIVE | Noted: 2021-10-28

## 2021-10-28 PROBLEM — M79.7 FIBROMYALGIA: Status: ACTIVE | Noted: 2021-10-28

## 2022-03-16 ENCOUNTER — HOSPITAL ENCOUNTER (OUTPATIENT)
Dept: RADIOLOGY | Facility: MEDICAL CENTER | Age: 72
Discharge: HOME | End: 2022-03-16
Payer: MEDICARE

## 2022-03-16 VITALS — WEIGHT: 203 LBS | HEIGHT: 63 IN | BODY MASS INDEX: 35.97 KG/M2

## 2022-03-16 DIAGNOSIS — Z12.31 ENCOUNTER FOR SCREENING MAMMOGRAM FOR BREAST CANCER: ICD-10-CM

## 2022-03-16 PROCEDURE — 77067 SCR MAMMO BI INCL CAD: CPT

## 2022-03-16 PROCEDURE — 77063 BREAST TOMOSYNTHESIS BI: CPT

## 2022-04-08 ENCOUNTER — APPOINTMENT (OUTPATIENT)
Dept: LAB | Facility: MEDICAL CENTER | Age: 72
End: 2022-04-08
Payer: MEDICARE

## 2022-04-08 DIAGNOSIS — I10 ESSENTIAL HYPERTENSION, BENIGN: ICD-10-CM

## 2022-04-08 DIAGNOSIS — M79.7 FIBROMYALGIA: ICD-10-CM

## 2022-04-08 DIAGNOSIS — E03.9 ACQUIRED HYPOTHYROIDISM: ICD-10-CM

## 2022-04-08 DIAGNOSIS — M35.9 UNDIFFERENTIATED CONNECTIVE TISSUE DISEASE (HCC): ICD-10-CM

## 2022-04-08 DIAGNOSIS — E78.5 HYPERLIPIDEMIA, UNSPECIFIED HYPERLIPIDEMIA TYPE: ICD-10-CM

## 2022-04-08 DIAGNOSIS — Z79.899 ENCOUNTER FOR LONG-TERM (CURRENT) USE OF OTHER MEDICATIONS: ICD-10-CM

## 2022-04-08 LAB
ALBUMIN SERPL BCP-MCNC: 3.8 G/DL (ref 3.5–5)
ALP SERPL-CCNC: 76 U/L (ref 46–116)
ALT SERPL W P-5'-P-CCNC: 30 U/L (ref 12–78)
ANION GAP SERPL CALCULATED.3IONS-SCNC: 5 MMOL/L (ref 4–13)
AST SERPL W P-5'-P-CCNC: 25 U/L (ref 5–45)
BASOPHILS # BLD AUTO: 0.06 THOUSANDS/ΜL (ref 0–0.1)
BASOPHILS NFR BLD AUTO: 1 % (ref 0–1)
BILIRUB SERPL-MCNC: 1.72 MG/DL (ref 0.2–1)
BUN SERPL-MCNC: 30 MG/DL (ref 5–25)
CALCIUM SERPL-MCNC: 9.5 MG/DL (ref 8.3–10.1)
CHLORIDE SERPL-SCNC: 108 MMOL/L (ref 100–108)
CHOLEST SERPL-MCNC: 204 MG/DL
CO2 SERPL-SCNC: 26 MMOL/L (ref 21–32)
CREAT SERPL-MCNC: 0.68 MG/DL (ref 0.6–1.3)
EOSINOPHIL # BLD AUTO: 0.08 THOUSAND/ΜL (ref 0–0.61)
EOSINOPHIL NFR BLD AUTO: 2 % (ref 0–6)
ERYTHROCYTE [DISTWIDTH] IN BLOOD BY AUTOMATED COUNT: 13.8 % (ref 11.6–15.1)
ERYTHROCYTE [SEDIMENTATION RATE] IN BLOOD: 24 MM/HOUR (ref 0–29)
GFR SERPL CREATININE-BSD FRML MDRD: 88 ML/MIN/1.73SQ M
GLUCOSE P FAST SERPL-MCNC: 93 MG/DL (ref 65–99)
HCT VFR BLD AUTO: 38 % (ref 34.8–46.1)
HDLC SERPL-MCNC: 68 MG/DL
HGB BLD-MCNC: 12.4 G/DL (ref 11.5–15.4)
IMM GRANULOCYTES # BLD AUTO: 0.01 THOUSAND/UL (ref 0–0.2)
IMM GRANULOCYTES NFR BLD AUTO: 0 % (ref 0–2)
IRON SERPL-MCNC: 124 UG/DL (ref 50–170)
LDH SERPL-CCNC: 172 U/L (ref 81–234)
LDLC SERPL CALC-MCNC: 123 MG/DL (ref 0–100)
LYMPHOCYTES # BLD AUTO: 2.56 THOUSANDS/ΜL (ref 0.6–4.47)
LYMPHOCYTES NFR BLD AUTO: 47 % (ref 14–44)
MCH RBC QN AUTO: 31.9 PG (ref 26.8–34.3)
MCHC RBC AUTO-ENTMCNC: 32.6 G/DL (ref 31.4–37.4)
MCV RBC AUTO: 98 FL (ref 82–98)
MONOCYTES # BLD AUTO: 0.44 THOUSAND/ΜL (ref 0.17–1.22)
MONOCYTES NFR BLD AUTO: 8 % (ref 4–12)
NEUTROPHILS # BLD AUTO: 2.26 THOUSANDS/ΜL (ref 1.85–7.62)
NEUTS SEG NFR BLD AUTO: 42 % (ref 43–75)
NONHDLC SERPL-MCNC: 136 MG/DL
NRBC BLD AUTO-RTO: 0 /100 WBCS
PLATELET # BLD AUTO: 300 THOUSANDS/UL (ref 149–390)
PMV BLD AUTO: 10.4 FL (ref 8.9–12.7)
POTASSIUM SERPL-SCNC: 3.7 MMOL/L (ref 3.5–5.3)
PROT SERPL-MCNC: 7.2 G/DL (ref 6.4–8.2)
RBC # BLD AUTO: 3.89 MILLION/UL (ref 3.81–5.12)
SODIUM SERPL-SCNC: 139 MMOL/L (ref 136–145)
TRIGL SERPL-MCNC: 67 MG/DL
TSH SERPL DL<=0.05 MIU/L-ACNC: 1.16 UIU/ML (ref 0.45–4.5)
WBC # BLD AUTO: 5.41 THOUSAND/UL (ref 4.31–10.16)

## 2022-04-08 PROCEDURE — 83540 ASSAY OF IRON: CPT

## 2022-04-08 PROCEDURE — 80053 COMPREHEN METABOLIC PANEL: CPT

## 2022-04-08 PROCEDURE — 83615 LACTATE (LD) (LDH) ENZYME: CPT

## 2022-04-08 PROCEDURE — 84443 ASSAY THYROID STIM HORMONE: CPT

## 2022-04-08 PROCEDURE — 36415 COLL VENOUS BLD VENIPUNCTURE: CPT

## 2022-04-08 PROCEDURE — 80061 LIPID PANEL: CPT

## 2022-04-08 PROCEDURE — 85652 RBC SED RATE AUTOMATED: CPT

## 2022-04-08 PROCEDURE — 85025 COMPLETE CBC W/AUTO DIFF WBC: CPT

## 2022-04-26 ENCOUNTER — TELEPHONE (OUTPATIENT)
Dept: OBGYN CLINIC | Facility: HOSPITAL | Age: 72
End: 2022-04-26

## 2022-04-26 NOTE — TELEPHONE ENCOUNTER
Patient is calling to request to see Dr Ricardo Arauz for a left hip revision  She had surgery in 2019 with [facility]        She is going to obtain the records and have them sent to the office for the Dr's review

## 2022-07-12 ENCOUNTER — TELEPHONE (OUTPATIENT)
Dept: OBGYN CLINIC | Facility: MEDICAL CENTER | Age: 72
End: 2022-07-12

## 2022-07-12 ENCOUNTER — APPOINTMENT (OUTPATIENT)
Dept: RADIOLOGY | Facility: MEDICAL CENTER | Age: 72
End: 2022-07-12
Payer: MEDICARE

## 2022-07-12 DIAGNOSIS — M25.511 RIGHT SHOULDER PAIN, UNSPECIFIED CHRONICITY: ICD-10-CM

## 2022-07-12 DIAGNOSIS — M25.562 LEFT KNEE PAIN, UNSPECIFIED CHRONICITY: ICD-10-CM

## 2022-07-12 PROCEDURE — 73030 X-RAY EXAM OF SHOULDER: CPT

## 2022-07-12 PROCEDURE — 73562 X-RAY EXAM OF KNEE 3: CPT

## 2022-07-12 NOTE — TELEPHONE ENCOUNTER
Good Afternoon     Patient stopped in at the [place] office asking to make an appointment with you in [place]     Patient states she was told that she cannot be seen until her records are in the system her records are here from her previous RT hip surgery from [facility] in 2019      Can patient make an appointment to be seen?

## 2022-07-19 ENCOUNTER — HOSPITAL ENCOUNTER (OUTPATIENT)
Dept: RADIOLOGY | Facility: HOSPITAL | Age: 72
Discharge: HOME | End: 2022-07-19
Attending: ORTHOPAEDIC SURGERY
Payer: MEDICARE

## 2022-07-19 ENCOUNTER — OFFICE VISIT (OUTPATIENT)
Dept: OBGYN CLINIC | Facility: CLINIC | Age: 72
End: 2022-07-19
Payer: MEDICARE

## 2022-07-19 VITALS
SYSTOLIC BLOOD PRESSURE: 142 MMHG | WEIGHT: 208 LBS | BODY MASS INDEX: 36.86 KG/M2 | DIASTOLIC BLOOD PRESSURE: 86 MMHG | HEIGHT: 63 IN

## 2022-07-19 DIAGNOSIS — Z96.641 HISTORY OF TOTAL HIP ARTHROPLASTY, RIGHT: ICD-10-CM

## 2022-07-19 DIAGNOSIS — M25.551 PAIN IN RIGHT HIP: ICD-10-CM

## 2022-07-19 DIAGNOSIS — M46.1 SACROILIITIS (HCC): Primary | ICD-10-CM

## 2022-07-19 PROCEDURE — 73502 X-RAY EXAM HIP UNI 2-3 VIEWS: CPT

## 2022-07-19 PROCEDURE — 99213 OFFICE O/P EST LOW 20 MIN: CPT | Performed by: ORTHOPAEDIC SURGERY

## 2022-07-19 NOTE — PROGRESS NOTES
Assessment:  1  Pain in right hip  XR hip/pelv 2-3 vws right if performed   2  History of total hip arthroplasty, right     3  Sacroiliitis ([address] )  Ambulatory referral to Physical Therapy       Plan:     Patient has history right total knee arthroplasty and sacroiliitis    Weight-bearing as tolerated lower extremity    Physical therapy order was given out today for SI joint exercises, hamstring exercises, gait training and posterior hip precautions, use all modalities seen fit, 2 to 3 times a week for six- eight weeks   If pain persists, consider ordering right SI joint injection    Follow up in 3 months           The above stated was discussed in layman's terms and the patient expressed understanding  All questions were answered to the patient's satisfaction  Subjective:   Ethel Niño is a 70 y o  female who presents today consultation for right hip pain  She was referred by her PCP Dr Viv Adkins  She has history of a right posterior total hip arthroplasty performed on 02/18/2019 by Leeanne Freeman  at [facility] the history of a right total knee arthroplasty in 2013  Patient states she feels her gait is off when walking  She does have pain in the right buttock area and occasional radiating down to the posterior knee  She denies any groin pain  She does feel her right leg is longer  She did try wearing a shoe insert in the left shoe with no relief  She has been doing home exercises from physical therapy from 2019  She does have history of having degenerative disc disease and was treated Dr Inge Villarreal and had spinal injections        Review of systems negative unless otherwise specified in HPI    Past Medical History:   Diagnosis Date    Anxiety     Fibromyalgia     H/O cardiovascular stress test 2018    H/O echocardiogram 2017    HBP (high blood pressure)     Hypothyroidism     IBS (irritable bowel syndrome)     Vascular disease     Varicose veins       Past Surgical History: Procedure Laterality Date    CARDIAC CATHETERIZATION      EF 50%  No significant CAD       SECTION      CHOLECYSTECTOMY      HERNIA REPAIR  2010    REPLACEMENT TOTAL KNEE Right 2013    TOTAL HIP ARTHROPLASTY Right 2019    VEIN SURGERY Bilateral     2002 Dr Alyssa Martinez       Family History   Problem Relation Age of Onset    Heart failure Mother     Heart disease Mother     Lung cancer Father     Hypertension Sister     Breast cancer Sister         over 48    Hypertension Brother     Heart disease Brother     Cancer Brother     Prostate cancer Brother     Coronary artery disease Other     No Known Problems Daughter     No Known Problems Maternal Grandmother     No Known Problems Maternal Grandfather     No Known Problems Paternal Grandmother     No Known Problems Paternal Grandfather     No Known Problems Sister     No Known Problems Sister     No Known Problems Sister     No Known Problems Sister     No Known Problems Brother     No Known Problems Brother     No Known Problems Brother     No Known Problems Brother     No Known Problems Son     No Known Problems Son        Social History     Occupational History    Occupation: Retired   Tobacco Use    Smoking status: Former Smoker    Smokeless tobacco: Never Used    Tobacco comment: pt quit in her 35s   Substance and Sexual Activity    Alcohol use: No    Drug use: No    Sexual activity: Not on file         Current Outpatient Medications:     ALPRAZolam (XANAX) 0 5 mg tablet, Take 0 5 mg by mouth daily at bedtime as needed  , Disp: , Rfl: 5    dexlansoprazole (Dexilant) 60 MG capsule, Take 60 mg by mouth daily, Disp: , Rfl:     diclofenac (VOLTAREN) 75 mg EC tablet, , Disp: , Rfl:     esomeprazole (NexIUM) 20 mg capsule, Take 20 mg by mouth every morning before breakfast (Patient not taking: Reported on 2021), Disp: , Rfl:     latanoprost (XALATAN) 0 005 % ophthalmic solution, 1 drop daily at bedtime, Disp: , Rfl:     Linzess 72 MCG CAPS, 72 mcg daily in the early morning , Disp: , Rfl:     losartan (COZAAR) 100 MG tablet, Take 100 mg by mouth daily, Disp: , Rfl:     losartan-hydrochlorothiazide (HYZAAR) 100-25 MG per tablet, Take 1 tablet by mouth as needed  (Patient not taking: Reported on 11/1/2021), Disp: , Rfl:     SYNTHROID 112 MCG tablet, Take 100 mcg by mouth daily , Disp: , Rfl:     Allergies   Allergen Reactions    Escitalopram Diarrhea and GI Intolerance    Food Throat Swelling     FAT FREE SALAD DRESSING    Hydrocodone-Acetaminophen Other (See Comments)     Heat intolerance, flushing    Lactose Intolerance [Lactase] Abdominal Pain    Meloxicam Other (See Comments)     Other reaction(s): nose bleeds    Methylprednisolone Other (See Comments)     Red face    Niacin Facial Swelling     Other reaction(s): Unknown Allergic Reaction    Norco [Hydrocodone-Acetaminophen] Other (See Comments)     Heat intolerance, flushing    Gabapentin Anxiety            Vitals:    07/19/22 1113   BP: 142/86       Objective:            Physical Exam  Physical Exam:      General Appearance:    Alert, cooperative, no distress, appears stated age   Head:    Normocephalic, without obvious abnormality, atraumatic   Eyes:    conjunctiva/corneas clear, both eyes         Nose:   Nares normal, septum midline, no drainage    Throat:   Lips normal; teeth and gums normal   Neck:    symmetrical, trachea midline, ;     thyroid:  no enlargement/   Back:     Symmetric, no curvature, ROM normal   Lungs:   No audible wheezing or labored breathing   Chest Wall:    No tenderness or deformity    Heart:    Regular rate and rhythm                         Pulses:   2+ and symmetric all extremities   Skin:   Skin color, texture, turgor normal, no rashes or lesions   Neurologic:   normal strength, sensation and reflexes     throughout                       Ortho Exam  Right hip  Posterior lateral surgical scar well healed  No erythema   TTP SI joint   No TTP greater troch bursa   No pain with flexion or internal rotation  No pain with flexion or external rotation   Neurovascularly Intact Distally     Diagnostics, reviewed and taken today if performed as documented: The attending physician has personally reviewed the pertinent films in PACS and interpretation is as follows: x-ray right hip demonstrates s/p DL adequate alignment of implant, no sign of loosening , she is 6 mm long on the right side     MRI lumbar spine dates 2018 DDD L2-L3, L3-L4 DDD         Procedures, if performed today:    Procedures    None performed      Scribe Attestation    I,:  Hampton Schilder Kantzaridis am acting as a scribe while in the presence of the attending physician :       I,:  Nereida Zurita MD personally performed the services described in this documentation    as scribed in my presence :             Portions of the record may have been created with voice recognition software  Occasional wrong word or "sound a like" substitutions may have occurred due to the inherent limitations of voice recognition software  Read the chart carefully and recognize, using context, where substitutions have occurred

## 2022-08-16 ENCOUNTER — EVALUATION (OUTPATIENT)
Dept: PHYSICAL THERAPY | Facility: MEDICAL CENTER | Age: 72
End: 2022-08-16
Payer: MEDICARE

## 2022-08-16 DIAGNOSIS — M46.1 SACROILIITIS (HCC): ICD-10-CM

## 2022-08-16 PROCEDURE — 97161 PT EVAL LOW COMPLEX 20 MIN: CPT | Performed by: PHYSICAL THERAPIST

## 2022-08-16 PROCEDURE — 97110 THERAPEUTIC EXERCISES: CPT | Performed by: PHYSICAL THERAPIST

## 2022-08-16 NOTE — PROGRESS NOTES
PT Evaluation     Today's date: 2022  Patient name: Gage Wright  : 1950  MRN: [id number]  Referring provider: Hendrick Goltz, MD  Dx:   Encounter Diagnosis     ICD-10-CM    1  Sacroiliitis ([facility] [address] )  M46 1 Ambulatory referral to Physical Therapy       Start Time: 720  Stop Time: 805  Total time in clinic (min): 45 minutes    Assessment  Assessment details: Pt is a 67 y o female who presents with history of R hip replacement (posterior approach) history of R hip pain with radicular symptoms down R LE, increased L knee pain, decreased R hip ROM (hamstring length) and decreased functional ability as subjectively reported  These impairments limit the patient from participating in ambulation for exercise and at PLOF, decreased ability to complete stairs and decreased ability to participate in the community  Pt presents today with minimal complaints of R SIJ/hip pain, but more so L knee discomfort  Pt also reports leg length discrepancy which was observed today but not measured  Pt demonstrates HS length imbalance and educated about hamstring stretching and its effect on low back symptoms  Pt was also reminded of posterior hip precautions and cautioned to abide by these when performing HEP  I believe this patient is a good candidate for and will benefit from skilled physical therapy for R hip ROM exercises, LE strengthening exercises, gait and balance training and mechanics training to improve symptoms and assist the patient to improved QOL  Thank you for the opportunity to participate in [facility]'s care      Positive Prognostic Indicators: desire to improve    Negative Prognostic Indicators: co-morbidities   Impairments: abnormal gait, abnormal muscle tone, abnormal or restricted ROM, abnormal movement, activity intolerance, impaired balance, impaired physical strength, lacks appropriate home exercise program and pain with function    Symptom irritability: lowUnderstanding of Dx/Px/POC: good Prognosis: good    Goals  STGs: 4 weeks  1) Pt will have SPR decrease of 2 units at worst  2) pt will have improved R hamstring strength improvement to at least 4+/5 in order to improve gait mechanics  3) pt will have improved foto score of 10 points  4) pt will demonstrate ability to complete step ups on b/l LEs on a 6" step in order to demonstrate improvement toward stair completion     LTGs: 8 weeks  1) pt will be independent with HEP by D/C  2) pt will be independent with symptom management by D/C  3) pt will subjectively report improved ability to walk length of driveway >2 laps with no more than 2/10 pain in the R hip in order to improve hobby of walking for exercise by DC  4) pt will demonstrate ability to complete 12 steps reciprocally in order to demonstrate improved ability to access second floor by DC  Plan  Patient would benefit from: skilled physical therapy  Planned modality interventions: cryotherapy and thermotherapy: hydrocollator packs  Planned therapy interventions: joint mobilization, manual therapy, neuromuscular re-education, patient education, postural training, strengthening, stretching, therapeutic activities, therapeutic exercise, home exercise program, gait training and balance/weight bearing training  Frequency: 2x week  Duration in weeks: 12  Plan of Care beginning date: 8/16/2022  Plan of Care expiration date: 11/8/2022  Treatment plan discussed with: patient        Subjective Evaluation    History of Present Illness  Mechanism of injury: Subjective Comments: pt reports that she has a hard time walking and losses her balance  She completed all her PT exercises in her bed  She will walk to the end of her driveway and feel "she ran a marathon" pt reports that she has on and off sciatic low back pain which she is presently not experiencing  Pt denies clicking, popping catching or locking  Pt denies falls recently   Pt reports that prior to hip replacement she fell over a wash basket and was having hip pain  Pt was seen by spine and pain and by orthopedic with hip replacement completed  While surgery was underway, pt reports that pt had a torn muscle in her hip  Pt rpeorts that she uses elliptical but is limited by L knee  No pain in the R hip  Pain R hip   Rest: 0/10   Best: 0/10   Worst: 6/10    Pain L knee   Rest: 8/10   Best: 0/10   Worst: 8/10    Relieving Factors: self massage, doing PT exercises    Exacerbating Factors: completing stairs, going up stairs    Sleeping:  Pretty well  Is getting new mattress     Home Set-up: increased difficulty  No issues with chair transfer    ADLs: indpeendent    Work/Hobbies: enjoys walking, has not been able to due to L knee mainly  Pt enjoys taking care of house    Previous Treatment: previous PT    Goals:  Wants to be able to climb the steps and wants to improve walking  Objective     Palpation     Right   Tenderness of the gluteus benito and gluteus medius  Additional Palpation Details  B/l ASIS equal levels    Passive Range of Motion   Left Hip   Flexion: 100 degrees   Abduction: 45 degrees   Adduction: 0 degrees   External rotation (90/90): [facility]  Internal rotation (90/90): WFL    Right Hip   Flexion: 90 degrees   Abduction: 45 degrees   Adduction: 0 degrees     Additional Passive Range of Motion Details  L hamstring hip angle: 80*  R hamstring hip angle: 45*    Strength/Myotome Testing     Left Hip   Planes of Motion   Flexion: 4+  Abduction: 4+  Adduction: 4+    Right Hip   Planes of Motion   Flexion: 5  Abduction: 4+  Adduction: 4+    Left Knee   Flexion: 4-  Extension: 5    Right Knee   Flexion: 4  Extension: 5    Left Ankle/Foot   Dorsiflexion: 5  Plantar flexion: 5    Right Ankle/Foot   Dorsiflexion: 5  Plantar flexion: 5    Tests     Additional Tests Details  Long sit test showed shorter L LE in supine and sitting      Ambulation     Ambulation: Level Surfaces   Ambulation without assistive device: independent    Ambulation: Stairs   Ascend stairs: independent  Pattern: non-reciprocal  Railings: two rails  Descend stairs: independent  Pattern: non-reciprocal  Railings: two rails    Additional Stairs Ambulation Details  Leads with R LE ascending and L LE descending    Observational Gait   Gait: asymmetric   Decreased walking speed and stride length  Additional Observational Gait Details  Increased postural sway noted when ambulating  Functional Assessment      Squat    Pain  Precautions: R hip replacement (posterior 2019), IBS, GERD, HTN, obesity, undifferentiated connective tissue disease, fibromyalgia      Manuals 8/16                                                                Neuro Re-Ed             bridges educated            [email or web address]             Standing hip abd/ext             Prone hip ext             SL hip abd                                       Ther Ex             bike             Seated HSS 30"x3 R LE            Standing HS curls x10 ea              Side stepping             Step ups             LP                                       Ther Activity                                       Gait Training                                       Modalities

## 2022-08-23 ENCOUNTER — OFFICE VISIT (OUTPATIENT)
Dept: PHYSICAL THERAPY | Facility: MEDICAL CENTER | Age: 72
End: 2022-08-23
Payer: MEDICARE

## 2022-08-23 DIAGNOSIS — M46.1 SACROILIITIS (HCC): Primary | ICD-10-CM

## 2022-08-23 PROCEDURE — 97112 NEUROMUSCULAR REEDUCATION: CPT | Performed by: PHYSICAL THERAPIST

## 2022-08-23 PROCEDURE — 97110 THERAPEUTIC EXERCISES: CPT | Performed by: PHYSICAL THERAPIST

## 2022-08-23 NOTE — PROGRESS NOTES
Daily Note     Today's date: 2022  Patient name: Debbie Saravia  : 1950  MRN: [id number]  Referring provider: Skye Murray MD  Dx:   Encounter Diagnosis     ICD-10-CM    1  Sacroiliitis ([facility] [address] )  M46 1        Start Time: 845  Stop Time: 930  Total time in clinic (min): 45 minutes    Subjective: pt reports to clinic reporting that HSS improved symptoms  She is now completing this on both sides  Objective: See treatment diary below      Assessment: Tolerated treatment well  Pt completed all exercises well with no complaints of pain in the R hip but increased complaints of L knee discomfort  Pt attempted SL hip abd but was unable to lift leg more than ~1in from pillow  Pt brought in shoe with lift and was worn through the last 1/3 of session  Pt ambulated with SPC in clinic with improved mechanics and decreased lateral trunk flexion when ambulation  Pt was educated about gait mechanics and the importance of glute strength of gait mechanics and knee symptoms  Pt encouraged to continue HEP and asked to wear shoe with lift for next 2 days to assess symptom change  Patient would benefit from continued PT      Plan: Continue per plan of care  Precautions: R hip replacement (posterior 2019) PRECAUTIONS, IBS, GERD, HTN, obesity, undifferentiated connective tissue disease, fibromyalgia    Pt 1:1 from 900-594  Manuals                                                                Neuro Re-Ed             bridges educated x20           clamshells  supine btb x20 3"           Standing hip abd/ext  2x10 ea  b/l           Prone hip ext             Supine marching  x20           SL hip abd  attempted (w/ pillow) unable           Supine hip abd w/ band                                       Ther Ex             bike  5'           Seated HSS 30"x3 R LE 30"x3           Standing HS curls x10 ea  x20 ea             Side stepping  rtb 5 laps           Step ups             LP  #55 2x10 Standing marching w/ pause                          Ther Activity                                       Gait Training                                       Modalities

## 2022-08-25 ENCOUNTER — OFFICE VISIT (OUTPATIENT)
Dept: PHYSICAL THERAPY | Facility: MEDICAL CENTER | Age: 72
End: 2022-08-25
Payer: MEDICARE

## 2022-08-25 DIAGNOSIS — M46.1 SACROILIITIS (HCC): Primary | ICD-10-CM

## 2022-08-25 PROCEDURE — 97110 THERAPEUTIC EXERCISES: CPT | Performed by: PHYSICAL THERAPIST

## 2022-08-25 PROCEDURE — 97140 MANUAL THERAPY 1/> REGIONS: CPT | Performed by: PHYSICAL THERAPIST

## 2022-08-25 PROCEDURE — 97112 NEUROMUSCULAR REEDUCATION: CPT | Performed by: PHYSICAL THERAPIST

## 2022-08-25 NOTE — PROGRESS NOTES
Daily Note     Today's date: 2022  Patient name: Joyce Miguel  : 1950  MRN: [id number]  Referring provider: Marty Mccracken MD  Dx:   Encounter Diagnosis     ICD-10-CM    1  Sacroiliitis ([facility] [address] )  M46 1        Start Time: 715  Stop Time: 156  Total time in clinic (min): 40 minutes    Subjective: pt reports that her R hip is doing very well and has no complaints of pain  However her L knee has increased pain and stiffness upon arrival        Objective: See treatment diary below      Assessment: Tolerated treatment well  Treatment session adjusted secondary to increased L knee pain  PROM was completed to L knee with good tolerance  All table exercises were tolerated well with minimal complaints of L knee pain  Additional hip strengthening exercises were completed and tolerated well  Pt reports minimal change in symptoms or gait with shoes with heel lift  Pt was asked to continue use of heel lift to assess symptom change with chronic use  Patient would benefit from continued PT      Plan: Continue per plan of care  Precautions: R hip replacement (posterior 2019) PRECAUTIONS, IBS, GERD, HTN, obesity, undifferentiated connective tissue disease, fibromyalgia      Manuals  progress HEP         L knee PROM   RK                                                 Neuro Re-Ed             bridges educated x20 2x10          clamshells  supine btb x20 3" supine blk x20 5"          Standing hip abd/ext  2x10 ea  b/l           Prone hip ext             Supine marching  x20 x20          SL hip abd  attempted (w/ pillow) unable SL clamshell          Supine hip abd w/ band   rtb 2x10          LAQ             SAQ   x20 b/l          Ther Ex             bike  5' 5'          Seated HSS 30"x3 R LE 30"x3 30"x3          Standing HS curls x10 ea  x20 ea             Side stepping  rtb 5 laps           Step ups             LP  #55 2x10           Standing marching w/ pause             Standing HR   x20 Ther Activity                                       Gait Training                                       Modalities

## 2022-08-29 ENCOUNTER — HOSPITAL ENCOUNTER (OUTPATIENT)
Dept: MRI IMAGING | Facility: HOSPITAL | Age: 72
Discharge: HOME | End: 2022-08-29
Payer: MEDICARE

## 2022-08-29 DIAGNOSIS — M25.511 RIGHT SHOULDER PAIN, UNSPECIFIED CHRONICITY: ICD-10-CM

## 2022-08-29 PROCEDURE — 73221 MRI JOINT UPR EXTREM W/O DYE: CPT

## 2022-08-29 PROCEDURE — G1004 CDSM NDSC: HCPCS

## 2022-08-30 ENCOUNTER — OFFICE VISIT (OUTPATIENT)
Dept: PHYSICAL THERAPY | Facility: MEDICAL CENTER | Age: 72
End: 2022-08-30
Payer: MEDICARE

## 2022-08-30 DIAGNOSIS — M46.1 SACROILIITIS (HCC): Primary | ICD-10-CM

## 2022-08-30 PROCEDURE — 97112 NEUROMUSCULAR REEDUCATION: CPT | Performed by: PHYSICAL THERAPIST

## 2022-08-30 PROCEDURE — 97110 THERAPEUTIC EXERCISES: CPT | Performed by: PHYSICAL THERAPIST

## 2022-08-30 PROCEDURE — 97140 MANUAL THERAPY 1/> REGIONS: CPT | Performed by: PHYSICAL THERAPIST

## 2022-08-30 NOTE — PROGRESS NOTES
Daily Note     Today's date: 2022  Patient name: Domi Doing  : 1950  MRN: [id number]  Referring provider: Zelda Combs MD  Dx:   Encounter Diagnosis     ICD-10-CM    1  Sacroiliitis ([address] )  M46 1        Start Time: 0800  Stop Time: 2578  Total time in clinic (min): 42 minutes    Subjective: pt reports that she has increased shoulder discomfort  Pt had MRI completed with report of torn RTC  Pt reports no complaints in the hip but continued (but improved) symptoms in L knee  Objective: See treatment diary below      Assessment: Tolerated treatment well  Pt completed all exercises well with minimal complaints of L knee pain  Exercises continued to be adjusted as to not exacerbate L LE symptoms and tolerated well  Pt was encouraged to continue HEP and given adjustments to make in order to increase intensity of exercises  We will continue to progress as tolerated  Patient would benefit from continued PT      Plan: Continue per plan of care  Precautions: R hip replacement (posterior 2019) PRECAUTIONS, IBS, GERD, HTN, obesity, undifferentiated connective tissue disease, fibromyalgia      Manuals 30         L knee PROM   RK RK                                                Neuro Re-Ed             bridges educated x20 2x10 2x10 gtb         clamshells  supine btb x20 3" supine blk x20 5" supine blk x20 5"         Standing hip abd/ext  2x10 ea  b/l           Prone hip ext             Supine marching  x20 x20 SLR x20 #2 ea  SL hip abd  attempted (w/ pillow) unable SL clamshell np         Supine hip abd w/ band   rtb 2x10          LAQ             SAQ   x20 b/l x20 b/l         Ther Ex             bike  5' 5' 5'         Seated HSS 30"x3 R LE 30"x3 30"x3 hep         Standing HS curls x10 ea  x20 ea  x20 ea   alt         Side stepping  rtb 5 laps  rtb 6 laps         Step ups             LP  #55 2x10           Standing marching w/ pause             Standing HR   x20 Lateral step ups    6" 2x10 R LE only                                   Ther Activity                                       Gait Training                                       Modalities

## 2022-09-02 ENCOUNTER — OFFICE VISIT (OUTPATIENT)
Dept: PHYSICAL THERAPY | Facility: MEDICAL CENTER | Age: 72
End: 2022-09-02
Payer: MEDICARE

## 2022-09-02 DIAGNOSIS — M46.1 SACROILIITIS (HCC): Primary | ICD-10-CM

## 2022-09-02 PROCEDURE — 97140 MANUAL THERAPY 1/> REGIONS: CPT | Performed by: PHYSICAL THERAPIST

## 2022-09-02 NOTE — PROGRESS NOTES
Daily Note     Today's date: 2022  Patient name: Shameka Marte  : 1950  MRN: [id number]  Referring provider: Cher Luciano MD  Dx:   Encounter Diagnosis     ICD-10-CM    1  Sacroiliitis ([facility] [address] )  M46 1        Start Time: 0800  Stop Time: 0840  Total time in clinic (min): 40 minutes    Subjective: pt reports that band that was given to her LV was too tight and caused increased L knee discomfort  Pt also notes increased R hip discomfort with supine hip abd with band but this has resolved  Objective: See treatment diary below      Assessment: Tolerated treatment well  PROM of the L knee was completed again this session secondary to increased complaints of discomfort  Pt noted improved symptoms following  Decrease intensity of exercises was completed this session with good tolerance  New band was provided today  Pt notes improved symptoms overall following treatment session and reports "I always feel better after therapy"  We will continue to progress as tolerated  Patient would benefit from continued PT      Plan: Continue per plan of care  Precautions: R hip replacement (posterior 2019) PRECAUTIONS, IBS, GERD, HTN, obesity, undifferentiated connective tissue disease, fibromyalgia    Pt 1:1 from 800-810 and [phone]  Manuals         L knee PROM   RK RK RK                                               Neuro Re-Ed             bridges educated x20 2x10 2x10 gtb 2x10        clamshells  supine btb x20 3" supine blk x20 5" supine blk x20 5" supine gtb x20 5" unilateral        Standing hip abd/ext  2x10 ea  b/l   2x10 ea  b/l        Prone hip ext             Supine marching  x20 x20 SLR x20 #2 ea  SLR x20 ea  SL hip abd  attempted (w/ pillow) unable SL clamshell np         Supine hip abd w/ band   rtb 2x10          LAQ             SAQ   x20 b/l x20 b/l x20 b/l        Ther Ex             bike  5' 5' 5' 5'        Seated HSS 30"x3 R LE 30"x3 30"x3 hep 30"x3 ea  Standing HS curls x10 ea  x20 ea  x20 ea  alt x20 ea  alt        Side stepping  rtb 5 laps  rtb 6 laps rtb 6 laps        Step ups     6" 2x10 R LE only        LP  #55 2x10           Standing marching w/ pause     x20 ea          Standing HR   x20          Lateral step ups    6" 2x10 R LE only 6" 2x10 R LE only                                  Ther Activity                                       Gait Training                                       Modalities

## 2022-09-06 ENCOUNTER — OFFICE VISIT (OUTPATIENT)
Dept: PHYSICAL THERAPY | Facility: MEDICAL CENTER | Age: 72
End: 2022-09-06
Payer: MEDICARE

## 2022-09-06 DIAGNOSIS — M46.1 SACROILIITIS (HCC): Primary | ICD-10-CM

## 2022-09-06 PROCEDURE — 97140 MANUAL THERAPY 1/> REGIONS: CPT | Performed by: PHYSICAL THERAPIST

## 2022-09-06 PROCEDURE — 97110 THERAPEUTIC EXERCISES: CPT | Performed by: PHYSICAL THERAPIST

## 2022-09-06 PROCEDURE — 97112 NEUROMUSCULAR REEDUCATION: CPT | Performed by: PHYSICAL THERAPIST

## 2022-09-06 NOTE — PROGRESS NOTES
Daily Note     Today's date: 2022  Patient name: Roby Carrillo  : 1950  MRN: [id number]  Referring provider: Amaury Yancey MD  Dx:   Encounter Diagnosis     ICD-10-CM    1  Sacroiliitis ([facility] [address] )  M46 1        Start Time: 930  Stop Time: 101  Total time in clinic (min): 41 minutes    Subjective: pt reports that her R hip is completely fine  She notes that her L knee was doing good, but she moved the wrong way last night causing increased L knee pain  She reports that her only complaints are with her L knee  She reports that she will be going on vacation for 2 weeks following today's appointment  Pt reports that she would like to hold off on PT and continue HEP independently for the time being  Objective: See treatment diary below    R hip PROM: (posterior hip precautions)  90* flexion  40* abd     LE strength: (L/R) measure out of 5    Hip:  Flexion  5/5  Abduction 5/4+  Adduction 5/5    Knee:  Flexion:  5/5  Extension:  5/5    Ankle  DF  5/5  PF  5/5    Assessment: Tolerated treatment well  Pt completed all exercises well with only complaints being in the L knee  Treatment session was adjusted as to not exacerbate these symptoms  Pt demonstrates good form with HEP and feels confident in independent management of R hip symptoms  Pt reports that she notes no limitations in functional ability due to R hip  Pt continues to ambulate with asymmetrical gait pattern, however this ay be due to L knee symptoms as well  Pt has been wearing shoes with L sided lift with no complaints  Pt reports that she will be following up with MD about L knee symptoms in the future  Pt was educated that her chart will remain open for 2 additional weeks incase of adverse symptoms with independent management  If there is no contact from patient in this time, pt will be DC from PT  Patient would benefit from continued PT      Plan: Continue per plan of care        Precautions: R hip replacement (posterior 2019) PRECAUTIONS, IBS, GERD, HTN, obesity, undifferentiated connective tissue disease, fibromyalgia    Pt 1:1 from   Manuals 8/16 8/23 8/25 5/30 9/2 9/6       L knee PROM   RK RK RK RK + STM                                              Neuro Re-Ed             bridges educated x20 2x10 2x10 gtb 2x10 2x10       clamshells  supine btb x20 3" supine blk x20 5" supine blk x20 5" supine gtb x20 5" unilateral supine gtb x20 5" unilateral       Standing hip abd/ext  2x10 ea  b/l   2x10 ea  b/l 2x10 ea  b/l       Prone hip ext             Supine marching  x20 x20 SLR x20 #2 ea  SLR x20 ea  SLR x20 ea  SL hip abd  attempted (w/ pillow) unable SL clamshell np         Supine hip abd w/ band   rtb 2x10          LAQ             SAQ   x20 b/l x20 b/l x20 b/l x20 b/l       Ther Ex             bike  5' 5' 5' 5'        Seated HSS 30"x3 R LE 30"x3 30"x3 hep 30"x3 ea  Standing HS curls x10 ea  x20 ea  x20 ea  alt x20 ea  alt x20 ea  alt       Side stepping  rtb 5 laps  rtb 6 laps rtb 6 laps        Step ups     6" 2x10 R LE only 6" 2x10 R LE only       LP  #55 2x10           Standing marching w/ pause     x20 ea  x20 ea         Standing HR   x20          Lateral step ups    6" 2x10 R LE only 6" 2x10 R LE only 6" 2x10 R LE only                                 Ther Activity                                       Gait Training                                       Modalities

## 2022-10-04 ENCOUNTER — ANNUAL EXAM (OUTPATIENT)
Dept: OBGYN CLINIC | Facility: MEDICAL CENTER | Age: 72
End: 2022-10-04
Payer: MEDICARE

## 2022-10-04 VITALS
DIASTOLIC BLOOD PRESSURE: 100 MMHG | WEIGHT: 208 LBS | BODY MASS INDEX: 36.86 KG/M2 | SYSTOLIC BLOOD PRESSURE: 140 MMHG | HEIGHT: 63 IN

## 2022-10-04 DIAGNOSIS — Z11.51 SCREENING FOR HPV (HUMAN PAPILLOMAVIRUS): ICD-10-CM

## 2022-10-04 DIAGNOSIS — Z01.419 ENCOUNTER FOR ANNUAL ROUTINE GYNECOLOGICAL EXAMINATION: Primary | ICD-10-CM

## 2022-10-04 DIAGNOSIS — Z12.31 ENCOUNTER FOR SCREENING MAMMOGRAM FOR MALIGNANT NEOPLASM OF BREAST: ICD-10-CM

## 2022-10-04 DIAGNOSIS — Z13.820 ENCOUNTER FOR SCREENING FOR OSTEOPOROSIS: ICD-10-CM

## 2022-10-04 DIAGNOSIS — Z12.11 ENCOUNTER FOR SCREENING FOR MALIGNANT NEOPLASM OF COLON: ICD-10-CM

## 2022-10-04 PROCEDURE — G0145 SCR C/V CYTO,THINLAYER,RESCR: HCPCS | Performed by: STUDENT IN AN ORGANIZED HEALTH CARE EDUCATION/TRAINING PROGRAM

## 2022-10-04 PROCEDURE — G0101 CA SCREEN;PELVIC/BREAST EXAM: HCPCS | Performed by: STUDENT IN AN ORGANIZED HEALTH CARE EDUCATION/TRAINING PROGRAM

## 2022-10-04 PROCEDURE — G0476 HPV COMBO ASSAY CA SCREEN: HCPCS | Performed by: STUDENT IN AN ORGANIZED HEALTH CARE EDUCATION/TRAINING PROGRAM

## 2022-10-04 RX ORDER — FAMOTIDINE 20 MG/1
TABLET, FILM COATED ORAL
COMMUNITY
Start: 2022-08-16

## 2022-10-04 RX ORDER — LEVOTHYROXINE SODIUM 100 MCG
TABLET ORAL
COMMUNITY
Start: 2022-09-01

## 2022-10-04 NOTE — PROGRESS NOTES
Assessment/Plan:    68 yo  - annual exam         Problem List Items Addressed This Visit    None     Visit Diagnoses     Encounter for annual routine gynecological examination    -  Primary  Last pap today if normal  Cystocele: discussed pessary vs  Surgery  Would like to try pessary first  Return for fitting  Encounter for screening for malignant neoplasm of colon      Has GI referral for hemorrhoids  Up to date on colonoscopy  Encounter for screening mammogram for malignant neoplasm of breast        Relevant Orders    Mammo screening bilateral w 3d & cad    Encounter for screening for osteoporosis        Relevant Orders    DXA bone density spine hip and pelvis          Subjective:      Patient ID: Mali Villanueva is a 67 y o  female  This is a 67 y o  postmenopausal  who presents for annual exam      Concerns: prolapse - feels she needs to push bladder back in, pushes in with urinating, worse at the end of the day    She denies vaginal bleeding, discharge, or pelvic pain  Sexually active: no,  with prostate cancer  STD testing: NA  Urinary concerns: hesitancy with urinating   Bowel movements: no changes, hemorrhoids - has referral from PCP to see GI    Screening:  Last pap smear: no record; no h/o abnormal  Last mammogram: 3/16/22-normal  Colon Cancer screenin-normal, repeat in 10 years per patient  DEXA: osteopenia? No record     Family history:  Breast cancer: sister in 76s  Colon cancer: no  Ovarian cancer: no    Body mass index is 37 44 kg/m²  Exercise: every day  Diet: tries to eat healthy  Smoking:  no        The following portions of the patient's history were reviewed and updated as appropriate: allergies, current medications, past family history, past medical history, past social history, past surgical history and problem list     Review of Systems   Constitutional: Negative  HENT: Negative  Eyes: Negative  Respiratory: Negative      Cardiovascular: Negative  Gastrointestinal: Negative  Endocrine: Negative  Genitourinary: Negative for dyspareunia, dysuria, frequency, menstrual problem, pelvic pain, vaginal discharge and vaginal pain  Musculoskeletal: Negative  Skin: Negative  Allergic/Immunologic: Negative  Neurological: Negative  Hematological: Negative  Psychiatric/Behavioral: Negative  Objective:      /100 (BP Location: Right arm, Patient Position: Sitting, Cuff Size: Adult)   Ht 5' 2 5" (1 588 m)   Wt 94 3 kg (208 lb)   BMI 37 44 kg/m²          Physical Exam  Vitals reviewed  Cardiovascular:      Rate and Rhythm: Normal rate  Pulmonary:      Effort: Pulmonary effort is normal    Chest:   Breasts: Breasts are symmetrical       Right: No mass, nipple discharge, skin change or tenderness  Left: No mass, nipple discharge, skin change or tenderness  Abdominal:      Palpations: Abdomen is soft  Genitourinary:     Labia:         Right: No rash  Left: No rash  Vagina: Normal  No signs of injury  Cervix: No cervical motion tenderness, discharge, friability or lesion  Uterus: Not deviated, not enlarged, not fixed and not tender  Adnexa:         Right: No mass, tenderness or fullness  Left: No mass, tenderness or fullness  Comments: Stage 3 cystocele with valsalva  Musculoskeletal:      Cervical back: Normal range of motion  Skin:     General: Skin is warm and dry  Neurological:      Mental Status: She is alert and oriented to person, place, and time     Psychiatric:         Behavior: Behavior normal

## 2022-10-04 NOTE — PROGRESS NOTES
Patient presents for a routine annual visit  Postmenopausal    Last Pap Smear: no history of abnormal paps  Mammogram: 3/16/22-normal  Colonoscopy: 2016-normal, repeat in 10 years per patient  Dexa: Follows up with Dr Isabelle Patel for osteoporosis  Non smoker  Non drinker  Not currently sexually active  Sister with breast cancer  No family history of colon, cervical, ovarian, or uterine cancer  Concerns: feels as if her bladder is prolapsing when standing up

## 2022-10-05 LAB
HPV HR 12 DNA CVX QL NAA+PROBE: NEGATIVE
HPV16 DNA CVX QL NAA+PROBE: NEGATIVE
HPV18 DNA CVX QL NAA+PROBE: NEGATIVE

## 2022-10-12 ENCOUNTER — TELEPHONE (OUTPATIENT)
Dept: OBGYN CLINIC | Facility: CLINIC | Age: 72
End: 2022-10-12

## 2022-10-12 LAB
LAB AP GYN PRIMARY INTERPRETATION: NORMAL
Lab: NORMAL

## 2022-10-18 ENCOUNTER — OFFICE VISIT (OUTPATIENT)
Dept: OBGYN CLINIC | Facility: CLINIC | Age: 72
End: 2022-10-18
Payer: MEDICARE

## 2022-10-18 VITALS
HEART RATE: 100 BPM | WEIGHT: 208.8 LBS | BODY MASS INDEX: 38.42 KG/M2 | SYSTOLIC BLOOD PRESSURE: 128 MMHG | DIASTOLIC BLOOD PRESSURE: 78 MMHG | HEIGHT: 62 IN

## 2022-10-18 DIAGNOSIS — M21.70 LEG LENGTH DISCREPANCY: Primary | ICD-10-CM

## 2022-10-18 PROCEDURE — 99213 OFFICE O/P EST LOW 20 MIN: CPT | Performed by: ORTHOPAEDIC SURGERY

## 2022-10-18 NOTE — PROGRESS NOTES
Orthopaedic Surgery - Office Note  Iftikhar Hernandez (29 y o  female)   : 1950   MRN: [id number]  Encounter Date: 10/18/2022    Chief Complaint   Patient presents with   • Right Hip - Follow-up       Assessment / Plan  SP right hip DL with leg length discrepancy causing gait disturbance    · CT scanogram of the lower extremities to determine exact discrepancy to better advise regarding shoe lifts  · Activity as tolerated   · Continue HEP  · Will plan to see patient back following imaging   Return for Recheck following CT scan   History of Present Illness  Iftikhar Hernandez is a 67 y o  female who presents for follow up regarding right hip  Since her past visit she has been participating in therapy and completing a HEP which has helped relieve her previous pain symptoms however she still notes significant gait disturbance due to her leg length discrepancy  She has used shoe lifts in the past without adequate improvement in her gait  She feels this is causing added stress on her other joints and wishes to discuss surgical options for revision  Review of Systems  Pertinent items are noted in HPI  All other systems were reviewed and are negative  Physical Exam  /78   Pulse 100   Ht 5' 2" (1 575 m) Comment: verbal  Wt 94 7 kg (208 lb 12 8 oz)   BMI 38 19 kg/m²   Cons: Appears well  No apparent distress  Psych: Alert  Oriented x3  Mood and affect normal   Eyes: PERRLA, EOMI  Resp: Normal effort  No audible wheezing or stridor  CV: Palpable pulse  No discernable arrhythmia  No LE edema  Lymph:  No palpable cervical, axillary, or inguinal lymphadenopathy  Skin: Warm  No palpable masses  No visible lesions  Neuro: Normal muscle tone  Normal and symmetric DTR's  Right Hip Exam  Alignment / Posture:  Leg length discrepancy with right leg approximately 2cm longer than the left  Inspection:  No swelling  No edema  No erythema  Palpation:  No tenderness    ROM:  Normal hip ROM   Strength:  5/5 hip flexors and abductors  Stability:  No objective hip instability  (-) Logroll  Tests:  (-) Sary  (-) ELIZABETH  (-) MARILEE  Neurovascular:  Sensation intact in DP/SP/Jackson/Sa/T nerve distributions  Toes warm and perfused  Gait:  Antalgic  Studies Reviewed  I have personally reviewed pertinent films in PACS  XR of right hip - shows well appearing prosthesis without evidence of fracture or lucency    Procedures  No procedures today  Medical, Surgical, Family, and Social History  The patient's medical history, family history, and social history, were reviewed and updated as appropriate  Past Medical History:   Diagnosis Date   • Anxiety    • Fibromyalgia    • H/O cardiovascular stress test    • H/O echocardiogram    • HBP (high blood pressure)    • Hypothyroidism    • IBS (irritable bowel syndrome)    • Vascular disease     Varicose veins       Past Surgical History:   Procedure Laterality Date   • CARDIAC CATHETERIZATION      EF 50%  No significant CAD     •  SECTION     • CHOLECYSTECTOMY     • HERNIA REPAIR     • REPLACEMENT TOTAL KNEE Right    • TOTAL HIP ARTHROPLASTY Right    • VEIN SURGERY Bilateral      Dr Jeanette Martinez       Family History   Problem Relation Age of Onset   • Cancer Mother    • Heart failure Mother    • Heart disease Mother    • Lung cancer Father    • Hypertension Sister    • Breast cancer Sister         over 48   • No Known Problems Sister    • No Known Problems Sister    • No Known Problems Sister    • No Known Problems Sister    • Hypertension Brother    • Heart disease Brother    • Cancer Brother    • Prostate cancer Brother    • No Known Problems Brother    • No Known Problems Brother    • No Known Problems Brother    • No Known Problems Brother    • No Known Problems Daughter    • No Known Problems Son    • No Known Problems Son    • No Known Problems Maternal Grandmother    • No Known Problems Maternal Grandfather    • No Known Problems Paternal Grandmother    • No Known Problems Paternal Grandfather    • Coronary artery disease Other        Social History     Occupational History   • Occupation: Retired   Tobacco Use   • Smoking status: Former Smoker   • Smokeless tobacco: Never Used   • Tobacco comment: pt quit in her 35s   Vaping Use   • Vaping Use: Never used   Substance and Sexual Activity   • Alcohol use: No   • Drug use: No   • Sexual activity: Not Currently       Allergies   Allergen Reactions   • Escitalopram Diarrhea and GI Intolerance   • Food Throat Swelling     FAT FREE SALAD DRESSING   • Hydrocodone-Acetaminophen Other (See Comments)     Heat intolerance, flushing   • Lactose Intolerance [Lactase] Abdominal Pain   • Meloxicam Other (See Comments)     Other reaction(s): nose bleeds   • Methylprednisolone Other (See Comments)     Red face   • Niacin Facial Swelling     Other reaction(s): Unknown Allergic Reaction   • Norco [Hydrocodone-Acetaminophen] Other (See Comments)     Heat intolerance, flushing   • Gabapentin Anxiety         Current Outpatient Medications:   •  ALPRAZolam (XANAX) 0 5 mg tablet, Take 0 5 mg by mouth daily at bedtime as needed  , Disp: , Rfl: 5  •  dexlansoprazole (Dexilant) 60 MG capsule, Take 60 mg by mouth daily, Disp: , Rfl:   •  diclofenac (VOLTAREN) 75 mg EC tablet, , Disp: , Rfl:   •  esomeprazole (NexIUM) 20 mg capsule, Take 20 mg by mouth every morning before breakfast, Disp: , Rfl:   •  famotidine (PEPCID) 20 mg tablet, , Disp: , Rfl:   •  latanoprost (XALATAN) 0 005 % ophthalmic solution, 1 drop daily at bedtime, Disp: , Rfl:   •  Linzess 72 MCG CAPS, 72 mcg daily in the early morning , Disp: , Rfl:   •  losartan (COZAAR) 100 MG tablet, Take 100 mg by mouth daily, Disp: , Rfl:   •  Synthroid 100 MCG tablet, , Disp: , Rfl:   •  losartan-hydrochlorothiazide (HYZAAR) 100-25 MG per tablet, Take 1 tablet by mouth as needed  (Patient not taking: No sig reported), Disp: , Rfl:   •  SYNTHROID 112 MCG tablet, Take 100 mcg by mouth daily  (Patient not taking: No sig reported), Disp: , Rfl:       Juan C Lira MD

## 2022-10-21 ENCOUNTER — APPOINTMENT (OUTPATIENT)
Dept: LAB | Facility: MEDICAL CENTER | Age: 72
End: 2022-10-21
Payer: MEDICARE

## 2022-10-21 ENCOUNTER — OFFICE VISIT (OUTPATIENT)
Dept: OBGYN CLINIC | Facility: MEDICAL CENTER | Age: 72
End: 2022-10-21

## 2022-10-21 VITALS
HEIGHT: 62 IN | BODY MASS INDEX: 38.28 KG/M2 | SYSTOLIC BLOOD PRESSURE: 140 MMHG | WEIGHT: 208 LBS | DIASTOLIC BLOOD PRESSURE: 88 MMHG

## 2022-10-21 DIAGNOSIS — M35.9 UNDIFFERENTIATED CONNECTIVE TISSUE DISEASE (HCC): ICD-10-CM

## 2022-10-21 DIAGNOSIS — N81.4 CYSTOCELE WITH PROLAPSE: Primary | ICD-10-CM

## 2022-10-21 LAB
ALBUMIN SERPL BCP-MCNC: 3.9 G/DL (ref 3.5–5)
ALP SERPL-CCNC: 93 U/L (ref 46–116)
ALT SERPL W P-5'-P-CCNC: 30 U/L (ref 12–78)
ANION GAP SERPL CALCULATED.3IONS-SCNC: 4 MMOL/L (ref 4–13)
AST SERPL W P-5'-P-CCNC: 29 U/L (ref 5–45)
BASOPHILS # BLD AUTO: 0.06 THOUSANDS/ΜL (ref 0–0.1)
BASOPHILS NFR BLD AUTO: 1 % (ref 0–1)
BILIRUB SERPL-MCNC: 1.15 MG/DL (ref 0.2–1)
BUN SERPL-MCNC: 26 MG/DL (ref 5–25)
C3 SERPL-MCNC: 108 MG/DL (ref 90–180)
C4 SERPL-MCNC: 24 MG/DL (ref 10–40)
CALCIUM SERPL-MCNC: 10.2 MG/DL (ref 8.3–10.1)
CHLORIDE SERPL-SCNC: 106 MMOL/L (ref 96–108)
CO2 SERPL-SCNC: 29 MMOL/L (ref 21–32)
CREAT SERPL-MCNC: 0.81 MG/DL (ref 0.6–1.3)
CRP SERPL QL: <3 MG/L
EOSINOPHIL # BLD AUTO: 0.08 THOUSAND/ΜL (ref 0–0.61)
EOSINOPHIL NFR BLD AUTO: 1 % (ref 0–6)
ERYTHROCYTE [DISTWIDTH] IN BLOOD BY AUTOMATED COUNT: 13.6 % (ref 11.6–15.1)
GFR SERPL CREATININE-BSD FRML MDRD: 72 ML/MIN/1.73SQ M
GLUCOSE SERPL-MCNC: 113 MG/DL (ref 65–140)
HCT VFR BLD AUTO: 39.9 % (ref 34.8–46.1)
HGB BLD-MCNC: 13.2 G/DL (ref 11.5–15.4)
IMM GRANULOCYTES # BLD AUTO: 0.01 THOUSAND/UL (ref 0–0.2)
IMM GRANULOCYTES NFR BLD AUTO: 0 % (ref 0–2)
LYMPHOCYTES # BLD AUTO: 2.44 THOUSANDS/ΜL (ref 0.6–4.47)
LYMPHOCYTES NFR BLD AUTO: 39 % (ref 14–44)
MCH RBC QN AUTO: 32.2 PG (ref 26.8–34.3)
MCHC RBC AUTO-ENTMCNC: 33.1 G/DL (ref 31.4–37.4)
MCV RBC AUTO: 97 FL (ref 82–98)
MONOCYTES # BLD AUTO: 0.45 THOUSAND/ΜL (ref 0.17–1.22)
MONOCYTES NFR BLD AUTO: 7 % (ref 4–12)
NEUTROPHILS # BLD AUTO: 3.24 THOUSANDS/ΜL (ref 1.85–7.62)
NEUTS SEG NFR BLD AUTO: 52 % (ref 43–75)
NRBC BLD AUTO-RTO: 0 /100 WBCS
PLATELET # BLD AUTO: 302 THOUSANDS/UL (ref 149–390)
PMV BLD AUTO: 10.5 FL (ref 8.9–12.7)
POTASSIUM SERPL-SCNC: 4.2 MMOL/L (ref 3.5–5.3)
PROT SERPL-MCNC: 7.5 G/DL (ref 6.4–8.4)
RBC # BLD AUTO: 4.1 MILLION/UL (ref 3.81–5.12)
SODIUM SERPL-SCNC: 139 MMOL/L (ref 135–147)
WBC # BLD AUTO: 6.28 THOUSAND/UL (ref 4.31–10.16)

## 2022-10-21 PROCEDURE — 86235 NUCLEAR ANTIGEN ANTIBODY: CPT

## 2022-10-21 PROCEDURE — 86225 DNA ANTIBODY NATIVE: CPT

## 2022-10-21 PROCEDURE — 86038 ANTINUCLEAR ANTIBODIES: CPT

## 2022-10-21 NOTE — PROGRESS NOTES
Pessary    Date/Time: 10/21/2022 12:44 PM  Performed by: Brie Souza MD  Authorized by: Brie Souza MD   Universal Protocol:  Consent: Verbal consent obtained  Risks and benefits: risks, benefits and alternatives were discussed  Consent given by: patient  Patient understanding: patient states understanding of the procedure being performed  Patient identity confirmed: verbally with patient      Indication:     Indication for pessary: cystocele    Pre-procedure:     Pessary procedure type:  Fitting  Problems:     Pessary complications: none    Procedure:     Pessary type:  Ring w/ support    Pessary size:  3    Patient tolerance of procedure: Tolerated well, no immediate complications  Comments:     Procedure comments:  Pessary not in stock  Will order  Pt will return for insertion

## 2022-10-24 LAB
DSDNA AB SER-ACNC: 1 IU/ML (ref 0–9)
ENA RNP AB SER-ACNC: <0.2 AI (ref 0–0.9)
ENA SCL70 AB SER-ACNC: <0.2 AI (ref 0–0.9)
ENA SM AB SER-ACNC: <0.2 AI (ref 0–0.9)
ENA SS-A AB SER-ACNC: <0.2 AI (ref 0–0.9)
ENA SS-B AB SER-ACNC: <0.2 AI (ref 0–0.9)
RYE IGE QN: NEGATIVE

## 2022-10-25 ENCOUNTER — HOSPITAL ENCOUNTER (OUTPATIENT)
Dept: RADIOLOGY | Facility: MEDICAL CENTER | Age: 72
Discharge: HOME | End: 2022-10-25
Payer: MEDICARE

## 2022-10-25 ENCOUNTER — TELEPHONE (OUTPATIENT)
Dept: OBGYN CLINIC | Facility: CLINIC | Age: 72
End: 2022-10-25

## 2022-10-25 DIAGNOSIS — M21.70 LEG LENGTH DISCREPANCY: ICD-10-CM

## 2022-10-25 PROCEDURE — 77073 BONE LENGTH STUDIES: CPT

## 2022-10-25 NOTE — TELEPHONE ENCOUNTER
Roya Akbar took a size 3 pessary ring to [facility] yesterday 10/24/22 for this patient

## 2022-10-25 NOTE — TELEPHONE ENCOUNTER
----- Message from Lanre Trujillo sent at 10/21/2022 12:14 PM EDT -----  Regarding: pessary  Patient is in need of a size 3 pessary ring with support in [facility]  Thank you

## 2022-10-31 LAB — MISCELLANEOUS LAB TEST RESULT: NORMAL

## 2022-11-01 ENCOUNTER — OFFICE VISIT (OUTPATIENT)
Dept: OBGYN CLINIC | Facility: CLINIC | Age: 72
End: 2022-11-01

## 2022-11-01 VITALS — HEIGHT: 62 IN | BODY MASS INDEX: 38.2 KG/M2 | WEIGHT: 207.6 LBS

## 2022-11-01 DIAGNOSIS — M17.12 PRIMARY OSTEOARTHRITIS OF LEFT KNEE: Primary | ICD-10-CM

## 2022-11-01 DIAGNOSIS — M21.70 LEG LENGTH DISCREPANCY: ICD-10-CM

## 2022-11-01 RX ORDER — KETOROLAC TROMETHAMINE 30 MG/ML
30 INJECTION, SOLUTION INTRAMUSCULAR; INTRAVENOUS
Status: COMPLETED | OUTPATIENT
Start: 2022-11-01 | End: 2022-11-01

## 2022-11-01 RX ORDER — BUPIVACAINE HYDROCHLORIDE 2.5 MG/ML
2 INJECTION, SOLUTION INFILTRATION; PERINEURAL
Status: COMPLETED | OUTPATIENT
Start: 2022-11-01 | End: 2022-11-01

## 2022-11-01 RX ORDER — METHYLPREDNISOLONE ACETATE 40 MG/ML
2 INJECTION, SUSPENSION INTRA-ARTICULAR; INTRALESIONAL; INTRAMUSCULAR; SOFT TISSUE
Status: COMPLETED | OUTPATIENT
Start: 2022-11-01 | End: 2022-11-01

## 2022-11-01 RX ADMIN — KETOROLAC TROMETHAMINE 30 MG: 30 INJECTION, SOLUTION INTRAMUSCULAR; INTRAVENOUS at 15:02

## 2022-11-01 RX ADMIN — METHYLPREDNISOLONE ACETATE 2 ML: 40 INJECTION, SUSPENSION INTRA-ARTICULAR; INTRALESIONAL; INTRAMUSCULAR; SOFT TISSUE at 15:02

## 2022-11-01 RX ADMIN — BUPIVACAINE HYDROCHLORIDE 2 ML: 2.5 INJECTION, SOLUTION INFILTRATION; PERINEURAL at 15:02

## 2022-11-01 NOTE — PROGRESS NOTES
Orthopedics          Aaron Orta 67 y o  female MRN: [id number]      Chief Complaint:   left knee pain, leg length discrepancie    HPI:   67 y  o female complaining of left knee pain  Patient notes pain in her left knee  Patient has been diagnosed with left knee pain due to osteoarthritis in the past   Patient has had injections the past with significant pain relief however pain since returned  Patient is also presents today for follow-up regarding leg length discrepancy does have a history of a right total hip and right total knee in the past by another physician  Patient states she has not had any formal shoe lifts at this time  She states her left knee pain is aching nature worse weight-bearing mildly relieved with rest denies any changes numbness tingling instability clicking catching her left knee  Review Of Systems:   · Skin: Normal  · Neuro: See HPI  · Musculoskeletal: See HPI  · All other systems reviewed and are negative    Past Medical History:   Past Medical History:   Diagnosis Date   • Anxiety    • Fibromyalgia    • H/O cardiovascular stress test    • H/O echocardiogram    • HBP (high blood pressure)    • Hypothyroidism    • IBS (irritable bowel syndrome)    • Vascular disease     Varicose veins       Past Surgical History:   Past Surgical History:   Procedure Laterality Date   • CARDIAC CATHETERIZATION      EF 50%  No significant CAD     •  SECTION     • CHOLECYSTECTOMY     • HERNIA REPAIR     • REPLACEMENT TOTAL KNEE Right    • TOTAL HIP ARTHROPLASTY Right 2019   • VEIN SURGERY Bilateral      Dr Goldy Caceres       Family History:  Family history reviewed and non-contributory  Family History   Problem Relation Age of Onset   • Cancer Mother    • Heart failure Mother    • Heart disease Mother    • Lung cancer Father    • Hypertension Sister    • Breast cancer Sister         over 48   • No Known Problems Sister    • No Known Problems Sister    • No Known Problems Sister    • No Known Problems Sister    • Hypertension Brother    • Heart disease Brother    • Cancer Brother    • Prostate cancer Brother    • No Known Problems Brother    • No Known Problems Brother    • No Known Problems Brother    • No Known Problems Brother    • No Known Problems Daughter    • No Known Problems Son    • No Known Problems Son    • No Known Problems Maternal Grandmother    • No Known Problems Maternal Grandfather    • No Known Problems Paternal Grandmother    • No Known Problems Paternal Grandfather    • Coronary artery disease Other          Social History:  Social History     Socioeconomic History   • Marital status: /Civil Union     Spouse name: None   • Number of children: None   • Years of education: None   • Highest education level: None   Occupational History   • Occupation: Retired   Tobacco Use   • Smoking status: Former Smoker   • Smokeless tobacco: Never Used   • Tobacco comment: pt quit in her 35s   Vaping Use   • Vaping Use: Never used   Substance and Sexual Activity   • Alcohol use: No   • Drug use: No   • Sexual activity: Not Currently   Other Topics Concern   • None   Social History Narrative    Most recent tobacco use screening: 10-    Alcohol intake: None    Marital status:     Live alone or with others: with others    Occupation: retired     Social Determinants of Health     Financial Resource Strain: Not on file   Food Insecurity: Not on file   Transportation Needs: Not on file   Physical Activity: Not on file   Stress: Not on file   Social Connections: Not on file   Intimate Partner Violence: Not on file   Housing Stability: Not on file       Allergies:    Allergies   Allergen Reactions   • Escitalopram Diarrhea and GI Intolerance   • Food Throat Swelling     FAT FREE SALAD DRESSING   • Hydrocodone-Acetaminophen Other (See Comments)     Heat intolerance, flushing   • Lactose Intolerance [Tilactase] Abdominal Pain   • Meloxicam Other (See Comments) Other reaction(s): nose bleeds   • Methylprednisolone Other (See Comments)     Red face   • Niacin Facial Swelling     Other reaction(s): Unknown Allergic Reaction   • Norco [Hydrocodone-Acetaminophen] Other (See Comments)     Heat intolerance, flushing   • Gabapentin Anxiety       Labs:  0   Lab Value Date/Time    HCT 39 9 10/21/2022 1500    HCT 38 0 04/08/2022 0805    HCT 41 3 09/03/2021 0824    HCT 40 2 11/21/2015 0509    HCT 41 4 11/20/2015 1729    HGB 13 2 10/21/2022 1500    HGB 12 4 04/08/2022 0805    HGB 13 5 09/03/2021 0824    HGB 13 1 11/21/2015 0509    HGB 14 0 11/20/2015 1729    INR 1 09 11/20/2015 1729    WBC 6 28 10/21/2022 1500    WBC 5 41 04/08/2022 0805    WBC 4 99 09/03/2021 0824    WBC 5 44 11/21/2015 0509    WBC 6 43 11/20/2015 1729    ESR 24 04/08/2022 0805    CRP <3 0 10/21/2022 1500       Meds:    Current Outpatient Medications:   •  ALPRAZolam (XANAX) 0 5 mg tablet, Take 0 5 mg by mouth daily at bedtime as needed  , Disp: , Rfl: 5  •  dexlansoprazole (Dexilant) 60 MG capsule, Take 60 mg by mouth daily, Disp: , Rfl:   •  diclofenac (VOLTAREN) 75 mg EC tablet, , Disp: , Rfl:   •  esomeprazole (NexIUM) 20 mg capsule, Take 20 mg by mouth every morning before breakfast, Disp: , Rfl:   •  famotidine (PEPCID) 20 mg tablet, , Disp: , Rfl:   •  latanoprost (XALATAN) 0 005 % ophthalmic solution, 1 drop daily at bedtime, Disp: , Rfl:   •  Linzess 72 MCG CAPS, 72 mcg daily in the early morning , Disp: , Rfl:   •  losartan (COZAAR) 100 MG tablet, Take 100 mg by mouth daily, Disp: , Rfl:   •  Synthroid 100 MCG tablet, , Disp: , Rfl:   •  SYNTHROID 112 MCG tablet, Take 100 mcg by mouth daily, Disp: , Rfl:   •  losartan-hydrochlorothiazide (HYZAAR) 100-25 MG per tablet, Take 1 tablet by mouth as needed (Patient not taking: No sig reported), Disp: , Rfl:       Physical Exam:   There were no vitals filed for this visit      General Appearance:    Alert, cooperative, no distress, appears stated age   Head: Normocephalic, without obvious abnormality, atraumatic   Eyes:    conjunctiva/corneas clear, both eyes        Nose:   Nares normal, septum midline, no drainage    Throat:   Lips normal; teeth and gums normal   Neck:    symmetrical, trachea midline, ;     thyroid:  no enlargement/   Back:     Symmetric, no curvature, ROM normal   Lungs:   No audible wheezing or labored breathing   Chest Wall:    No tenderness or deformity    Heart:    Regular rate and rhythm               Pulses:   2+ and symmetric all extremities   Skin:   Skin color, texture, turgor normal, no rashes or lesions   Neurologic:   normal strength, sensation and reflexes     throughout       Musculoskeletal: left lower extremity  · On examination of the left knee there is no effusion, no erythema  Range of motion to full active extension and flexion to greater than 120°  Pain on palpation medial and lateral joint lines  There is crepitus with range of motion, no warmth to palpation, bony enlargement noted  No pain on palpation pes anserine bursa region or distal iliotibial band  Stable to varus and valgus stress without pain or gapping  Negative anterior and posterior drawer testing  Sensation intact distal pulses present  Radiology:   I personally reviewed the films    X-rays left knee shows significant medial joint space and osteophyte formation and deformity revealing severe osteoarthritis    Large joint arthrocentesis: L knee  Universal Protocol:  Consent given by: patient  Patient understanding: patient states understanding of the procedure being performed  Site marked: the operative site was marked  Patient identity confirmed: verbally with patient    Supporting Documentation  Indications: pain   Procedure Details  Location: knee - L knee  Needle size: 22 G  Approach: superior  Medications administered: 2 mL bupivacaine 0 25 %; 2 mL methylPREDNISolone acetate 40 mg/mL; 30 mg ketorolac 30 mg/mL    Patient tolerance: patient tolerated the procedure well with no immediate complications            _*_*_*_*_*_*_*_*_*_*_*_*_*_*_*_*_*_*_*_*_*_*_*_*_*_*_*_*_*_*_*_*_*_*_*_*_*_*_*_*_*    Assessment:  67 y  o female with left knee pain due to osteoarthritis    Plan:   · Weight bearing as tolerated  left lower extremity  · Left knee intra-articular corticosteroid and Toradol injection administered as noted above  · Patient advised should they develop any increasing pain, redness, drainage, numbness, tingling or swelling surrounding the injection sight, they are to contact our office or present to the emergency department    · 1/2 inch shoe lift for left lower extremity ordered  · Case discussed with Dr Lana Leiva  · Follow up in 3 months or sooner if needed      Isaak Costa PA-C

## 2022-11-04 ENCOUNTER — OFFICE VISIT (OUTPATIENT)
Dept: OBGYN CLINIC | Facility: MEDICAL CENTER | Age: 72
End: 2022-11-04

## 2022-11-04 ENCOUNTER — HOSPITAL ENCOUNTER (EMERGENCY)
Facility: HOSPITAL | Age: 72
Discharge: HOME | End: 2022-11-04
Attending: EMERGENCY MEDICINE

## 2022-11-04 VITALS
BODY MASS INDEX: 37.94 KG/M2 | DIASTOLIC BLOOD PRESSURE: 102 MMHG | HEIGHT: 62 IN | SYSTOLIC BLOOD PRESSURE: 146 MMHG | WEIGHT: 206.2 LBS

## 2022-11-04 VITALS
TEMPERATURE: 98.3 F | HEART RATE: 55 BPM | OXYGEN SATURATION: 98 % | RESPIRATION RATE: 17 BRPM | DIASTOLIC BLOOD PRESSURE: 66 MMHG | SYSTOLIC BLOOD PRESSURE: 149 MMHG

## 2022-11-04 DIAGNOSIS — N81.4 UTERINE PROLAPSE: Primary | ICD-10-CM

## 2022-11-04 DIAGNOSIS — G44.52 NEW DAILY PERSISTENT HEADACHE: ICD-10-CM

## 2022-11-04 DIAGNOSIS — R51.9 HEADACHE: Primary | ICD-10-CM

## 2022-11-04 DIAGNOSIS — I10 HIGH BLOOD PRESSURE: ICD-10-CM

## 2022-11-04 RX ORDER — DIPHENHYDRAMINE HYDROCHLORIDE 50 MG/ML
25 INJECTION INTRAMUSCULAR; INTRAVENOUS ONCE
Status: COMPLETED | OUTPATIENT
Start: 2022-11-04 | End: 2022-11-04

## 2022-11-04 RX ORDER — DEXAMETHASONE SODIUM PHOSPHATE 4 MG/ML
10 INJECTION, SOLUTION INTRA-ARTICULAR; INTRALESIONAL; INTRAMUSCULAR; INTRAVENOUS; SOFT TISSUE ONCE
Status: COMPLETED | OUTPATIENT
Start: 2022-11-04 | End: 2022-11-04

## 2022-11-04 RX ORDER — MAGNESIUM SULFATE HEPTAHYDRATE 40 MG/ML
2 INJECTION, SOLUTION INTRAVENOUS ONCE
Status: COMPLETED | OUTPATIENT
Start: 2022-11-04 | End: 2022-11-04

## 2022-11-04 RX ORDER — METOCLOPRAMIDE HYDROCHLORIDE 5 MG/ML
10 INJECTION INTRAMUSCULAR; INTRAVENOUS ONCE
Status: COMPLETED | OUTPATIENT
Start: 2022-11-04 | End: 2022-11-04

## 2022-11-04 RX ADMIN — DEXAMETHASONE SODIUM PHOSPHATE 10 MG: 4 INJECTION, SOLUTION INTRAMUSCULAR; INTRAVENOUS at 13:38

## 2022-11-04 RX ADMIN — MAGNESIUM SULFATE HEPTAHYDRATE 2 G: 40 INJECTION, SOLUTION INTRAVENOUS at 13:38

## 2022-11-04 RX ADMIN — METOCLOPRAMIDE 10 MG: 5 INJECTION, SOLUTION INTRAMUSCULAR; INTRAVENOUS at 13:38

## 2022-11-04 RX ADMIN — DIPHENHYDRAMINE HYDROCHLORIDE 25 MG: 50 INJECTION, SOLUTION INTRAMUSCULAR; INTRAVENOUS at 13:38

## 2022-11-04 NOTE — DISCHARGE INSTRUCTIONS
He was seen evaluated emergency department for elevated blood pressure with headache  You were given Decadron, Benadryl, magnesium, and Reglan  Which improved her headache  Please call your primary care doctor on Monday (11/07/2022) for after ER follow-up  Please return to the emergency department for any new or worsening symptoms, including but not limited to: Worsening headache, weakness to the arm or leg, speech changes, vision changes, chest pain, shortness of breath, abdominal pain, or any new or worsening symptoms

## 2022-11-04 NOTE — ED PROVIDER NOTES
History  Chief Complaint   Patient presents with   • Headache          • Hypertension     Pt was at a gynecologist appointment and started getting a bad headache  Pt's pressure at her appointment was 158/110  Denies dizziness and blurred vision  States she did take her blood pressure medication today  24-year-old female with a past medical history hypothyroidism, hypertension, IBS, anxiety who presents emergency department with elevated blood pressure and a headache  Patient was at her gynecologist's office today for routine visit that her blood pressure it was elevated to 158/110  Patient reports she has been having intermittent headaches to the top of her head for the last week  Patient states she normally does not get headaches  Patient describes the headache as a pressure on the top of her head, like something is sitting on top of her head  Pain instructed to come to the emergency department by her gynecologist   Patient states she did take her blood pressure medications this morning, losartan 100 mg  Patient also reports upper abdominal pain is chronic  Patient has a history of a cholecystectomy in the past   Patient denies fever, chills, chest pain, shortness of breath, nausea, vomiting, diarrhea, constipation, focal weakness, numbness/tingling, vision changes, dizziness  History provided by:  Patient  Hypertension  Severity:  Unable to specify  Onset quality: today  Chronicity: h/o HTN, did take BP medication today  Relieved by:   Angiotensin blockers  Worsened by:  Nothing  Ineffective treatments:  Angiotensin blockers  Associated symptoms: abdominal pain and headaches    Associated symptoms: no blurred vision, no chest pain, no dizziness, no ear pain, no fever, no hematuria, no nausea, no palpitations, no peripheral edema, no shortness of breath, not vomiting and no weakness    Headaches:     Severity:  Moderate    Duration:  1 week    Timing:  Intermittent  Risk factors: no cardiac disease and no diabetes        Prior to Admission Medications   Prescriptions Last Dose Informant Patient Reported? Taking? ALPRAZolam (XANAX) 0 5 mg tablet  Self Yes No   Sig: Take 0 5 mg by mouth daily at bedtime as needed     Linzess 72 MCG CAPS  Self Yes No   Si mcg daily in the early morning    SYNTHROID 112 MCG tablet   Yes No   Sig: Take 100 mcg by mouth daily   Synthroid 100 MCG tablet  Self Yes No   dexlansoprazole (Dexilant) 60 MG capsule  Self Yes No   Sig: Take 60 mg by mouth daily   diclofenac (VOLTAREN) 75 mg EC tablet  Self Yes No   esomeprazole (NexIUM) 20 mg capsule  Self Yes No   Sig: Take 20 mg by mouth every morning before breakfast   famotidine (PEPCID) 20 mg tablet  Self Yes No   latanoprost (XALATAN) 0 005 % ophthalmic solution  Self Yes No   Si drop daily at bedtime   losartan (COZAAR) 100 MG tablet  Self Yes No   Sig: Take 100 mg by mouth daily   losartan-hydrochlorothiazide (HYZAAR) 100-25 MG per tablet   Yes No   Sig: Take 1 tablet by mouth as needed   Patient not taking: No sig reported      Facility-Administered Medications: None       Past Medical History:   Diagnosis Date   • Anxiety    • Fibromyalgia    • H/O cardiovascular stress test    • H/O echocardiogram    • HBP (high blood pressure)    • Hypothyroidism    • IBS (irritable bowel syndrome)    • Vascular disease     Varicose veins       Past Surgical History:   Procedure Laterality Date   • CARDIAC CATHETERIZATION      EF 50%  No significant CAD     •  SECTION     • CHOLECYSTECTOMY     • HERNIA REPAIR     • REPLACEMENT TOTAL KNEE Right    • TOTAL HIP ARTHROPLASTY Right 2019   • VEIN SURGERY Bilateral      Dr Bruno Gamez       Family History   Problem Relation Age of Onset   • Cancer Mother    • Heart failure Mother    • Heart disease Mother    • Lung cancer Father    • Hypertension Sister    • Breast cancer Sister         over 48   • No Known Problems Sister    • No Known Problems Sister    • No Known Problems Sister    • Cancer Sister    • Hypertension Brother    • Heart disease Brother    • Cancer Brother    • Prostate cancer Brother    • No Known Problems Brother    • No Known Problems Brother    • No Known Problems Brother    • No Known Problems Brother    • No Known Problems Daughter    • No Known Problems Son    • No Known Problems Son    • No Known Problems Maternal Grandmother    • No Known Problems Maternal Grandfather    • No Known Problems Paternal Grandmother    • No Known Problems Paternal Grandfather    • Coronary artery disease Other      I have reviewed and agree with the history as documented  E-Cigarette/Vaping   • E-Cigarette Use Never User      E-Cigarette/Vaping Substances     Social History     Tobacco Use   • Smoking status: Former Smoker   • Smokeless tobacco: Never Used   • Tobacco comment: pt quit in her 35s   Vaping Use   • Vaping Use: Never used   Substance Use Topics   • Alcohol use: No   • Drug use: No        Review of Systems   Constitutional: Negative for chills and fever  +HTN   HENT: Negative for ear pain and sore throat  Eyes: Negative for blurred vision, pain and visual disturbance  Respiratory: Negative for cough and shortness of breath  Cardiovascular: Negative for chest pain, palpitations and leg swelling  Gastrointestinal: Positive for abdominal pain  Negative for constipation, diarrhea, nausea and vomiting  Genitourinary: Negative for dysuria and hematuria  Musculoskeletal: Negative for arthralgias and back pain  Skin: Negative for color change and rash  Neurological: Positive for headaches  Negative for dizziness, seizures, syncope, speech difficulty, weakness, light-headedness and numbness  All other systems reviewed and are negative        Physical Exam  ED Triage Vitals [11/04/22 1235]   Temperature Pulse Respirations Blood Pressure SpO2   98 3 °F (36 8 °C) 78 17 (!) 171/76 98 %      Temp src Heart Rate Source Patient Position - Orthostatic VS BP Location FiO2 (%)   -- -- -- -- --      Pain Score       5             Orthostatic Vital Signs  Vitals:    11/04/22 1235 11/04/22 1330 11/04/22 1400 11/04/22 1430   BP: (!) 171/76 (!) 173/74 162/72 149/66   Pulse: 78 56 62 55       Physical Exam  Vitals and nursing note reviewed  Constitutional:       General: She is not in acute distress  Appearance: She is well-developed  HENT:      Head: Normocephalic and atraumatic  Mouth/Throat:      Mouth: Mucous membranes are moist       Pharynx: Oropharynx is clear  Uvula midline  No pharyngeal swelling, oropharyngeal exudate, posterior oropharyngeal erythema or uvula swelling  Eyes:      General: Lids are normal       Extraocular Movements: Extraocular movements intact  Conjunctiva/sclera: Conjunctivae normal       Pupils: Pupils are equal, round, and reactive to light  Cardiovascular:      Rate and Rhythm: Normal rate and regular rhythm  Pulses: Normal pulses  Heart sounds: Murmur heard  Pulmonary:      Effort: Pulmonary effort is normal  No respiratory distress  Breath sounds: Normal breath sounds  Abdominal:      General: Bowel sounds are normal       Palpations: Abdomen is soft  Tenderness: There is abdominal tenderness in the epigastric area  Musculoskeletal:      Cervical back: Neck supple  Skin:     General: Skin is warm and dry  Neurological:      Mental Status: She is alert and oriented to person, place, and time  Cranial Nerves: Cranial nerves are intact  Sensory: Sensation is intact  Motor: Motor function is intact        Coordination: Heel to [facility] Test normal          ED Medications  Medications   diphenhydrAMINE (BENADRYL) injection 25 mg (25 mg Intravenous Given 11/4/22 1338)   metoclopramide (REGLAN) injection 10 mg (10 mg Intravenous Given 11/4/22 1338)   dexamethasone (DECADRON) injection 10 mg (10 mg Intravenous Given 11/4/22 1338)   magnesium sulfate 2 g/50 mL IVPB (premix) 2 g (0 g Intravenous Stopped 11/4/22 1444)       Diagnostic Studies  Results Reviewed     None                 No orders to display         Procedures  ECG 12 Lead Documentation Only    Date/Time: 11/4/2022 1:27 PM  Performed by: Leilani Benavidez DO  Authorized by: Leilani Benavidez DO     Patient location:  ED  Previous ECG:     Previous ECG:  Compared to current    Comparison ECG info:  3/19/20    Similarity:  Changes noted  Interpretation:     Interpretation: abnormal    Rate:     ECG rate:  80    ECG rate assessment: normal    Rhythm:     Rhythm: sinus rhythm    Ectopy:     Ectopy: PVCs      PVCs:  Infrequent  QRS:     QRS axis:  Normal    QRS intervals:  Normal  Conduction:     Conduction: abnormal      Abnormal conduction: complete LBBB    ST segments:     ST segments:  Normal  T waves:     T waves: normal    Comments:      LBBB is chronic          ED Course  ED Course as of 11/04/22 1715   Fri Nov 04, 2022   1240 Blood Pressure(!): 171/76   1240 Temperature: 98 3 °F (36 8 °C)   1240 Pulse: 78   1240 Respirations: 17   1240 SpO2: 98 %   1240 68 yo F presenting with elevated blood pressure and headache  One week of intermittent headaches  Did take blood pressure medications today  Chronic abdominal pain  No focal weakness no focal numbness no vision changes or dizziness  No chest pain, no shortness of breath  Physical exam:  Murmur heard on cardiac exam but otherwise regular rate rhythm lungs are clear to auscultation bilaterally  Cranial nerves 3-12 are intact  Strength 5/5 in bilateral upper lower extremities  Sensation intact  Concern for:  Migraine verses headache due to elevated blood pressure   1343 Will give a migraine cocktail to see if it helps alleviate headache improved blood pressure  1343 EKG shows normal sinus rhythm with left bundle branch block and PVCs  When compared to prior EKGs from March 19, 2020, PVCs not present however left bundle-branch block is present    Therefore no acute ischemic changes noted  1458 Blood Pressure: 149/66  Headache improved with headache cocktail  Blood pressure improved also right as well   1503 Plan:  Discharge patient home follow-up with primary care doctor  Patient is agreeable with plan  Patient instructed to return emergency department for any new worsening symptoms  Patient expressed understanding  Patient was given the opportunity ask questions in the emergency department  All questions and concerns were addressed to the emergency department  SBIRT 20yo+    Flowsheet Row Most Recent Value   SBIRT (25 yo +)    In order to provide better care to our patients, we are screening all of our patients for alcohol and drug use  Would it be okay to ask you these screening questions? No Filed at: 11/04/2022 1300                MDM  Number of Diagnoses or Management Options  Headache: new and requires workup  High blood pressure: new and requires workup  Diagnosis management comments: See ED course for medical decision making       Amount and/or Complexity of Data Reviewed  Review and summarize past medical records: yes        Disposition  Final diagnoses:   Headache   High blood pressure     Time reflects when diagnosis was documented in both MDM as applicable and the Disposition within this note     Time User Action Codes Description Comment    11/4/2022  3:02 PM Coleen Redd Add [R51 9] Headache     11/4/2022  3:02 PM Renny, [id number] High blood pressure       ED Disposition     ED Disposition   Discharge    Condition   Stable    Date/Time   Fri Nov 4, 2022  3:02 PM    Luis Carlos Lemus discharge to home/self care  Follow-up Information     Follow up With Specialties Details Why Contact Info    Compa Case MD Internal Medicine Call in 3 days for follow-up after being evaluated in the ER  [address] [facility] [address]  [address] [place] [address] [facility]  [phone] Discharge Medication List as of 11/4/2022  3:05 PM      CONTINUE these medications which have NOT CHANGED    Details   ALPRAZolam (XANAX) 0 5 mg tablet Take 0 5 mg by mouth daily at bedtime as needed  , Starting Sat 4/28/2018, Historical Med      dexlansoprazole (Dexilant) 60 MG capsule Take 60 mg by mouth daily, Historical Med      diclofenac (VOLTAREN) 75 mg EC tablet Starting Tue 9/21/2021, Historical Med      esomeprazole (NexIUM) 20 mg capsule Take 20 mg by mouth every morning before breakfast, Historical Med      famotidine (PEPCID) 20 mg tablet Starting Tue 8/16/2022, Historical Med      latanoprost (XALATAN) 0 005 % ophthalmic solution 1 drop daily at bedtime, Historical Med      Linzess 72 MCG CAPS 72 mcg daily in the early morning , Starting Mon 4/2/2018, Historical Med      losartan (COZAAR) 100 MG tablet Take 100 mg by mouth daily, Historical Med      losartan-hydrochlorothiazide (HYZAAR) 100-25 MG per tablet Take 1 tablet by mouth as needed, Historical Med      !! Synthroid 100 MCG tablet Starting u 9/1/2022, Historical Med      !! SYNTHROID 112 MCG tablet Take 100 mcg by mouth daily, Starting Mon 4/2/2018, Historical Med       !! - Potential duplicate medications found  Please discuss with provider  No discharge procedures on file  PDMP Review     None           ED Provider  Attending physically available and evaluated [id number] I managed the patient along with the ED Attending      Electronically Signed by         Rodger Barlow DO  11/04/22 6545

## 2022-11-04 NOTE — ED ATTENDING ATTESTATION
11/4/2022  I, Michelle Dinh MD, saw and evaluated the patient  I have discussed the patient with the resident/non-physician practitioner and agree with the resident's/non-physician practitioner's findings, Plan of Care, and MDM as documented in the resident's/non-physician practitioner's note, except where noted  All available labs and Radiology studies were reviewed  I was present for key portions of any procedure(s) performed by the resident/non-physician practitioner and I was immediately available to provide assistance  At this point I agree with the current assessment done in the Emergency Department  I have conducted an independent evaluation of this patient a history and physical is as follows:    S:  Chief Complaint   Patient presents with   • Headache          • Hypertension     Pt was at a gynecologist appointment and started getting a bad headache  Pt's pressure at her appointment was 158/110  Denies dizziness and blurred vision  States she did take her blood pressure medication today  Luciano Garcia presents here for evaluation after being referred by her GYN  She was in the office for pessary placement  Her vital signs showed hypertension (146/102) and she was referred here for evaluation  She reports that she was been feeling off with a headache and mild abdominal pain  She is anxious but otherwise non-toxic upon arrival   No chest pain, no, shortness of breath, no palpitations, no vomiting, no diaphoresis  She reports to me that she had similar symptoms the last time she was in the same GYN office  She had no symptoms prior to arrival at the GYN's office  O:  ED Triage Vitals [11/04/22 1235]   Temperature Pulse Respirations Blood Pressure SpO2   98 3 °F (36 8 °C) 78 17 (!) 171/76 98 %      Temp src Heart Rate Source Patient Position - Orthostatic VS BP Location FiO2 (%)   -- -- -- -- --      Pain Score       5         Physical Exam  Vitals and nursing note reviewed  Constitutional:       General: Distressed: mild  Appearance: She is well-developed  HENT:      Head: Normocephalic and atraumatic  Eyes:      Extraocular Movements: Extraocular movements intact  Pupils: Pupils are equal, round, and reactive to light  Neck:      Vascular: No JVD  Cardiovascular:      Rate and Rhythm: Normal rate and regular rhythm  Heart sounds: Normal heart sounds  No murmur heard  No friction rub  No gallop  Pulmonary:      Effort: Pulmonary effort is normal  No respiratory distress  Breath sounds: Normal breath sounds  No wheezing or rales  Chest:      Chest wall: No tenderness  Musculoskeletal:         General: No tenderness  Normal range of motion  Cervical back: Normal range of motion  Skin:     General: Skin is warm and dry  Neurological:      General: No focal deficit present  Mental Status: She is alert and oriented to person, place, and time  Psychiatric:         Mood and Affect: Mood is anxious  Behavior: Behavior normal          Thought Content:  Thought content normal          Judgment: Judgment normal        A/P:  Background: 67 y o  female presents with headache, hypertension    Differential DX includes but is not limited to: tension headache, chronic hypertension (she reports she did not take her medications yet), doubt CVA, doubt ACS    Plan: migraine cocktail and re-evaluation, ekg      ED Course     Medications   diphenhydrAMINE (BENADRYL) injection 25 mg (25 mg Intravenous Given 11/4/22 1338)   metoclopramide (REGLAN) injection 10 mg (10 mg Intravenous Given 11/4/22 1338)   dexamethasone (DECADRON) injection 10 mg (10 mg Intravenous Given 11/4/22 1338)   magnesium sulfate 2 g/50 mL IVPB (premix) 2 g (0 g Intravenous Stopped 11/4/22 1444)         Critical Care Time  Procedures    Time reflects when diagnosis was documented in both MDM as applicable and the Disposition within this note     Time User Action Codes Description Comment    11/4/2022  3:02 PM Ilean How Add [R51 9] Headache     11/4/2022  3:02 PM Ilean How Add [I10] High blood pressure       ED Disposition     ED Disposition   Discharge    Condition   Stable    Date/Time   Fri Nov 4, 2022  3:02 PM    Comment   Coco Janine Lemus discharge to home/self care  Follow-up Information     Follow up With Specialties Details Why Contact Info    Mendel Roberson MD Internal Medicine Call in 3 days for follow-up after being evaluated in the ER  [address] [facility]  [address]  [address] [place] [address] [facility]  [phone]

## 2022-11-04 NOTE — PROGRESS NOTES
Assessment/Plan:      Diagnoses and all orders for this visit:    Uterine prolapse  Pessary placed - f/u in 1 month    Pt felt unwell when getting up after pessary placement  She appeared flushed  Given new onset headache and elevated BP it is recommended she go to the ED  Her  will pick her up here  Subjective:     Patient ID: Ale Bowles is a 67 y o  female  68 yo  presents for pessary placement  She was previously fit for a #3 ring pessary for prolapse  On presentation she reports she has felt "off" for the past few days  She has abdominal and throat pain  She also just started to have a headache  Review of Systems   Gastrointestinal: Positive for abdominal pain  Neurological: Positive for headaches  All other systems reviewed and are negative  Objective:     Physical Exam  Genitourinary:     Labia:         Right: No rash  Left: No rash         Comments: #3 ring pessary placed w/o issue

## 2022-11-05 ENCOUNTER — APPOINTMENT (OUTPATIENT)
Dept: LAB | Facility: MEDICAL CENTER | Age: 72
End: 2022-11-05

## 2022-11-05 DIAGNOSIS — E83.52 SERUM CALCIUM ELEVATED: ICD-10-CM

## 2022-11-05 LAB
CA-I BLD-SCNC: 1.29 MMOL/L (ref 1.12–1.32)
CALCIUM SERPL-MCNC: 10 MG/DL (ref 8.3–10.1)

## 2022-11-06 LAB
ATRIAL RATE: 80 BPM
P AXIS: 49 DEGREES
PR INTERVAL: 160 MS
QRS AXIS: -20 DEGREES
QRSD INTERVAL: 146 MS
QT INTERVAL: 420 MS
QTC INTERVAL: 484 MS
T WAVE AXIS: 104 DEGREES
VENTRICULAR RATE: 80 BPM

## 2022-11-10 ENCOUNTER — APPOINTMENT (OUTPATIENT)
Dept: LAB | Facility: MEDICAL CENTER | Age: 72
End: 2022-11-10

## 2022-11-10 DIAGNOSIS — I10 HYPERTENSION, UNSPECIFIED TYPE: ICD-10-CM

## 2022-11-12 LAB
ALBUMIN SERPL ELPH-MCNC: 3.98 G/DL (ref 3.5–5)
ALBUMIN SERPL ELPH-MCNC: 56.1 % (ref 52–65)
ALPHA1 GLOB SERPL ELPH-MCNC: 0.23 G/DL (ref 0.1–0.4)
ALPHA1 GLOB SERPL ELPH-MCNC: 3.3 % (ref 2.5–5)
ALPHA2 GLOB SERPL ELPH-MCNC: 0.88 G/DL (ref 0.4–1.2)
ALPHA2 GLOB SERPL ELPH-MCNC: 12.4 % (ref 7–13)
BETA GLOB ABNORMAL SERPL ELPH-MCNC: 0.45 G/DL (ref 0.4–0.8)
BETA1 GLOB SERPL ELPH-MCNC: 6.4 % (ref 5–13)
BETA2 GLOB SERPL ELPH-MCNC: 6.7 % (ref 2–8)
BETA2+GAMMA GLOB SERPL ELPH-MCNC: 0.48 G/DL (ref 0.2–0.5)
GAMMA GLOB ABNORMAL SERPL ELPH-MCNC: 1.07 G/DL (ref 0.5–1.6)
GAMMA GLOB SERPL ELPH-MCNC: 15.1 % (ref 12–22)
IGG/ALB SER: 1.28 {RATIO} (ref 1.1–1.8)
PROT PATTERN SERPL ELPH-IMP: NORMAL
PROT SERPL-MCNC: 7.1 G/DL (ref 6.4–8.2)

## 2022-11-14 ENCOUNTER — TELEPHONE (OUTPATIENT)
Dept: OBGYN CLINIC | Facility: CLINIC | Age: 72
End: 2022-11-14

## 2022-11-14 ENCOUNTER — HOSPITAL ENCOUNTER (OUTPATIENT)
Dept: RADIOLOGY | Facility: MEDICAL CENTER | Age: 72
Discharge: HOME | End: 2022-11-14

## 2022-11-14 DIAGNOSIS — R22.1 NECK MASS: ICD-10-CM

## 2022-11-14 NOTE — TELEPHONE ENCOUNTER
Unlikely from the pessary - more likely the medication  Thanks for checking on her  Was that the soonest we can get her in or did she want to wait until then?

## 2022-11-14 NOTE — TELEPHONE ENCOUNTER
I called Zane Becker this morning, she feels better and is on another BP medication  She was wondering if she could get an allergic reaction to the pessary as her cheeks were red, had a headache, and her ears were itchy she also was given a medication in the hospital  Please advise  She is to come back in January for a pessary fitting as hers fell out

## 2022-11-14 NOTE — TELEPHONE ENCOUNTER
----- Message from Shar Santamaria sent at 11/4/2022 11:57 AM EDT -----  Regarding: ryan follow up  Call patient to make sure her bp is good and she feels ok

## 2022-11-28 ENCOUNTER — HOSPITAL ENCOUNTER (OUTPATIENT)
Dept: RADIOLOGY | Facility: MEDICAL CENTER | Age: 72
Discharge: HOME | End: 2022-11-28

## 2022-11-28 DIAGNOSIS — Z13.820 ENCOUNTER FOR SCREENING FOR OSTEOPOROSIS: ICD-10-CM

## 2022-12-06 ENCOUNTER — TELEPHONE (OUTPATIENT)
Dept: OBGYN CLINIC | Facility: CLINIC | Age: 72
End: 2022-12-06

## 2022-12-06 NOTE — TELEPHONE ENCOUNTER
----- Message from Rissa Ballesteros MD sent at 12/6/2022  1:54 PM EST -----  Please notify dexa shows osteoporosis  Should discuss treatment options w/ PCP or rheumatology  OK to place referral if needed

## 2022-12-06 NOTE — TELEPHONE ENCOUNTER
Spoke to pt and reviewed results and recommendations from their dexa per  CS  She will reach out to PCP- she just had labs with them last month- she said no one went over the results with her- she said she tried calling them but line was bust- advised to give a try again- ask about results and what she might be able to do for osteoporosis

## 2023-01-06 ENCOUNTER — APPOINTMENT (OUTPATIENT)
Dept: LAB | Facility: MEDICAL CENTER | Age: 73
End: 2023-01-06

## 2023-01-06 DIAGNOSIS — I10 ESSENTIAL HYPERTENSION, BENIGN: ICD-10-CM

## 2023-01-06 LAB
ANION GAP SERPL CALCULATED.3IONS-SCNC: 5 MMOL/L (ref 4–13)
BUN SERPL-MCNC: 16 MG/DL (ref 5–25)
CALCIUM SERPL-MCNC: 10.1 MG/DL (ref 8.3–10.1)
CHLORIDE SERPL-SCNC: 102 MMOL/L (ref 96–108)
CO2 SERPL-SCNC: 31 MMOL/L (ref 21–32)
CREAT SERPL-MCNC: 0.72 MG/DL (ref 0.6–1.3)
GFR SERPL CREATININE-BSD FRML MDRD: 83 ML/MIN/1.73SQ M
GLUCOSE P FAST SERPL-MCNC: 113 MG/DL (ref 65–99)
POTASSIUM SERPL-SCNC: 4 MMOL/L (ref 3.5–5.3)
SODIUM SERPL-SCNC: 138 MMOL/L (ref 135–147)

## 2023-01-10 ENCOUNTER — OFFICE VISIT (OUTPATIENT)
Dept: OBGYN CLINIC | Facility: MEDICAL CENTER | Age: 73
End: 2023-01-10

## 2023-01-10 VITALS
DIASTOLIC BLOOD PRESSURE: 70 MMHG | BODY MASS INDEX: 37.47 KG/M2 | WEIGHT: 203.6 LBS | HEIGHT: 62 IN | SYSTOLIC BLOOD PRESSURE: 118 MMHG

## 2023-01-10 DIAGNOSIS — N81.11 MIDLINE CYSTOCELE: Primary | ICD-10-CM

## 2023-01-10 RX ORDER — AMOXICILLIN 500 MG/1
2000 CAPSULE ORAL
COMMUNITY
Start: 2022-12-06

## 2023-01-10 RX ORDER — HYDROCHLOROTHIAZIDE 25 MG/1
TABLET ORAL
COMMUNITY
Start: 2022-12-27

## 2023-01-10 RX ORDER — METOPROLOL TARTRATE 50 MG/1
50 TABLET, FILM COATED ORAL EVERY 12 HOURS
COMMUNITY
Start: 2022-12-06

## 2023-01-10 NOTE — PROGRESS NOTES
Assessment/Plan:      Diagnoses and all orders for this visit:    Midline cystocele  Fit for #4 ring pessary  F/u in 1 month  If this one falls out may decline further trials  Other orders  -     amoxicillin (AMOXIL) 500 mg capsule; Take 2,000 mg by mouth 60 minutes pre-procedure  -     hydrochlorothiazide (HYDRODIURIL) 25 mg tablet; TAKE 1 TABLET (25 MG TOTAL) BY MOUTH 2 (TWO) TIMES A WEEK  -     metoprolol tartrate (LOPRESSOR) 50 mg tablet; Take 50 mg by mouth every 12 (twelve) hours          Subjective:     Patient ID: Mayra Slade is a 67 y o  female  66 yo  for a pessary fitting, recently had #3 ring pessary inserted on 22 which fell out when she had a bowel movement  She continues to feel the prolapse  Some days its more bothersome than others  Review of Systems   All other systems reviewed and are negative  Objective:     Physical Exam  Pulmonary:      Effort: Pulmonary effort is normal    Genitourinary:     Labia:         Right: No rash  Left: No rash  Comments: Tried #4 and #5 ring pessary  Had some discomfort w/ #5  Neurological:      Mental Status: She is alert and oriented to person, place, and time     Psychiatric:         Behavior: Behavior normal

## 2023-02-07 ENCOUNTER — OFFICE VISIT (OUTPATIENT)
Dept: OBGYN CLINIC | Facility: CLINIC | Age: 73
End: 2023-02-07

## 2023-02-07 VITALS
HEART RATE: 57 BPM | BODY MASS INDEX: 38.09 KG/M2 | HEIGHT: 62 IN | WEIGHT: 207 LBS | DIASTOLIC BLOOD PRESSURE: 70 MMHG | SYSTOLIC BLOOD PRESSURE: 105 MMHG

## 2023-02-07 DIAGNOSIS — M17.12 PRIMARY OSTEOARTHRITIS OF LEFT KNEE: Primary | ICD-10-CM

## 2023-02-07 DIAGNOSIS — M21.70 LEG LENGTH DISCREPANCY: ICD-10-CM

## 2023-02-07 RX ORDER — KETOROLAC TROMETHAMINE 30 MG/ML
30 INJECTION, SOLUTION INTRAMUSCULAR; INTRAVENOUS
Status: COMPLETED | OUTPATIENT
Start: 2023-02-07 | End: 2023-02-07

## 2023-02-07 RX ORDER — BUPIVACAINE HYDROCHLORIDE 2.5 MG/ML
2 INJECTION, SOLUTION INFILTRATION; PERINEURAL
Status: COMPLETED | OUTPATIENT
Start: 2023-02-07 | End: 2023-02-07

## 2023-02-07 RX ORDER — METHYLPREDNISOLONE ACETATE 40 MG/ML
2 INJECTION, SUSPENSION INTRA-ARTICULAR; INTRALESIONAL; INTRAMUSCULAR; SOFT TISSUE
Status: COMPLETED | OUTPATIENT
Start: 2023-02-07 | End: 2023-02-07

## 2023-02-07 RX ADMIN — KETOROLAC TROMETHAMINE 30 MG: 30 INJECTION, SOLUTION INTRAMUSCULAR; INTRAVENOUS at 14:08

## 2023-02-07 RX ADMIN — METHYLPREDNISOLONE ACETATE 2 ML: 40 INJECTION, SUSPENSION INTRA-ARTICULAR; INTRALESIONAL; INTRAMUSCULAR; SOFT TISSUE at 14:08

## 2023-02-07 RX ADMIN — BUPIVACAINE HYDROCHLORIDE 2 ML: 2.5 INJECTION, SOLUTION INFILTRATION; PERINEURAL at 14:08

## 2023-02-07 NOTE — PROGRESS NOTES
Assessment:   Diagnosis ICD-10-CM Associated Orders   1  Leg length discrepancy  M21 70 Durable Medical Equipment      2  Left knee DJD    Plan:  • Nonoperative treatments were discussed with the patient that included localized cortisone injection to her left knee today  Patient wished to proceed with the knee injection which she tolerated well  • New DME referral for a shoe lift for the left extremity half inch to three-quarter inch in height  Instructed the patient to take the DME prescription to DME fitter that was provided in her after visit summary  • We will follow-up in 3 months for another injection for the left knee  To do next visit:  Return in about 3 months (around 5/7/2023) for left knee  The above stated was discussed in layman's terms and the patient expressed understanding  All questions were answered to the patient's satisfaction  Scribe Attestation    I,:  Mauricio Merritt am acting as a scribe while in the presence of the attending physician :       I,:  Primo Gutierrez MD personally performed the services described in this documentation    as scribed in my presence :             Subjective:   Nicky Grace is a 67 y o  female who presents today for a follow-up for her left knee pain due to osteoarthritis  Patient has treated with cortisone injections in the past that has given her significant pain relief  Her last injection was 11/1/2022 that gave her good relief until a couple weeks ago  The cold weather seems to bother the knee  She has changed form Diclofenac to Tylenol arthritis  She takes 1 in the AM and 1 in PM         Review of systems negative unless otherwise specified in HPI  Review of Systems   Constitutional: Negative for chills, fever and unexpected weight change  HENT: Negative for hearing loss, nosebleeds and sore throat  Eyes: Negative for pain, redness and visual disturbance  Respiratory: Negative for cough, shortness of breath and wheezing  Cardiovascular: Negative for chest pain, palpitations and leg swelling  Gastrointestinal: Negative for abdominal pain and nausea  Genitourinary: Negative for dyspareunia, dysuria and frequency  Musculoskeletal: Positive for arthralgias  Skin: Negative for rash and wound  Neurological: Negative for dizziness, numbness and headaches  Psychiatric/Behavioral: Negative for decreased concentration and suicidal ideas  The patient is not nervous/anxious  Past Medical History:   Diagnosis Date   • Anxiety    • Fibromyalgia    • H/O cardiovascular stress test    • H/O echocardiogram    • HBP (high blood pressure)    • Hypothyroidism    • IBS (irritable bowel syndrome)    • Vascular disease     Varicose veins       Past Surgical History:   Procedure Laterality Date   • CARDIAC CATHETERIZATION      EF 50%  No significant CAD     •  SECTION     • CHOLECYSTECTOMY     • HERNIA REPAIR     • REPLACEMENT TOTAL KNEE Right    • TOTAL HIP ARTHROPLASTY Right    • VEIN SURGERY Bilateral      Dr Sanford Santana       Family History   Problem Relation Age of Onset   • Cancer Mother    • Heart failure Mother    • Heart disease Mother    • Lung cancer Father    • Hypertension Sister    • Breast cancer Sister         over 48   • No Known Problems Sister    • No Known Problems Sister    • No Known Problems Sister    • Cancer Sister    • Hypertension Brother    • Heart disease Brother    • Cancer Brother    • Prostate cancer Brother    • No Known Problems Brother    • No Known Problems Brother    • No Known Problems Brother    • No Known Problems Brother    • No Known Problems Daughter    • No Known Problems Son    • No Known Problems Son    • No Known Problems Maternal Grandmother    • No Known Problems Maternal Grandfather    • No Known Problems Paternal Grandmother    • No Known Problems Paternal Grandfather    • Coronary artery disease Other        Social History     Occupational History   • Occupation: Retired   Tobacco Use   • Smoking status: Former   • Smokeless tobacco: Never   • Tobacco comments:     pt quit in her 35s   Vaping Use   • Vaping Use: Never used   Substance and Sexual Activity   • Alcohol use: No   • Drug use: No   • Sexual activity: Not Currently     Birth control/protection: Post-menopausal         Current Outpatient Medications:   •  amoxicillin (AMOXIL) 500 mg capsule, Take 2,000 mg by mouth 60 minutes pre-procedure, Disp: , Rfl:   •  dexlansoprazole (Dexilant) 60 MG capsule, Take 60 mg by mouth daily, Disp: , Rfl:   •  esomeprazole (NexIUM) 20 mg capsule, Take 20 mg by mouth every morning before breakfast, Disp: , Rfl:   •  famotidine (PEPCID) 20 mg tablet, , Disp: , Rfl:   •  hydrochlorothiazide (HYDRODIURIL) 25 mg tablet, TAKE 1 TABLET (25 MG TOTAL) BY MOUTH 2 (TWO) TIMES A WEEK , Disp: , Rfl:   •  latanoprost (XALATAN) 0 005 % ophthalmic solution, 1 drop daily at bedtime, Disp: , Rfl:   •  Linzess 72 MCG CAPS, 72 mcg daily in the early morning , Disp: , Rfl:   •  losartan (COZAAR) 100 MG tablet, Take 100 mg by mouth daily, Disp: , Rfl:   •  metoprolol tartrate (LOPRESSOR) 50 mg tablet, Take 50 mg by mouth every 12 (twelve) hours, Disp: , Rfl:   •  Synthroid 100 MCG tablet, , Disp: , Rfl:   •  losartan-hydrochlorothiazide (HYZAAR) 100-25 MG per tablet, Take 1 tablet by mouth as needed (Patient not taking: Reported on 10/21/2022), Disp: , Rfl:   •  SYNTHROID 112 MCG tablet, Take 100 mcg by mouth daily (Patient not taking: Reported on 1/10/2023), Disp: , Rfl:     Allergies   Allergen Reactions   • Escitalopram Diarrhea and GI Intolerance   • Food Throat Swelling     FAT FREE SALAD DRESSING   • Hydrocodone-Acetaminophen Other (See Comments)     Heat intolerance, flushing   • Lactose Intolerance [Tilactase] Abdominal Pain   • Meloxicam Other (See Comments)     Other reaction(s): nose bleeds   • Methylprednisolone Other (See Comments)     Red face   • Niacin Facial Swelling     Other reaction(s): Unknown Allergic Reaction   • Norco [Hydrocodone-Acetaminophen] Other (See Comments)     Heat intolerance, flushing   • Gabapentin Anxiety            Vitals:    02/07/23 1332   BP: 105/70   Pulse: 57       Objective:    General: No apparent distress  CV: S1-S2  Chest: No audible wheezing  Abdomen: Soft                    Left Knee Exam     Tenderness   The patient is experiencing tenderness in the lateral joint line (distal IT band)  Range of Motion   Extension: 0   Flexion: 120     Tests   Varus: negative Valgus: negative    Other   Erythema: absent  Sensation: normal  Pulse: present  Swelling: none  Effusion: no effusion present    Comments:  Calf is soft and non tender            Diagnostics, reviewed and taken today if performed as documented:    None performed      The attending physician has personally reviewed the pertinent films in PACS and interpretation is as follows:  Patient noted to have leg length discrepancy of 2 cm on CT scanogram    Procedures, if performed today:    Large joint arthrocentesis: L knee  Universal Protocol:  Consent: Verbal consent obtained  Risks and benefits: risks, benefits and alternatives were discussed  Consent given by: patient  Time out: Immediately prior to procedure a "time out" was called to verify the correct patient, procedure, equipment, support staff and site/side marked as required    Timeout called at: 2/7/2023 2:07 PM   Patient understanding: patient states understanding of the procedure being performed  Site marked: the operative site was marked  Patient identity confirmed: verbally with patient    Supporting Documentation  Indications: pain   Procedure Details  Location: knee - L knee  Preparation: Patient was prepped and draped in the usual sterile fashion  Needle size: 22 G  Ultrasound guidance: no  Approach: anterolateral  Medications administered: 2 mL bupivacaine 0 25 %; 2 mL methylPREDNISolone acetate 40 mg/mL; 30 mg ketorolac 30 mg/mL    Patient tolerance: patient tolerated the procedure well with no immediate complications  Dressing:  Sterile dressing applied            Portions of the record may have been created with voice recognition software  Occasional wrong word or "sound a like" substitutions may have occurred due to the inherent limitations of voice recognition software  Read the chart carefully and recognize, using context, where substitutions have occurred

## 2023-02-07 NOTE — PATIENT INSTRUCTIONS
Today, We recommended a brace/prosthesis for you  [facility] certified pedorthotists make orthotics but not braces or prosthesis  Below are the companies in the area that make these, Please call ahead for an appointment and to ensure the company accepts your insurance  Make sure to bring the prescription given to you in the office today to the company for the brace/prosthesis  [place]  [address] [place]  [address] [facility] [address] [address] Phone: [phone]  [place] Fax: [phone]    [address] [place] [address]  [address]  [place], [address] [place] [address] [address]  (By Appointment Only)  Directions  [address] [facility], [address] [facility] [address] [place] Phone: [phone]  [place] Fax: [address] [facility] Location  [address]   [address] [facility]  [phone] (fax)    [facility]  [address] [facility], [address] [facility],[address]  [facility], [address]  [phone] (fax)    [facility]  [address] [facility], [address] [facility] [address]  [phone] (fax)    [facility] Location  [address] [facility], [address] [facility] [address]  [address]  [phone] (fax)    [place] [address] , [address], [place]   [phone] (fax)

## 2023-03-08 ENCOUNTER — APPOINTMENT (OUTPATIENT)
Dept: LAB | Facility: MEDICAL CENTER | Age: 73
End: 2023-03-08

## 2023-03-08 DIAGNOSIS — M81.0 SENILE OSTEOPOROSIS: ICD-10-CM

## 2023-03-08 DIAGNOSIS — E03.9 ACQUIRED HYPOTHYROIDISM: ICD-10-CM

## 2023-03-08 DIAGNOSIS — K21.9 GASTROESOPHAGEAL REFLUX DISEASE WITHOUT ESOPHAGITIS: ICD-10-CM

## 2023-03-08 LAB
25(OH)D3 SERPL-MCNC: 43.1 NG/ML (ref 30–100)
ALBUMIN SERPL BCP-MCNC: 3.7 G/DL (ref 3.5–5)
ALP SERPL-CCNC: 90 U/L (ref 46–116)
ALT SERPL W P-5'-P-CCNC: 26 U/L (ref 12–78)
ANION GAP SERPL CALCULATED.3IONS-SCNC: 3 MMOL/L (ref 4–13)
AST SERPL W P-5'-P-CCNC: 26 U/L (ref 5–45)
BASOPHILS # BLD AUTO: 0.04 THOUSANDS/ÂΜL (ref 0–0.1)
BASOPHILS NFR BLD AUTO: 1 % (ref 0–1)
BILIRUB SERPL-MCNC: 1.01 MG/DL (ref 0.2–1)
BUN SERPL-MCNC: 19 MG/DL (ref 5–25)
CALCIUM SERPL-MCNC: 10 MG/DL (ref 8.3–10.1)
CHLORIDE SERPL-SCNC: 104 MMOL/L (ref 96–108)
CO2 SERPL-SCNC: 29 MMOL/L (ref 21–32)
CREAT SERPL-MCNC: 0.54 MG/DL (ref 0.6–1.3)
CRP SERPL QL: <3 MG/L
EOSINOPHIL # BLD AUTO: 0.06 THOUSAND/ÂΜL (ref 0–0.61)
EOSINOPHIL NFR BLD AUTO: 1 % (ref 0–6)
ERYTHROCYTE [DISTWIDTH] IN BLOOD BY AUTOMATED COUNT: 13 % (ref 11.6–15.1)
GFR SERPL CREATININE-BSD FRML MDRD: 94 ML/MIN/1.73SQ M
GLUCOSE P FAST SERPL-MCNC: 84 MG/DL (ref 65–99)
HCT VFR BLD AUTO: 39.4 % (ref 34.8–46.1)
HGB BLD-MCNC: 12.9 G/DL (ref 11.5–15.4)
IGA SERPL-MCNC: 392 MG/DL (ref 70–400)
IGG SERPL-MCNC: 1000 MG/DL (ref 700–1600)
IGM SERPL-MCNC: 44 MG/DL (ref 40–230)
IMM GRANULOCYTES # BLD AUTO: 0.02 THOUSAND/UL (ref 0–0.2)
IMM GRANULOCYTES NFR BLD AUTO: 0 % (ref 0–2)
LYMPHOCYTES # BLD AUTO: 2.61 THOUSANDS/ÂΜL (ref 0.6–4.47)
LYMPHOCYTES NFR BLD AUTO: 37 % (ref 14–44)
MCH RBC QN AUTO: 32.3 PG (ref 26.8–34.3)
MCHC RBC AUTO-ENTMCNC: 32.7 G/DL (ref 31.4–37.4)
MCV RBC AUTO: 99 FL (ref 82–98)
MONOCYTES # BLD AUTO: 0.61 THOUSAND/ÂΜL (ref 0.17–1.22)
MONOCYTES NFR BLD AUTO: 9 % (ref 4–12)
NEUTROPHILS # BLD AUTO: 3.74 THOUSANDS/ÂΜL (ref 1.85–7.62)
NEUTS SEG NFR BLD AUTO: 52 % (ref 43–75)
NRBC BLD AUTO-RTO: 0 /100 WBCS
PLATELET # BLD AUTO: 302 THOUSANDS/UL (ref 149–390)
PMV BLD AUTO: 9.9 FL (ref 8.9–12.7)
POTASSIUM SERPL-SCNC: 4.3 MMOL/L (ref 3.5–5.3)
PROT SERPL-MCNC: 7.3 G/DL (ref 6.4–8.4)
PTH-INTACT SERPL-MCNC: 51.2 PG/ML (ref 18.4–80.1)
RBC # BLD AUTO: 4 MILLION/UL (ref 3.81–5.12)
SODIUM SERPL-SCNC: 136 MMOL/L (ref 135–147)
TSH SERPL DL<=0.05 MIU/L-ACNC: 0.41 UIU/ML (ref 0.45–4.5)
WBC # BLD AUTO: 7.08 THOUSAND/UL (ref 4.31–10.16)

## 2023-03-09 LAB
ALBUMIN SERPL ELPH-MCNC: 3.93 G/DL (ref 3.5–5)
ALBUMIN SERPL ELPH-MCNC: 56.2 % (ref 52–65)
ALPHA1 GLOB SERPL ELPH-MCNC: 0.25 G/DL (ref 0.1–0.4)
ALPHA1 GLOB SERPL ELPH-MCNC: 3.5 % (ref 2.5–5)
ALPHA2 GLOB SERPL ELPH-MCNC: 0.88 G/DL (ref 0.4–1.2)
ALPHA2 GLOB SERPL ELPH-MCNC: 12.6 % (ref 7–13)
BETA GLOB ABNORMAL SERPL ELPH-MCNC: 0.46 G/DL (ref 0.4–0.8)
BETA1 GLOB SERPL ELPH-MCNC: 6.5 % (ref 5–13)
BETA2 GLOB SERPL ELPH-MCNC: 6.8 % (ref 2–8)
BETA2+GAMMA GLOB SERPL ELPH-MCNC: 0.48 G/DL (ref 0.2–0.5)
ENDOMYSIUM IGA SER QL: NEGATIVE
GAMMA GLOB ABNORMAL SERPL ELPH-MCNC: 1.01 G/DL (ref 0.5–1.6)
GAMMA GLOB SERPL ELPH-MCNC: 14.4 % (ref 12–22)
GLIADIN PEPTIDE IGA SER-ACNC: 5 UNITS (ref 0–19)
GLIADIN PEPTIDE IGG SER-ACNC: 19 UNITS (ref 0–19)
IGA SERPL-MCNC: 439 MG/DL (ref 64–422)
IGG/ALB SER: 1.28 {RATIO} (ref 1.1–1.8)
PROT PATTERN SERPL ELPH-IMP: NORMAL
PROT SERPL-MCNC: 7 G/DL (ref 6.4–8.2)
TTG IGA SER-ACNC: 2 U/ML (ref 0–3)
TTG IGG SER-ACNC: <2 U/ML (ref 0–5)

## 2023-03-10 ENCOUNTER — APPOINTMENT (OUTPATIENT)
Dept: LAB | Facility: MEDICAL CENTER | Age: 73
End: 2023-03-10

## 2023-03-10 DIAGNOSIS — M81.0 SENILE OSTEOPOROSIS: ICD-10-CM

## 2023-03-10 LAB
CALCIUM 24H UR-MCNC: 255.15 MG/24 HRS (ref 42–353)
SPECIMEN VOL UR: 2025 ML

## 2023-03-17 ENCOUNTER — HOSPITAL ENCOUNTER (OUTPATIENT)
Dept: RADIOLOGY | Facility: MEDICAL CENTER | Age: 73
Discharge: HOME | End: 2023-03-17

## 2023-03-17 ENCOUNTER — TELEPHONE (OUTPATIENT)
Dept: OBGYN CLINIC | Facility: MEDICAL CENTER | Age: 73
End: 2023-03-17

## 2023-03-17 VITALS — HEIGHT: 63 IN | BODY MASS INDEX: 36.68 KG/M2 | WEIGHT: 207 LBS

## 2023-03-17 DIAGNOSIS — Z12.31 ENCOUNTER FOR SCREENING MAMMOGRAM FOR MALIGNANT NEOPLASM OF BREAST: ICD-10-CM

## 2023-03-17 NOTE — TELEPHONE ENCOUNTER
Pt came in to the office to return her used pessary and to discuss that it keeps falling out. CS told her at her last appt if it keeps falling out they would not try another one. She is unsure of what to do moving forward. Please call to advise.

## 2023-03-19 NOTE — TELEPHONE ENCOUNTER
It's up to her if she wants to continue to try different shape and size pessaries. I'm fine with trying again if she is. The other option would be referral to urogyn and possible surgery.

## 2023-03-28 PROBLEM — M81.0 SENILE OSTEOPOROSIS: Status: ACTIVE | Noted: 2023-03-28

## 2023-03-28 PROBLEM — M17.12 PRIMARY OSTEOARTHRITIS OF LEFT KNEE: Status: ACTIVE | Noted: 2023-03-28

## 2023-03-28 PROBLEM — M47.816 LUMBAR SPONDYLOSIS: Status: ACTIVE | Noted: 2023-03-28

## 2023-04-19 ENCOUNTER — APPOINTMENT (OUTPATIENT)
Dept: LAB | Facility: MEDICAL CENTER | Age: 73
End: 2023-04-19

## 2023-04-19 DIAGNOSIS — E78.00 HYPERCHOLESTEROLEMIA: ICD-10-CM

## 2023-04-19 DIAGNOSIS — R73.9 ELEVATED BLOOD SUGAR: ICD-10-CM

## 2023-04-19 LAB
CK SERPL-CCNC: 53 U/L (ref 26–192)
T3 SERPL-MCNC: 0.9 NG/ML (ref 0.6–1.8)
T4 SERPL-MCNC: 13.8 UG/DL (ref 4.7–13.3)

## 2023-05-09 ENCOUNTER — OFFICE VISIT (OUTPATIENT)
Dept: OBGYN CLINIC | Facility: CLINIC | Age: 73
End: 2023-05-09

## 2023-05-09 VITALS
HEIGHT: 63 IN | BODY MASS INDEX: 37.21 KG/M2 | SYSTOLIC BLOOD PRESSURE: 120 MMHG | DIASTOLIC BLOOD PRESSURE: 77 MMHG | WEIGHT: 210 LBS

## 2023-05-09 DIAGNOSIS — M17.12 PRIMARY OSTEOARTHRITIS OF LEFT KNEE: Primary | ICD-10-CM

## 2023-05-09 RX ORDER — KETOROLAC TROMETHAMINE 30 MG/ML
30 INJECTION, SOLUTION INTRAMUSCULAR; INTRAVENOUS
Status: COMPLETED | OUTPATIENT
Start: 2023-05-09 | End: 2023-05-09

## 2023-05-09 RX ORDER — METHYLPREDNISOLONE ACETATE 40 MG/ML
2 INJECTION, SUSPENSION INTRA-ARTICULAR; INTRALESIONAL; INTRAMUSCULAR; SOFT TISSUE
Status: COMPLETED | OUTPATIENT
Start: 2023-05-09 | End: 2023-05-09

## 2023-05-09 RX ORDER — BUPIVACAINE HYDROCHLORIDE 2.5 MG/ML
1 INJECTION, SOLUTION INFILTRATION; PERINEURAL
Status: COMPLETED | OUTPATIENT
Start: 2023-05-09 | End: 2023-05-09

## 2023-05-09 RX ADMIN — METHYLPREDNISOLONE ACETATE 2 ML: 40 INJECTION, SUSPENSION INTRA-ARTICULAR; INTRALESIONAL; INTRAMUSCULAR; SOFT TISSUE at 14:43

## 2023-05-09 RX ADMIN — BUPIVACAINE HYDROCHLORIDE 1 ML: 2.5 INJECTION, SOLUTION INFILTRATION; PERINEURAL at 14:43

## 2023-05-09 RX ADMIN — KETOROLAC TROMETHAMINE 30 MG: 30 INJECTION, SOLUTION INTRAMUSCULAR; INTRAVENOUS at 14:43

## 2023-05-09 NOTE — PROGRESS NOTES
Assessment:   Diagnosis ICD-10-CM Associated Orders   1  Primary osteoarthritis of left knee  M17 12           Plan:  · On exam, she maintains good function ROM  · Nonoperative treatments were discussed with the patient that included localized cortisone injection to the left knee today  · We will continue to treat with injections as long as they give her relief  To do next visit:  Return in about 3 months (around 8/9/2023) for left knee  The above stated was discussed in layman's terms and the patient expressed understanding  All questions were answered to the patient's satisfaction  Scribe Attestation    I,:  Yunior Wilhelm am acting as a scribe while in the presence of the attending physician :       I,:  Celia Campos MD personally performed the services described in this documentation    as scribed in my presence :             Subjective:   Jonah Rodriguez is a 67 y o  female who presents today for a 3 months follow up for her left knee pain due to osteoarthritis  She continues to treat non operatively with periodic cortisone injections, last being 2/7/2023  She also treats with Tylenol arthritis 2x/daily  She reports that the last injection didn't give her as much release as the first injection  Review of systems negative unless otherwise specified in HPI  Review of Systems   Constitutional: Negative for chills, fever and unexpected weight change  HENT: Negative for hearing loss, nosebleeds and sore throat  Eyes: Negative for pain, redness and visual disturbance  Respiratory: Negative for cough, shortness of breath and wheezing  Cardiovascular: Negative for chest pain, palpitations and leg swelling  Gastrointestinal: Negative for abdominal pain and nausea  Genitourinary: Negative for dyspareunia, dysuria and frequency  Musculoskeletal: Positive for arthralgias  Skin: Negative for rash and wound  Neurological: Negative for dizziness, numbness and headaches  Psychiatric/Behavioral: Negative for decreased concentration and suicidal ideas  The patient is not nervous/anxious  Past Medical History:   Diagnosis Date   • Anxiety    • Fibromyalgia    • H/O cardiovascular stress test 2018   • H/O echocardiogram 2017   • HBP (high blood pressure)    • Hypothyroidism    • IBS (irritable bowel syndrome)    • Vascular disease     Varicose veins       Past Surgical History:   Procedure Laterality Date   • CARDIAC CATHETERIZATION      EF 50%  No significant CAD     •  SECTION     • CHOLECYSTECTOMY     • HERNIA REPAIR     • REPLACEMENT TOTAL KNEE Right    • TOTAL HIP ARTHROPLASTY Right 2019   • VEIN SURGERY Bilateral      Dr Em Gamion       Family History   Problem Relation Age of Onset   • Cancer Mother    • Heart failure Mother    • Heart disease Mother    • Lung cancer Father    • Hypertension Sister    • Breast cancer Sister         over 48   • No Known Problems Sister    • No Known Problems Sister    • No Known Problems Sister    • Lung cancer Sister    • Cancer Sister    • Skin cancer Sister    • No Known Problems Daughter    • No Known Problems Maternal Grandmother    • No Known Problems Maternal Grandfather    • No Known Problems Paternal Grandmother    • No Known Problems Paternal Grandfather    • Hypertension Brother    • Heart disease Brother    • Cancer Brother    • Prostate cancer Brother    • No Known Problems Brother    • No Known Problems Brother    • No Known Problems Brother    • No Known Problems Son    • No Known Problems Son    • Coronary artery disease Other        Social History     Occupational History   • Occupation: Retired   Tobacco Use   • Smoking status: Former   • Smokeless tobacco: Never   • Tobacco comments:     pt quit in her 35s   Vaping Use   • Vaping Use: Never used   Substance and Sexual Activity   • Alcohol use: No   • Drug use: No   • Sexual activity: Not Currently     Birth control/protection: Post-menopausal Current Outpatient Medications:   •  dexlansoprazole (Dexilant) 60 MG capsule, Take 60 mg by mouth daily, Disp: , Rfl:   •  hydrochlorothiazide (HYDRODIURIL) 25 mg tablet, TAKE 1 TABLET (25 MG TOTAL) BY MOUTH 2 (TWO) TIMES A WEEK , Disp: , Rfl:   •  latanoprost (XALATAN) 0 005 % ophthalmic solution, 1 drop daily at bedtime, Disp: , Rfl:   •  Linzess 72 MCG CAPS, 72 mcg daily in the early morning , Disp: , Rfl:   •  losartan (COZAAR) 100 MG tablet, Take 100 mg by mouth daily, Disp: , Rfl:   •  metoprolol tartrate (LOPRESSOR) 50 mg tablet, Take 50 mg by mouth every 12 (twelve) hours, Disp: , Rfl:   •  Synthroid 100 MCG tablet, , Disp: , Rfl:   •  amoxicillin (AMOXIL) 500 mg capsule, Take 2,000 mg by mouth 60 minutes pre-procedure (Patient not taking: Reported on 3/28/2023), Disp: , Rfl:   •  esomeprazole (NexIUM) 20 mg capsule, Take 20 mg by mouth every morning before breakfast (Patient not taking: Reported on 3/28/2023), Disp: , Rfl:   •  famotidine (PEPCID) 20 mg tablet, , Disp: , Rfl:   •  losartan-hydrochlorothiazide (HYZAAR) 100-25 MG per tablet, Take 1 tablet by mouth as needed (Patient not taking: Reported on 10/21/2022), Disp: , Rfl:   •  SYNTHROID 112 MCG tablet, Take 100 mcg by mouth daily (Patient not taking: Reported on 1/10/2023), Disp: , Rfl:     Allergies   Allergen Reactions   • Escitalopram Diarrhea and GI Intolerance   • Food Throat Swelling     FAT FREE SALAD DRESSING   • Hydrocodone-Acetaminophen Other (See Comments)     Heat intolerance, flushing   • Lactose Intolerance [Tilactase] Abdominal Pain   • Meloxicam Other (See Comments)     Other reaction(s): nose bleeds   • Methylprednisolone Other (See Comments)     Red face   • Niacin Facial Swelling     Other reaction(s): Unknown Allergic Reaction   • Norco [Hydrocodone-Acetaminophen] Other (See Comments)     Heat intolerance, flushing   • Gabapentin Anxiety            Vitals:    05/09/23 1401   BP: 120/77       Objective: "Left Knee Exam     Tenderness   The patient is experiencing tenderness in the medial joint line and patella  Range of Motion   Extension: 0   Flexion: 120     Tests   Varus: negative Valgus: negative    Other   Erythema: absent  Sensation: normal  Pulse: present  Swelling: none  Effusion: no effusion present    Comments:  Calf is soft and nontender            Diagnostics, reviewed and taken today if performed as documented:    None performed      The attending physician has personally reviewed the pertinent films in PACS and interpretation is as follows:      Procedures, if performed today:    Large joint arthrocentesis: L knee  Universal Protocol:  Consent: Verbal consent obtained  Risks and benefits: risks, benefits and alternatives were discussed  Consent given by: patient  Time out: Immediately prior to procedure a \"time out\" was called to verify the correct patient, procedure, equipment, support staff and site/side marked as required  Timeout called at: 5/9/2023 2:42 PM   Patient understanding: patient states understanding of the procedure being performed  Site marked: the operative site was marked  Patient identity confirmed: verbally with patient    Supporting Documentation  Indications: pain   Procedure Details  Location: knee - L knee  Preparation: Patient was prepped and draped in the usual sterile fashion  Needle size: 22 G  Ultrasound guidance: no  Approach: anterolateral  Medications administered: 2 mL methylPREDNISolone acetate 40 mg/mL; 1 mL bupivacaine 0 25 %; 30 mg ketorolac 30 mg/mL    Patient tolerance: patient tolerated the procedure well with no immediate complications  Dressing:  Sterile dressing applied            Portions of the record may have been created with voice recognition software  Occasional wrong word or \"sound a like\" substitutions may have occurred due to the inherent limitations of voice recognition software    Read the chart carefully and recognize, using context, where " substitutions have occurred

## 2023-07-27 ENCOUNTER — APPOINTMENT (OUTPATIENT)
Dept: LAB | Facility: MEDICAL CENTER | Age: 73
End: 2023-07-27
Payer: MEDICARE

## 2023-07-27 DIAGNOSIS — E78.2 MIXED HYPERLIPIDEMIA: ICD-10-CM

## 2023-07-27 DIAGNOSIS — I10 ESSENTIAL HYPERTENSION, BENIGN: ICD-10-CM

## 2023-07-27 LAB
T3 SERPL-MCNC: 0.9 NG/ML
T4 FREE SERPL-MCNC: 1.28 NG/DL (ref 0.61–1.12)
TSH SERPL DL<=0.05 MIU/L-ACNC: 0.43 UIU/ML (ref 0.45–4.5)

## 2023-07-27 PROCEDURE — 36415 COLL VENOUS BLD VENIPUNCTURE: CPT

## 2023-07-27 PROCEDURE — 84480 ASSAY TRIIODOTHYRONINE (T3): CPT

## 2023-07-27 PROCEDURE — 84439 ASSAY OF FREE THYROXINE: CPT

## 2023-07-27 PROCEDURE — 84443 ASSAY THYROID STIM HORMONE: CPT

## 2023-08-15 ENCOUNTER — OFFICE VISIT (OUTPATIENT)
Dept: OBGYN CLINIC | Facility: CLINIC | Age: 73
End: 2023-08-15
Payer: MEDICARE

## 2023-08-15 VITALS
SYSTOLIC BLOOD PRESSURE: 120 MMHG | OXYGEN SATURATION: 100 % | HEART RATE: 62 BPM | DIASTOLIC BLOOD PRESSURE: 76 MMHG | WEIGHT: 210 LBS | BODY MASS INDEX: 37.21 KG/M2 | HEIGHT: 63 IN

## 2023-08-15 DIAGNOSIS — M70.61 GREATER TROCHANTERIC BURSITIS OF RIGHT HIP: Primary | ICD-10-CM

## 2023-08-15 PROCEDURE — 20610 DRAIN/INJ JOINT/BURSA W/O US: CPT | Performed by: ORTHOPAEDIC SURGERY

## 2023-08-15 PROCEDURE — 99213 OFFICE O/P EST LOW 20 MIN: CPT | Performed by: ORTHOPAEDIC SURGERY

## 2023-08-15 RX ORDER — BUPIVACAINE HYDROCHLORIDE 2.5 MG/ML
2 INJECTION, SOLUTION INFILTRATION; PERINEURAL
Status: COMPLETED | OUTPATIENT
Start: 2023-08-15 | End: 2023-08-15

## 2023-08-15 RX ORDER — METHYLPREDNISOLONE ACETATE 40 MG/ML
2 INJECTION, SUSPENSION INTRA-ARTICULAR; INTRALESIONAL; INTRAMUSCULAR; SOFT TISSUE
Status: COMPLETED | OUTPATIENT
Start: 2023-08-15 | End: 2023-08-15

## 2023-08-15 RX ORDER — KETOROLAC TROMETHAMINE 30 MG/ML
30 INJECTION, SOLUTION INTRAMUSCULAR; INTRAVENOUS
Status: COMPLETED | OUTPATIENT
Start: 2023-08-15 | End: 2023-08-15

## 2023-08-15 RX ORDER — METOPROLOL SUCCINATE 50 MG/1
TABLET, EXTENDED RELEASE ORAL
COMMUNITY
Start: 2023-08-11

## 2023-08-15 RX ORDER — LEVOTHYROXINE SODIUM 88 MCG
TABLET ORAL
COMMUNITY
Start: 2023-08-02

## 2023-08-15 RX ADMIN — KETOROLAC TROMETHAMINE 30 MG: 30 INJECTION, SOLUTION INTRAMUSCULAR; INTRAVENOUS at 13:00

## 2023-08-15 RX ADMIN — METHYLPREDNISOLONE ACETATE 2 ML: 40 INJECTION, SUSPENSION INTRA-ARTICULAR; INTRALESIONAL; INTRAMUSCULAR; SOFT TISSUE at 13:00

## 2023-08-15 RX ADMIN — BUPIVACAINE HYDROCHLORIDE 2 ML: 2.5 INJECTION, SOLUTION INFILTRATION; PERINEURAL at 13:00

## 2023-08-15 NOTE — PROGRESS NOTES
Orthopedics          [facility] 68 y.o. female MRN: [id number]      Chief Complaint:   Right laterally based hip pain    HPI:   68 y. o.female complaining of right laterally based hip pain. Patient states she has had laterally based hip pain of her right hip for the past few months time. She has had treatments on the lumbar spine without pain relief. States pain is worse with weightbearing worse with laying on her right side. She denies any right hip groin pain she does have a history of right total arthroplasty done greater than 5 years ago by another surgeon. She denies any instability of her right hip. Patient has previously been treated for left knee pain due to osteoarthritis she did receive an injection 1 month ago which alleviated her pain. Review Of Systems:   · Skin: Normal  · Neuro: See HPI  · Musculoskeletal: See HPI  · All other systems reviewed and are negative    Past Medical History:   Past Medical History:   Diagnosis Date   • Anxiety    • Fibromyalgia    • H/O cardiovascular stress test 2018   • H/O echocardiogram 2017   • HBP (high blood pressure)    • Hypothyroidism    • IBS (irritable bowel syndrome)    • Vascular disease     Varicose veins       Past Surgical History:   Past Surgical History:   Procedure Laterality Date   • CARDIAC CATHETERIZATION      EF 50%. No significant CAD.    •  SECTION     • CHOLECYSTECTOMY     • HERNIA REPAIR     • REPLACEMENT TOTAL KNEE Right    • TOTAL HIP ARTHROPLASTY Right 2019   • VEIN SURGERY Bilateral      Dr Emily Martin       Family History:  Family history reviewed and non-contributory  Family History   Problem Relation Age of Onset   • Cancer Mother    • Heart failure Mother    • Heart disease Mother    • Lung cancer Father    • Hypertension Sister    • Breast cancer Sister         over 48   • No Known Problems Sister    • No Known Problems Sister    • No Known Problems Sister    • Lung cancer Sister    • Cancer Sister    • Skin cancer Sister    • No Known Problems Daughter    • No Known Problems Maternal Grandmother    • No Known Problems Maternal Grandfather    • No Known Problems Paternal Grandmother    • No Known Problems Paternal Grandfather    • Hypertension Brother    • Heart disease Brother    • Cancer Brother    • Prostate cancer Brother    • No Known Problems Brother    • No Known Problems Brother    • No Known Problems Brother    • No Known Problems Son    • No Known Problems Son    • Coronary artery disease Other          Social History:  Social History     Socioeconomic History   • Marital status: /Civil Union     Spouse name: None   • Number of children: None   • Years of education: None   • Highest education level: None   Occupational History   • Occupation: Retired   Tobacco Use   • Smoking status: Former   • Smokeless tobacco: Never   • Tobacco comments:     pt quit in her 35s   Vaping Use   • Vaping Use: Never used   Substance and Sexual Activity   • Alcohol use: No   • Drug use: No   • Sexual activity: Not Currently     Birth control/protection: Post-menopausal   Other Topics Concern   • None   Social History Narrative    Most recent tobacco use screening: 10-    Alcohol intake: None    Marital status:     Live alone or with others: with others    Occupation: retired     Social Determinants of Health     Financial Resource Strain: Not on file   Food Insecurity: Not on file   Transportation Needs: Not on file   Physical Activity: Not on file   Stress: Not on file   Social Connections: Not on file   Intimate Partner Violence: Not on file   Housing Stability: Not on file       Allergies:    Allergies   Allergen Reactions   • Escitalopram Diarrhea and GI Intolerance   • Food Throat Swelling     FAT FREE SALAD DRESSING   • Hydrocodone-Acetaminophen Other (See Comments)     Heat intolerance, flushing   • Lactose Intolerance [Tilactase] Abdominal Pain   • Meloxicam Other (See Comments) Other reaction(s): nose bleeds   • Methylprednisolone Other (See Comments)     Red face   • Niacin Facial Swelling     Other reaction(s): Unknown Allergic Reaction   • Norco [Hydrocodone-Acetaminophen] Other (See Comments)     Heat intolerance, flushing   • Gabapentin Anxiety       Labs:  0   Lab Value Date/Time    HCT 40.2 07/27/2023 0751    HCT 39.4 03/08/2023 1308    HCT 39.9 10/21/2022 1500    HCT 40.2 11/21/2015 0509    HCT 41.4 11/20/2015 1729    HGB 13.0 07/27/2023 0751    HGB 12.9 03/08/2023 1308    HGB 13.2 10/21/2022 1500    HGB 13.1 11/21/2015 0509    HGB 14.0 11/20/2015 1729    INR 1.09 11/20/2015 1729    WBC 5.17 07/27/2023 0751    WBC 7.08 03/08/2023 1308    WBC 6.28 10/21/2022 1500    WBC 5.44 11/21/2015 0509    WBC 6.43 11/20/2015 1729    ESR 27 07/27/2023 0751    CRP <3.0 03/08/2023 1308       Meds:    Current Outpatient Medications:   •  dexlansoprazole (Dexilant) 60 MG capsule, Take 60 mg by mouth daily, Disp: , Rfl:   •  hydrochlorothiazide (HYDRODIURIL) 25 mg tablet, TAKE 1 TABLET (25 MG TOTAL) BY MOUTH 2 (TWO) TIMES A WEEK., Disp: , Rfl:   •  latanoprost (XALATAN) 0.005 % ophthalmic solution, 1 drop daily at bedtime, Disp: , Rfl:   •  Linzess 72 MCG CAPS, 72 mcg daily in the early morning , Disp: , Rfl:   •  losartan (COZAAR) 100 MG tablet, Take 100 mg by mouth daily, Disp: , Rfl:   •  metoprolol succinate (TOPROL-XL) 50 mg 24 hr tablet, , Disp: , Rfl:   •  Synthroid 88 MCG tablet, , Disp: , Rfl:   •  amoxicillin (AMOXIL) 500 mg capsule, Take 2,000 mg by mouth 60 minutes pre-procedure (Patient not taking: Reported on 3/28/2023), Disp: , Rfl:   •  esomeprazole (NexIUM) 20 mg capsule, Take 20 mg by mouth every morning before breakfast (Patient not taking: Reported on 3/28/2023), Disp: , Rfl:   •  famotidine (PEPCID) 20 mg tablet, , Disp: , Rfl:   •  losartan-hydrochlorothiazide (HYZAAR) 100-25 MG per tablet, Take 1 tablet by mouth as needed (Patient not taking: Reported on 10/21/2022), Disp: , Rfl:   •  metoprolol tartrate (LOPRESSOR) 50 mg tablet, Take 50 mg by mouth every 12 (twelve) hours (Patient not taking: Reported on 8/15/2023), Disp: , Rfl:       Physical Exam:   Vitals:    08/15/23 1308   BP: 120/76   Pulse: 62   SpO2: 100%       General Appearance:    Alert, cooperative, no distress, appears stated age   Head:    Normocephalic, without obvious abnormality, atraumatic   Eyes:    conjunctiva/corneas clear, both eyes        Nose:   Nares normal, septum midline, no drainage    Throat:   Lips normal; teeth and gums normal   Neck:    symmetrical, trachea midline, ;     thyroid:  no enlargement/   Back:     Symmetric, no curvature, ROM normal   Lungs:   No audible wheezing or labored breathing   Chest Wall:    No tenderness or deformity    Heart:    Regular rate and rhythm               Pulses:   2+ and symmetric all extremities   Skin:   Skin color, texture, turgor normal, no rashes or lesions   Neurologic:   normal strength, sensation and reflexes     throughout       Musculoskeletal: right lower extremity  · Examination of her right hip no gross deformity no erythema posterior lateral incision well healed scar noted active flexion 90 degrees pain palpation of the right hip greater trochanteric region positive Lauren's test negative modified straight leg raise test hip flexion abduction abduction strength 5 out of 5 pain with resisted abduction full knee extension active ankle dorsi plantarflexion sensation intact as well as present    Large joint arthrocentesis: R greater trochanteric bursa  Universal Protocol:  Consent: Verbal consent obtained.   Consent given by: patient  Patient understanding: patient states understanding of the procedure being performed  Site marked: the operative site was marked  Patient identity confirmed: verbally with patient    Supporting Documentation  Indications: pain   Procedure Details  Location: hip - R greater trochanteric bursa  Needle size: 22 G  Approach: lateral  Medications administered: 2 mL bupivacaine 0.25 %; 2 mL methylPREDNISolone acetate 40 mg/mL; 30 mg ketorolac 30 mg/mL    Patient tolerance: patient tolerated the procedure well with no immediate complications          _*_*_*_*_*_*_*_*_*_*_*_*_*_*_*_*_*_*_*_*_*_*_*_*_*_*_*_*_*_*_*_*_*_*_*_*_*_*_*_*_*    Assessment:  68 y. o.female with right hip greater trochanteric bursitis    Plan:   · Weight bearing as tolerated  right lower extremity  · Right hip greater trochanteric corticosteroid injection and Toradol administered as noted above  · Patient advised should they develop any increasing pain, redness, drainage, numbness, tingling or swelling surrounding the injection site, they are to contact our office or present to the emergency department.   · Case discussed with Dr. Abelino June  · Follow up in 2 months or sooner if needed for reevaluation and possible injection of her left knee pain due to arthritis      Marj Andrade PA-C

## 2023-09-08 ENCOUNTER — APPOINTMENT (OUTPATIENT)
Dept: LAB | Facility: MEDICAL CENTER | Age: 73
End: 2023-09-08
Payer: MEDICARE

## 2023-09-08 DIAGNOSIS — M81.0 SENILE OSTEOPOROSIS: ICD-10-CM

## 2023-09-08 LAB
ANION GAP SERPL CALCULATED.3IONS-SCNC: 7 MMOL/L
BUN SERPL-MCNC: 19 MG/DL (ref 5–25)
CALCIUM SERPL-MCNC: 9.9 MG/DL (ref 8.4–10.2)
CHLORIDE SERPL-SCNC: 104 MMOL/L (ref 96–108)
CO2 SERPL-SCNC: 29 MMOL/L (ref 21–32)
CREAT SERPL-MCNC: 0.62 MG/DL (ref 0.6–1.3)
GFR SERPL CREATININE-BSD FRML MDRD: 89 ML/MIN/1.73SQ M
GLUCOSE P FAST SERPL-MCNC: 91 MG/DL (ref 65–99)
POTASSIUM SERPL-SCNC: 4.1 MMOL/L (ref 3.5–5.3)
SODIUM SERPL-SCNC: 140 MMOL/L (ref 135–147)

## 2023-09-08 PROCEDURE — 36415 COLL VENOUS BLD VENIPUNCTURE: CPT

## 2023-09-08 PROCEDURE — 80048 BASIC METABOLIC PNL TOTAL CA: CPT

## 2023-10-24 ENCOUNTER — OFFICE VISIT (OUTPATIENT)
Dept: OBGYN CLINIC | Facility: CLINIC | Age: 73
End: 2023-10-24
Payer: MEDICARE

## 2023-10-24 VITALS
DIASTOLIC BLOOD PRESSURE: 82 MMHG | WEIGHT: 210 LBS | HEART RATE: 79 BPM | BODY MASS INDEX: 38.64 KG/M2 | HEIGHT: 62 IN | SYSTOLIC BLOOD PRESSURE: 120 MMHG

## 2023-10-24 DIAGNOSIS — M17.12 PRIMARY OSTEOARTHRITIS OF LEFT KNEE: Primary | ICD-10-CM

## 2023-10-24 DIAGNOSIS — M70.50 PES ANSERINE BURSITIS: ICD-10-CM

## 2023-10-24 PROCEDURE — 99213 OFFICE O/P EST LOW 20 MIN: CPT | Performed by: ORTHOPAEDIC SURGERY

## 2023-10-24 PROCEDURE — 20610 DRAIN/INJ JOINT/BURSA W/O US: CPT | Performed by: ORTHOPAEDIC SURGERY

## 2023-10-24 RX ORDER — BUPIVACAINE HYDROCHLORIDE 2.5 MG/ML
1 INJECTION, SOLUTION INFILTRATION; PERINEURAL
Status: COMPLETED | OUTPATIENT
Start: 2023-10-24 | End: 2023-10-24

## 2023-10-24 RX ORDER — KETOROLAC TROMETHAMINE 30 MG/ML
30 INJECTION, SOLUTION INTRAMUSCULAR; INTRAVENOUS
Status: COMPLETED | OUTPATIENT
Start: 2023-10-24 | End: 2023-10-24

## 2023-10-24 RX ORDER — BETAMETHASONE SODIUM PHOSPHATE AND BETAMETHASONE ACETATE 3; 3 MG/ML; MG/ML
12 INJECTION, SUSPENSION INTRA-ARTICULAR; INTRALESIONAL; INTRAMUSCULAR; SOFT TISSUE
Status: COMPLETED | OUTPATIENT
Start: 2023-10-24 | End: 2023-10-24

## 2023-10-24 RX ORDER — LIDOCAINE HYDROCHLORIDE 10 MG/ML
2 INJECTION, SOLUTION INFILTRATION; PERINEURAL
Status: COMPLETED | OUTPATIENT
Start: 2023-10-24 | End: 2023-10-24

## 2023-10-24 RX ORDER — METHYLPREDNISOLONE ACETATE 40 MG/ML
2 INJECTION, SUSPENSION INTRA-ARTICULAR; INTRALESIONAL; INTRAMUSCULAR; SOFT TISSUE
Status: COMPLETED | OUTPATIENT
Start: 2023-10-24 | End: 2023-10-24

## 2023-10-24 RX ORDER — BUPIVACAINE HYDROCHLORIDE 2.5 MG/ML
2 INJECTION, SOLUTION INFILTRATION; PERINEURAL
Status: COMPLETED | OUTPATIENT
Start: 2023-10-24 | End: 2023-10-24

## 2023-10-24 RX ADMIN — LIDOCAINE HYDROCHLORIDE 2 ML: 10 INJECTION, SOLUTION INFILTRATION; PERINEURAL at 13:00

## 2023-10-24 RX ADMIN — KETOROLAC TROMETHAMINE 30 MG: 30 INJECTION, SOLUTION INTRAMUSCULAR; INTRAVENOUS at 13:00

## 2023-10-24 RX ADMIN — METHYLPREDNISOLONE ACETATE 2 ML: 40 INJECTION, SUSPENSION INTRA-ARTICULAR; INTRALESIONAL; INTRAMUSCULAR; SOFT TISSUE at 13:00

## 2023-10-24 RX ADMIN — BETAMETHASONE SODIUM PHOSPHATE AND BETAMETHASONE ACETATE 12 MG: 3; 3 INJECTION, SUSPENSION INTRA-ARTICULAR; INTRALESIONAL; INTRAMUSCULAR; SOFT TISSUE at 13:00

## 2023-10-24 RX ADMIN — BUPIVACAINE HYDROCHLORIDE 1 ML: 2.5 INJECTION, SOLUTION INFILTRATION; PERINEURAL at 13:00

## 2023-10-24 RX ADMIN — BUPIVACAINE HYDROCHLORIDE 2 ML: 2.5 INJECTION, SOLUTION INFILTRATION; PERINEURAL at 13:00

## 2023-10-24 NOTE — PROGRESS NOTES
Assessment:  1. Primary osteoarthritis of left knee  Large joint arthrocentesis: L knee      2. Pes anserine bursitis  Large joint arthrocentesis: L pes anserine bursa        70-year-old female with left knee osteoarthritis and pes anserine bursitis. Plan:    Risks and benefits of non-operative and operative treatment modalities for left knee osteoarthritis were reviewed and explained to the patient. Understanding treatment options patient wished to continue with conservative modalities  Corticosteroid injection to the left knee and left pes anserine bursa was performed as noted below  Ice and elevation for soft tissue swelling as needed  Over-the-counter anti-inflammatories as needed for pain control  Continue home exercise program  If corticosteroid injections fail to improve symptoms, will discuss total knee arthroplasty at follow-up visit  Follow-up in 3 months for recheck  Obtain x-rays of the left knee x-ray (standing views)        The above stated was discussed in layman's terms and the patient expressed understanding. All questions were answered to the patient's satisfaction. Subjective:   Brandon Hagen is a 68 y.o. female who presents for follow up regarding chronic left knee pain secondary to known osteoarthritis. She has been undergoing conservative treatment for her left knee pain with periodic corticosteroid injections. She last received a left knee corticosteroid injection on 7/23 and reports symptomatic relief that lasted approximately 2 months. She reports continued left medial knee pain that is worse with activity and relieved by rest.  Occasionally she will get associated swelling of her knee with increased activity. Additionally she describes a shooting pain over the anterior medial aspect of her proximal tibia that is exacerbated with knee flexion when walking upstairs.   She states that she consistently does home exercises that were given to her by physical therapy on a daily basis. Review of systems negative unless otherwise specified in HPI    Past Medical History:   Diagnosis Date    Anxiety     Fibromyalgia     H/O cardiovascular stress test 2018    H/O echocardiogram 2017    HBP (high blood pressure)     Hypothyroidism     IBS (irritable bowel syndrome)     Vascular disease     Varicose veins       Past Surgical History:   Procedure Laterality Date    CARDIAC CATHETERIZATION      EF 50%. No significant CAD.      SECTION      CHOLECYSTECTOMY      HERNIA REPAIR  2010    REPLACEMENT TOTAL KNEE Right 2013    TOTAL HIP ARTHROPLASTY Right 2019    VEIN SURGERY Bilateral      Dr Abilio Mullins       Family History   Problem Relation Age of Onset    Cancer Mother     Heart failure Mother     Heart disease Mother     Lung cancer Father     Hypertension Sister     Breast cancer Sister         over 48    No Known Problems Sister     No Known Problems Sister     No Known Problems Sister     Lung cancer Sister     Cancer Sister     Skin cancer Sister     No Known Problems Daughter     No Known Problems Maternal Grandmother     No Known Problems Maternal Grandfather     No Known Problems Paternal Grandmother     No Known Problems Paternal Grandfather     Hypertension Brother     Heart disease Brother     Cancer Brother     Prostate cancer Brother     No Known Problems Brother     No Known Problems Brother     No Known Problems Brother     No Known Problems Son     No Known Problems Son     Coronary artery disease Other        Social History     Occupational History    Occupation: Retired   Tobacco Use    Smoking status: Former    Smokeless tobacco: Never    Tobacco comments:     pt quit in her 35s   Vaping Use    Vaping Use: Never used   Substance and Sexual Activity    Alcohol use: No    Drug use: No    Sexual activity: Not Currently     Birth control/protection: Post-menopausal         Current Outpatient Medications:     hydrochlorothiazide (HYDRODIURIL) 25 mg tablet, TAKE 1 TABLET (25 MG TOTAL) BY MOUTH 2 (TWO) TIMES A WEEK., Disp: , Rfl:     latanoprost (XALATAN) 0.005 % ophthalmic solution, 1 drop daily at bedtime, Disp: , Rfl:     Linzess 72 MCG CAPS, 72 mcg daily in the early morning , Disp: , Rfl:     losartan (COZAAR) 100 MG tablet, Take 100 mg by mouth daily, Disp: , Rfl:     metoprolol succinate (TOPROL-XL) 50 mg 24 hr tablet, , Disp: , Rfl:     Synthroid 88 MCG tablet, , Disp: , Rfl:     amoxicillin (AMOXIL) 500 mg capsule, Take 2,000 mg by mouth 60 minutes pre-procedure (Patient not taking: Reported on 3/28/2023), Disp: , Rfl:     dexlansoprazole (Dexilant) 60 MG capsule, Take 60 mg by mouth daily, Disp: , Rfl:     esomeprazole (NexIUM) 20 mg capsule, Take 20 mg by mouth every morning before breakfast (Patient not taking: Reported on 3/28/2023), Disp: , Rfl:     famotidine (PEPCID) 20 mg tablet, , Disp: , Rfl:     losartan-hydrochlorothiazide (HYZAAR) 100-25 MG per tablet, Take 1 tablet by mouth as needed (Patient not taking: Reported on 10/21/2022), Disp: , Rfl:     metoprolol tartrate (LOPRESSOR) 50 mg tablet, Take 50 mg by mouth every 12 (twelve) hours (Patient not taking: Reported on 8/15/2023), Disp: , Rfl:     Allergies   Allergen Reactions    Escitalopram Diarrhea and GI Intolerance    Food Throat Swelling     FAT FREE SALAD DRESSING    Hydrocodone-Acetaminophen Other (See Comments)     Heat intolerance, flushing    Lactose Intolerance [Tilactase] Abdominal Pain    Meloxicam Other (See Comments)     Other reaction(s): nose bleeds    Methylprednisolone Other (See Comments)     Red face    Niacin Facial Swelling     Other reaction(s): Unknown Allergic Reaction    Norco [Hydrocodone-Acetaminophen] Other (See Comments)     Heat intolerance, flushing    Gabapentin Anxiety            Vitals:    10/24/23 1258   BP: 120/82   Pulse: 79       Objective:            Physical Exam  Physical Exam:      General Appearance:    Alert, cooperative, no distress, appears stated age   Head: Normocephalic, without obvious abnormality, atraumatic   Eyes:    conjunctiva/corneas clear, both eyes         Nose:   Nares normal, septum midline, no drainage    Throat:   Lips normal; teeth and gums normal   Neck:    symmetrical, trachea midline, ;     thyroid:  no enlargement/   Back:     Symmetric, no curvature, ROM normal   Lungs:   No audible wheezing or labored breathing   Chest Wall:    No tenderness or deformity    Heart:    Regular rate and rhythm                         Pulses:   2+ and symmetric all extremities   Skin:   Skin color, texture, turgor normal, no rashes or lesions   Neurologic:   normal strength, sensation and reflexes     throughout                       Left Knee Exam     Tenderness   The patient is experiencing tenderness in the medial joint line and pes anserinus. Range of Motion   Extension:  normal   Flexion:  normal     Tests   Varus: negative Valgus: negative    Other   Erythema: absent  Scars: absent  Sensation: normal  Pulse: present  Swelling: none  Effusion: no effusion present    Comments:  Negative patella grind test               Neurovascularly Intact Distally     Diagnostics, reviewed and taken today if performed as documented:    None performed      Procedures, if performed today:    Large joint arthrocentesis: L knee  Universal Protocol:  Consent: Verbal consent obtained.   Risks and benefits: risks, benefits and alternatives were discussed  Consent given by: patient  Patient identity confirmed: verbally with patient  Supporting Documentation  Indications: pain   Procedure Details  Location: knee - L knee  Preparation: Patient was prepped and draped in the usual sterile fashion  Needle size: 22 G  Ultrasound guidance: no  Approach: anterolateral  Medications administered: 1 mL bupivacaine 0.25 %; 2 mL methylPREDNISolone acetate 40 mg/mL; 30 mg ketorolac 60 mg/2 mL    Patient tolerance: patient tolerated the procedure well with no immediate complications  Dressing: Sterile dressing applied      Large joint arthrocentesis: L pes anserine bursa  Universal Protocol:  Consent: Verbal consent obtained. Risks and benefits: risks, benefits and alternatives were discussed  Consent given by: patient  Patient identity confirmed: verbally with patient  Supporting Documentation  Indications: pain   Procedure Details  Location: knee - L pes anserine bursa  Preparation: Patient was prepped and draped in the usual sterile fashion  Needle size: 22 G  Ultrasound guidance: no  Approach: anterolateral  Medications administered: 2 mL lidocaine 1 %; 2 mL bupivacaine 0.25 %; 12 mg betamethasone acetate-betamethasone sodium phosphate 6 (3-3) mg/mL    Patient tolerance: patient tolerated the procedure well with no immediate complications  Dressing:  Sterile dressing applied          Scribe Attestation      I,:   am acting as a scribe while in the presence of the attending physician.:       I,:   personally performed the services described in this documentation    as scribed in my presence.:               Portions of the record may have been created with voice recognition software. Occasional wrong word or "sound a like" substitutions may have occurred due to the inherent limitations of voice recognition software. Read the chart carefully and recognize, using context, where substitutions have occurred.

## 2024-01-23 ENCOUNTER — HOSPITAL ENCOUNTER (OUTPATIENT)
Dept: RADIOLOGY | Facility: HOSPITAL | Age: 74
Discharge: HOME | End: 2024-01-23
Attending: ORTHOPAEDIC SURGERY

## 2024-01-23 ENCOUNTER — OFFICE VISIT (OUTPATIENT)
Dept: OBGYN CLINIC | Facility: CLINIC | Age: 74
End: 2024-01-23
Payer: MEDICARE

## 2024-01-23 VITALS
BODY MASS INDEX: 38.64 KG/M2 | HEART RATE: 80 BPM | HEIGHT: 62 IN | SYSTOLIC BLOOD PRESSURE: 132 MMHG | WEIGHT: 210 LBS | DIASTOLIC BLOOD PRESSURE: 84 MMHG

## 2024-01-23 DIAGNOSIS — M17.12 PRIMARY OSTEOARTHRITIS OF LEFT KNEE: ICD-10-CM

## 2024-01-23 DIAGNOSIS — M70.50 PES ANSERINE BURSITIS: ICD-10-CM

## 2024-01-23 DIAGNOSIS — M17.12 PRIMARY OSTEOARTHRITIS OF LEFT KNEE: Primary | ICD-10-CM

## 2024-01-23 DIAGNOSIS — E66.01 MORBID OBESITY DUE TO EXCESS CALORIES (HCC): ICD-10-CM

## 2024-01-23 PROCEDURE — 99214 OFFICE O/P EST MOD 30 MIN: CPT | Performed by: ORTHOPAEDIC SURGERY

## 2024-01-23 PROCEDURE — 20610 DRAIN/INJ JOINT/BURSA W/O US: CPT | Performed by: ORTHOPAEDIC SURGERY

## 2024-01-23 RX ORDER — METHYLPREDNISOLONE ACETATE 40 MG/ML
2 INJECTION, SUSPENSION INTRA-ARTICULAR; INTRALESIONAL; INTRAMUSCULAR; SOFT TISSUE
Status: COMPLETED | OUTPATIENT
Start: 2024-01-23 | End: 2024-01-23

## 2024-01-23 RX ORDER — KETOROLAC TROMETHAMINE 30 MG/ML
60 INJECTION, SOLUTION INTRAMUSCULAR; INTRAVENOUS
Status: COMPLETED | OUTPATIENT
Start: 2024-01-23 | End: 2024-01-23

## 2024-01-23 RX ORDER — BUPIVACAINE HYDROCHLORIDE 2.5 MG/ML
1 INJECTION, SOLUTION INFILTRATION; PERINEURAL
Status: COMPLETED | OUTPATIENT
Start: 2024-01-23 | End: 2024-01-23

## 2024-01-23 RX ADMIN — KETOROLAC TROMETHAMINE 60 MG: 30 INJECTION, SOLUTION INTRAMUSCULAR; INTRAVENOUS at 13:00

## 2024-01-23 RX ADMIN — METHYLPREDNISOLONE ACETATE 2 ML: 40 INJECTION, SUSPENSION INTRA-ARTICULAR; INTRALESIONAL; INTRAMUSCULAR; SOFT TISSUE at 13:00

## 2024-01-23 RX ADMIN — BUPIVACAINE HYDROCHLORIDE 1 ML: 2.5 INJECTION, SOLUTION INFILTRATION; PERINEURAL at 13:00

## 2024-01-23 NOTE — PROGRESS NOTES
Assessment:  1. Primary osteoarthritis of left knee  Large joint arthrocentesis: L knee    CANCELED: XR knee 3 vw left non injury      2. Pes anserine bursitis  Large joint arthrocentesis: L anserine bursa          Plan:    Patient has chronic left knee pain due to severe osteoarthritis, and pes anserine bursitis  Weight-bear as tolerated  Patient received a left knee and a pes anserine bursitis steroid injections in the office today  Continue with home exercise from physical therapy  Follow-up in 3 months for repeat injections        The above stated was discussed in layman's terms and the patient expressed understanding.  All questions were answered to the patient's satisfaction.         Subjective:   Heather Holt is a 73 y.o. female who presents today follow up for chronic left knee pain due to osteoarthritis. She had a left knee steroid injection on 10/24/23 and states she had relief up until most recently.  She is having pain over the medial and anterior aspect of the left knee.  She states her pain is worse with weightbearing and increased activities.  She denies any numbness or tingling. She would like to avoid surgical intervention at this time      Review of systems negative unless otherwise specified in HPI    Past Medical History:   Diagnosis Date    Anxiety     Fibromyalgia     H/O cardiovascular stress test 2018    H/O echocardiogram 2017    HBP (high blood pressure)     Hypothyroidism     IBS (irritable bowel syndrome)     Vascular disease     Varicose veins       Past Surgical History:   Procedure Laterality Date    CARDIAC CATHETERIZATION      EF 50%. No significant CAD.     SECTION      CHOLECYSTECTOMY      HERNIA REPAIR  2010    REPLACEMENT TOTAL KNEE Right 2013    TOTAL HIP ARTHROPLASTY Right 2019    VEIN SURGERY Bilateral      Dr Castrejon       Family History   Problem Relation Age of Onset    Cancer Mother     Heart failure Mother     Heart disease Mother     Lung cancer  Father     Hypertension Sister     Breast cancer Sister         over 50    No Known Problems Sister     No Known Problems Sister     No Known Problems Sister     Lung cancer Sister     Cancer Sister     Skin cancer Sister     No Known Problems Daughter     No Known Problems Maternal Grandmother     No Known Problems Maternal Grandfather     No Known Problems Paternal Grandmother     No Known Problems Paternal Grandfather     Hypertension Brother     Heart disease Brother     Cancer Brother     Prostate cancer Brother     No Known Problems Brother     No Known Problems Brother     No Known Problems Brother     No Known Problems Son     No Known Problems Son     Coronary artery disease Other        Social History     Occupational History    Occupation: Retired   Tobacco Use    Smoking status: Former    Smokeless tobacco: Never    Tobacco comments:     pt quit in her 30s   Vaping Use    Vaping status: Never Used   Substance and Sexual Activity    Alcohol use: No    Drug use: No    Sexual activity: Not Currently     Birth control/protection: Post-menopausal         Current Outpatient Medications:     amoxicillin (AMOXIL) 500 mg capsule, Take 2,000 mg by mouth 60 minutes pre-procedure (Patient not taking: Reported on 3/28/2023), Disp: , Rfl:     dexlansoprazole (Dexilant) 60 MG capsule, Take 60 mg by mouth daily, Disp: , Rfl:     esomeprazole (NexIUM) 20 mg capsule, Take 20 mg by mouth every morning before breakfast (Patient not taking: Reported on 3/28/2023), Disp: , Rfl:     famotidine (PEPCID) 20 mg tablet, , Disp: , Rfl:     hydrochlorothiazide (HYDRODIURIL) 25 mg tablet, TAKE 1 TABLET (25 MG TOTAL) BY MOUTH 2 (TWO) TIMES A WEEK., Disp: , Rfl:     latanoprost (XALATAN) 0.005 % ophthalmic solution, 1 drop daily at bedtime, Disp: , Rfl:     Linzess 72 MCG CAPS, 72 mcg daily in the early morning , Disp: , Rfl:     losartan (COZAAR) 100 MG tablet, Take 100 mg by mouth daily, Disp: , Rfl:     losartan-hydrochlorothiazide  (HYZAAR) 100-25 MG per tablet, Take 1 tablet by mouth as needed (Patient not taking: Reported on 10/21/2022), Disp: , Rfl:     metoprolol succinate (TOPROL-XL) 50 mg 24 hr tablet, , Disp: , Rfl:     metoprolol tartrate (LOPRESSOR) 50 mg tablet, Take 50 mg by mouth every 12 (twelve) hours (Patient not taking: Reported on 8/15/2023), Disp: , Rfl:     Synthroid 88 MCG tablet, , Disp: , Rfl:     Allergies   Allergen Reactions    Escitalopram Diarrhea and GI Intolerance    Food Throat Swelling     FAT FREE SALAD DRESSING    Hydrocodone-Acetaminophen Other (See Comments)     Heat intolerance, flushing    Lactose Intolerance [Tilactase] Abdominal Pain    Meloxicam Other (See Comments)     Other reaction(s): nose bleeds    Methylprednisolone Other (See Comments)     Red face    Niacin Facial Swelling     Other reaction(s): Unknown Allergic Reaction    Norco [Hydrocodone-Acetaminophen] Other (See Comments)     Heat intolerance, flushing    Gabapentin Anxiety            Vitals:    01/23/24 1321   BP: 132/84   Pulse: 80     Body mass index is 38.41 kg/m².  Wt Readings from Last 3 Encounters:   01/23/24 95.3 kg (210 lb)   10/24/23 95.3 kg (210 lb)   08/15/23 95.3 kg (210 lb)       Objective:            Physical Exam  Physical Exam:      General Appearance:    Alert, cooperative, no distress, appears stated age   Head:    Normocephalic, without obvious abnormality, atraumatic   Eyes:    conjunctiva/corneas clear, both eyes         Nose:   Nares normal, septum midline, no drainage    Throat:   Lips normal; teeth and gums normal   Neck:    symmetrical, trachea midline, ;     thyroid:  no enlargement/   Back:     Symmetric, no curvature, ROM normal   Lungs:   No audible wheezing or labored breathing   Chest Wall:    No tenderness or deformity    Heart:    Regular rate and rhythm                         Pulses:   2+ and symmetric all extremities   Skin:   Skin color, texture, turgor normal, no rashes or lesions   Neurologic:    "normal strength, sensation and reflexes     throughout                       Ortho Exam  Left Knee  Skin intact  No erythema or open wounds  Mild effusion  Tenderness palpation of medial joint line and pes anserine bursa  No lateral joint line tenderness  Near full extension  Full flexion  Patellar grind test +/-  Stable to varus and valgus stress  Negative posterior drawer  Negative Lachman test  Neurovascular intact distally     Diagnostics, reviewed and taken today if performed as documented:    None performed        Procedures, if performed today:    Large joint arthrocentesis: L knee  Universal Protocol:  Consent: Verbal consent obtained.  Risks and benefits: risks, benefits and alternatives were discussed  Consent given by: patient  Time out: Immediately prior to procedure a \"time out\" was called to verify the correct patient, procedure, equipment, support staff and site/side marked as required.  Timeout called at: 1/23/2024 1:42 PM.  Patient understanding: patient states understanding of the procedure being performed  Site marked: the operative site was marked  Supporting Documentation  Indications: pain   Procedure Details  Location: knee - L knee  Needle size: 22 G  Approach: lateral  Medications administered: 1 mL bupivacaine 0.25 %; 2 mL methylPREDNISolone acetate 40 mg/mL; 60 mg ketorolac 60 mg/2 mL    Patient tolerance: patient tolerated the procedure well with no immediate complications  Dressing:  Sterile dressing applied      Large joint arthrocentesis: L anserine bursa  Universal Protocol:  Consent: Verbal consent obtained.  Risks and benefits: risks, benefits and alternatives were discussed  Consent given by: patient  Time out: Immediately prior to procedure a \"time out\" was called to verify the correct patient, procedure, equipment, support staff and site/side marked as required.  Timeout called at: 1/23/2024 1:45 PM.  Patient understanding: patient states understanding of the procedure being " "performed  Site marked: the operative site was marked  Supporting Documentation  Indications: pain   Procedure Details  Location: knee - L anserine bursa  Preparation: Patient was prepped and draped in the usual sterile fashion  Needle size: 22 G  Approach: anteromedial  Medications administered: 1 mL bupivacaine 0.25 %; 2 mL methylPREDNISolone acetate 40 mg/mL    Patient tolerance: patient tolerated the procedure well with no immediate complications  Dressing:  Sterile dressing applied            Scribe Attestation      I,:  Isidra Mckoy am acting as a scribe while in the presence of the attending physician.:       I,:  Sonia Real MD personally performed the services described in this documentation    as scribed in my presence.:               Portions of the record may have been created with voice recognition software.  Occasional wrong word or \"sound a like\" substitutions may have occurred due to the inherent limitations of voice recognition software.  Read the chart carefully and recognize, using context, where substitutions have occurred.  "

## 2024-01-25 ENCOUNTER — APPOINTMENT (OUTPATIENT)
Dept: LAB | Facility: MEDICAL CENTER | Age: 74
End: 2024-01-25
Payer: MEDICARE

## 2024-04-02 ENCOUNTER — TELEPHONE (OUTPATIENT)
Age: 74
End: 2024-04-02

## 2024-04-02 NOTE — TELEPHONE ENCOUNTER
Patient called about her mammogram script and found out her previous gynecologist is not with our practice anymore. She is going to call her primary doctor to get the script and if she has any issue she will call us back and we can then ask one of our providers if they will order the script. Thank you

## 2024-04-08 ENCOUNTER — TELEPHONE (OUTPATIENT)
Age: 74
End: 2024-04-08

## 2024-04-08 DIAGNOSIS — Z12.31 ENCOUNTER FOR SCREENING MAMMOGRAM FOR MALIGNANT NEOPLASM OF BREAST: Primary | ICD-10-CM

## 2024-04-08 NOTE — TELEPHONE ENCOUNTER
[facility] called and patient does not have a script and she is scheduled on 4/10.  Her previous ob gyn doctor is not with our practice anymore.  Please advise. Thank you

## 2024-04-10 ENCOUNTER — HOSPITAL ENCOUNTER (OUTPATIENT)
Dept: RADIOLOGY | Facility: MEDICAL CENTER | Age: 74
Discharge: HOME | End: 2024-04-10
Payer: MEDICARE

## 2024-04-10 VITALS — HEIGHT: 63 IN | BODY MASS INDEX: 37.21 KG/M2 | WEIGHT: 210 LBS

## 2024-04-10 DIAGNOSIS — Z12.31 ENCOUNTER FOR SCREENING MAMMOGRAM FOR MALIGNANT NEOPLASM OF BREAST: ICD-10-CM

## 2024-04-10 PROCEDURE — 77063 BREAST TOMOSYNTHESIS BI: CPT

## 2024-04-10 PROCEDURE — 77067 SCR MAMMO BI INCL CAD: CPT

## 2024-04-23 ENCOUNTER — HOSPITAL ENCOUNTER (OUTPATIENT)
Dept: RADIOLOGY | Facility: HOSPITAL | Age: 74
Discharge: HOME | End: 2024-04-23
Attending: ORTHOPAEDIC SURGERY
Payer: MEDICARE

## 2024-04-23 ENCOUNTER — OFFICE VISIT (OUTPATIENT)
Dept: OBGYN CLINIC | Facility: CLINIC | Age: 74
End: 2024-04-23
Payer: MEDICARE

## 2024-04-23 VITALS
HEIGHT: 63 IN | WEIGHT: 210 LBS | BODY MASS INDEX: 37.21 KG/M2 | DIASTOLIC BLOOD PRESSURE: 92 MMHG | SYSTOLIC BLOOD PRESSURE: 123 MMHG

## 2024-04-23 DIAGNOSIS — M17.12 PRIMARY OSTEOARTHRITIS OF LEFT KNEE: ICD-10-CM

## 2024-04-23 DIAGNOSIS — Z01.818 PRE-OP TESTING: ICD-10-CM

## 2024-04-23 DIAGNOSIS — M25.552 LEFT HIP PAIN: Primary | ICD-10-CM

## 2024-04-23 DIAGNOSIS — M35.9 UNDIFFERENTIATED CONNECTIVE TISSUE DISEASE (HCC): ICD-10-CM

## 2024-04-23 DIAGNOSIS — M25.552 LEFT HIP PAIN: ICD-10-CM

## 2024-04-23 PROCEDURE — 73502 X-RAY EXAM HIP UNI 2-3 VIEWS: CPT

## 2024-04-23 PROCEDURE — 99214 OFFICE O/P EST MOD 30 MIN: CPT | Performed by: ORTHOPAEDIC SURGERY

## 2024-04-23 PROCEDURE — 73562 X-RAY EXAM OF KNEE 3: CPT

## 2024-04-23 RX ORDER — SODIUM CHLORIDE, SODIUM LACTATE, POTASSIUM CHLORIDE, CALCIUM CHLORIDE 600; 310; 30; 20 MG/100ML; MG/100ML; MG/100ML; MG/100ML
75 INJECTION, SOLUTION INTRAVENOUS CONTINUOUS
OUTPATIENT
Start: 2024-04-23

## 2024-04-23 RX ORDER — ASCORBIC ACID 500 MG
500 TABLET ORAL 2 TIMES DAILY
Qty: 60 TABLET | Refills: 1 | Status: SHIPPED | OUTPATIENT
Start: 2024-04-23

## 2024-04-23 RX ORDER — ACETAMINOPHEN 325 MG/1
975 TABLET ORAL ONCE
OUTPATIENT
Start: 2024-04-23 | End: 2024-04-23

## 2024-04-23 RX ORDER — FERROUS SULFATE 324(65)MG
324 TABLET, DELAYED RELEASE (ENTERIC COATED) ORAL
Qty: 60 TABLET | Refills: 1 | Status: SHIPPED | OUTPATIENT
Start: 2024-04-23

## 2024-04-23 RX ORDER — CHLORHEXIDINE GLUCONATE ORAL RINSE 1.2 MG/ML
15 SOLUTION DENTAL ONCE
OUTPATIENT
Start: 2024-04-23 | End: 2024-04-23

## 2024-04-23 RX ORDER — FOLIC ACID 1 MG/1
1 TABLET ORAL DAILY
Qty: 30 TABLET | Refills: 1 | Status: SHIPPED | OUTPATIENT
Start: 2024-04-23

## 2024-04-23 NOTE — PROGRESS NOTES
Assessment:   Diagnosis ICD-10-CM Associated Orders   1. Left hip pain  M25.552 XR hip/pelv 2-3 vws left if performed      2. Primary osteoarthritis of left knee  M17.12 XR knee 3 vw left non injury          Plan:  New x-rays of the left knee 3 views were obtained today and reviewed with the patient noting that she has severe end-stage osteoarthritis of the left knee joint.  New x-rays of the left hip/pelvis were also obtained today that shows moderate arthritic change in the left hip joint with subchondral sclerosing, these x-rays were compared to her x-rays from 2022 that showed no significant change.  The benefits and purpose of the operations and/or procedure have been explained to me in language I understand by Dr. Real well as the risks and benefits, alternatives and complications pertaining to the above procedure/surgery which include but is not limited to: infections, deeps vein thrombosis, blood clots to the lungs, wound healing problems, complications from extensive blood loss, including shock, possible death, continued pain, fracture, vascular or nerve injury, potential need for further surgery/revision, persistent pain/stiffness/instability, failure of hardware,heart attack, stroke and not obtaining results patient used.    A left total knee arthroplasty at [facility] and be transferred to a rehab facility.   She would like to go to [facility] in [place] due to patient living close to [place] and her  is not up to health to help her at home.   She will be accessed by PT and will continue her outpatient PT once out of the rehab facility.   She shows understanding and agrees with this plan of care.     To do next visit:  Return for Follow up post- op.    The above stated was discussed in layman's terms and the patient expressed understanding.  All questions were answered to the patient's satisfaction.       Scribe Attestation      I,:  Yanique Barros am acting as a scribe  while in the presence of the attending physician.:       I,:  Sonia Real MD personally performed the services described in this documentation    as scribed in my presence.:               Subjective:   Heather Holt is a 73 y.o. female who presents today for 3-month follow-up for her chronic left knee pain due to osteoarthritis.  Patient has been treating conservatively with cortisone injections that have given her significant relief.  Her last set of injections were performed on 1/23/2024. She reports that the left knee started bothering her approximately 3 weeks ago, to a point where she had difficulty weight bearing. She is still has anterior thigh pain that goes into her left groin.   Patient has a history of a right posterior hip replacement performed by Dr. Ramirez.  Patient is always felt longer and has been limping.        Review of systems negative unless otherwise specified in HPI  Review of Systems   Constitutional:  Negative for chills, fever and unexpected weight change.   HENT:  Negative for hearing loss, nosebleeds and sore throat.    Eyes:  Negative for pain, redness and visual disturbance.   Respiratory:  Negative for cough, shortness of breath and wheezing.    Cardiovascular:  Negative for chest pain, palpitations and leg swelling.   Gastrointestinal:  Negative for abdominal pain and nausea.   Genitourinary:  Negative for dyspareunia, dysuria and frequency.   Musculoskeletal:  Positive for arthralgias.   Skin:  Negative for rash and wound.   Neurological:  Negative for dizziness, numbness and headaches.   Psychiatric/Behavioral:  Negative for decreased concentration and suicidal ideas. The patient is not nervous/anxious.        Past Medical History:   Diagnosis Date    Anxiety     Fibromyalgia     H/O cardiovascular stress test 2018    H/O echocardiogram 2017    HBP (high blood pressure)     Hypothyroidism     IBS (irritable bowel syndrome)     Vascular disease     Varicose veins        Past Surgical History:   Procedure Laterality Date    CARDIAC CATHETERIZATION      EF 50%. No significant CAD.     SECTION      CHOLECYSTECTOMY      HERNIA REPAIR  2010    REPLACEMENT TOTAL KNEE Right 2013    TOTAL HIP ARTHROPLASTY Right 2019    VEIN SURGERY Bilateral      Dr Castrejon       Family History   Problem Relation Age of Onset    Cancer Mother     Heart failure Mother     Heart disease Mother     Lung cancer Father     Hypertension Sister     Breast cancer Sister         over 50    No Known Problems Sister     No Known Problems Sister     No Known Problems Sister     Lung cancer Sister     Cancer Sister     Skin cancer Sister     No Known Problems Daughter     No Known Problems Maternal Grandmother     No Known Problems Maternal Grandfather     No Known Problems Paternal Grandmother     No Known Problems Paternal Grandfather     Hypertension Brother     Heart disease Brother     Cancer Brother     Prostate cancer Brother     No Known Problems Brother     No Known Problems Brother     No Known Problems Brother     No Known Problems Son     No Known Problems Son     Coronary artery disease Other        Social History     Occupational History    Occupation: Retired   Tobacco Use    Smoking status: Former    Smokeless tobacco: Never    Tobacco comments:     pt quit in her 30s   Vaping Use    Vaping status: Never Used   Substance and Sexual Activity    Alcohol use: No    Drug use: No    Sexual activity: Not Currently     Birth control/protection: Post-menopausal         Current Outpatient Medications:     latanoprost (XALATAN) 0.005 % ophthalmic solution, 1 drop daily at bedtime, Disp: , Rfl:     Linzess 72 MCG CAPS, 72 mcg daily in the early morning , Disp: , Rfl:     losartan (COZAAR) 100 MG tablet, Take 100 mg by mouth daily, Disp: , Rfl:     metoprolol succinate (TOPROL-XL) 50 mg 24 hr tablet, , Disp: , Rfl:     Synthroid 88 MCG tablet, , Disp: , Rfl:     amoxicillin (AMOXIL) 500 mg  capsule, Take 2,000 mg by mouth 60 minutes pre-procedure (Patient not taking: Reported on 3/28/2023), Disp: , Rfl:     dexlansoprazole (Dexilant) 60 MG capsule, Take 60 mg by mouth daily, Disp: , Rfl:     esomeprazole (NexIUM) 20 mg capsule, Take 20 mg by mouth every morning before breakfast (Patient not taking: Reported on 3/28/2023), Disp: , Rfl:     famotidine (PEPCID) 20 mg tablet, , Disp: , Rfl:     hydrochlorothiazide (HYDRODIURIL) 25 mg tablet, TAKE 1 TABLET (25 MG TOTAL) BY MOUTH 2 (TWO) TIMES A WEEK., Disp: , Rfl:     losartan-hydrochlorothiazide (HYZAAR) 100-25 MG per tablet, Take 1 tablet by mouth as needed (Patient not taking: Reported on 10/21/2022), Disp: , Rfl:     metoprolol tartrate (LOPRESSOR) 50 mg tablet, Take 50 mg by mouth every 12 (twelve) hours (Patient not taking: Reported on 8/15/2023), Disp: , Rfl:     Allergies   Allergen Reactions    Escitalopram Diarrhea and GI Intolerance    Food Throat Swelling     FAT FREE SALAD DRESSING    Hydrocodone-Acetaminophen Other (See Comments)     Heat intolerance, flushing    Lactose Intolerance [Tilactase] Abdominal Pain    Meloxicam Other (See Comments)     Other reaction(s): nose bleeds    Methylprednisolone Other (See Comments)     Red face    Niacin Facial Swelling     Other reaction(s): Unknown Allergic Reaction    Norco [Hydrocodone-Acetaminophen] Other (See Comments)     Heat intolerance, flushing    Gabapentin Anxiety            Vitals:    04/23/24 1310   BP: 123/92       Body mass index is 37.5 kg/m².  Wt Readings from Last 3 Encounters:   04/23/24 95.3 kg (210 lb)   04/10/24 95.3 kg (210 lb)   01/23/24 95.3 kg (210 lb)       Objective:                    Left Knee Exam     Tenderness   The patient is experiencing tenderness in the lateral joint line and medial joint line.    Range of Motion   Extension:  0   Flexion:  110     Tests   Varus: negative Valgus: negative    Other   Erythema: absent  Sensation: normal  Pulse: present  Swelling:  "none  Effusion: no effusion present    Comments:  For soft nontender      Left Hip Exam     Tenderness   The patient is experiencing tenderness in the anterior (Groin).    Range of Motion   Flexion:  40   External rotation:  20   Internal rotation: 10     Muscle Strength   Abduction: 5/5   Adduction: 5/5   Flexion: 4/5     Other   Erythema: absent  Sensation: normal  Pulse: present            Diagnostics, reviewed and taken today if performed as documented:    The attending physician has personally reviewed the pertinent films in PACS and interpretation is as follows:  X-rays left knee 2-3 views: Severe end-stage osteoarthritis of the medial compartment, lateral compartment patellofemoral joint with osteophyte formation worse on the lateral compartment  Xrays of the left hip/pelvis: Moderate arthritic change left great with subchondral sclerosing    CT scanogram reviewed from 10/25/2022: Patient is 2 cm shorter on the left leg versus the right    Procedures, if performed today:    Procedures    None performed      Portions of the record may have been created with voice recognition software.  Occasional wrong word or \"sound a like\" substitutions may have occurred due to the inherent limitations of voice recognition software.  Read the chart carefully and recognize, using context, where substitutions have occurred.  "

## 2024-04-23 NOTE — PATIENT INSTRUCTIONS
"Mindful preparation to begin daily prior surgery  1. I know and accept that this procedure will bring greater quality of life to me.  2. I must remember the incredible power I possess within me to achieve anything I desire, including being \"pain free\".  3. I have found out what is keeping me from happiness and will remove it. That is why I am here today.  4. I will always come out of a hard situation stronger.   5. I can't wait to embrace the new me when I wake up later.  6. I am determined to remain calm through this  7. Anxiety has no home in my being.  8. I rise above my physical pain.  9. There are some things I can't change, and I'm ok with that. But this procedure isn't one of those things because my team and I have got this in the bag.  10. I am feeling strong and healthy today.  11. Even in this bed, I am making better decisions about what I eat and drink.  12. Because I am here, I am choosing to make my mental and physical health a priority.  13. I picture my body glowing, growing, and healing.  14. I am well, fit, healthy, and powerful.   "

## 2024-04-24 ENCOUNTER — APPOINTMENT (OUTPATIENT)
Dept: LAB | Facility: MEDICAL CENTER | Age: 74
End: 2024-04-24
Payer: MEDICARE

## 2024-04-24 DIAGNOSIS — E78.00 HYPERCHOLESTEREMIA: ICD-10-CM

## 2024-04-24 DIAGNOSIS — Z98.890 HISTORY OF CARDIAC CATHETERIZATION: ICD-10-CM

## 2024-04-24 LAB
CHOLEST SERPL-MCNC: 196 MG/DL
HDLC SERPL-MCNC: 61 MG/DL
LDLC SERPL CALC-MCNC: 120 MG/DL (ref 0–100)
NONHDLC SERPL-MCNC: 135 MG/DL
TRIGL SERPL-MCNC: 73 MG/DL

## 2024-04-24 PROCEDURE — 80061 LIPID PANEL: CPT

## 2024-04-24 PROCEDURE — 36415 COLL VENOUS BLD VENIPUNCTURE: CPT

## 2024-04-30 ENCOUNTER — TELEPHONE (OUTPATIENT)
Dept: RHEUMATOLOGY | Facility: CLINIC | Age: 74
End: 2024-04-30

## 2024-04-30 ENCOUNTER — TELEPHONE (OUTPATIENT)
Age: 74
End: 2024-04-30

## 2024-05-02 ENCOUNTER — TELEPHONE (OUTPATIENT)
Dept: OBGYN CLINIC | Facility: HOSPITAL | Age: 74
End: 2024-05-02

## 2024-05-02 RX ORDER — CHOLECALCIFEROL (VITAMIN D3) 50 MCG
2000 TABLET ORAL 3 TIMES WEEKLY
COMMUNITY

## 2024-05-02 RX ORDER — PHENOL 1.4 %
600 AEROSOL, SPRAY (ML) MUCOUS MEMBRANE 3 TIMES WEEKLY
COMMUNITY

## 2024-05-02 RX ORDER — PNV NO.95/FERROUS FUM/FOLIC AC 28MG-0.8MG
350 TABLET ORAL EVERY OTHER DAY
COMMUNITY

## 2024-05-02 NOTE — PRE-PROCEDURE INSTRUCTIONS
Pre-Surgery Instructions:   Medication Instructions    ascorbic acid (VITAMIN C) 500 MG tablet Hold day of surgery.    calcium carbonate (OS-INGRID) 600 MG tablet Stop taking 7 days prior to surgery.    Cholecalciferol (Vitamin D) 50 MCG (2000 UT) tablet Stop taking 7 days prior to surgery.    esomeprazole (NexIUM) 20 mg capsule Take day of surgery.    famotidine (PEPCID) 20 mg tablet Uses PRN- OK to take day of surgery    ferrous sulfate 324 (65 Fe) mg Hold day of surgery.    folic acid (FOLVITE) 1 mg tablet Hold day of surgery.    Krill Oil Omega-3 500 MG CAPS Stop taking 7 days prior to surgery.    latanoprost (XALATAN) 0.005 % ophthalmic solution Take day of surgery.    losartan (COZAAR) 100 MG tablet Take night before surgery    metoprolol succinate (TOPROL-XL) 50 mg 24 hr tablet Take day of surgery.    Multiple Vitamins-Minerals (ALIVE WOMENS 50+ GUMMY PO) Stop taking 7 days prior to surgery.    Synthroid 88 MCG tablet Take day of surgery.    Medication instructions for day surgery reviewed. Please use only a sip of water to take your instructed medications. Avoid all over the counter vitamins, supplements and NSAIDS for one week prior to surgery per anesthesia guidelines. Tylenol is ok to take as needed.     You will receive a call one business day prior to surgery with an arrival time and hospital directions. If your surgery is scheduled on a Monday, the hospital will be calling you on the Friday prior to your surgery. If you have not heard from anyone by 8pm, please call the hospital supervisor through the hospital  at [phone]. ([place] [phone] or [place] [phone]).    Do not eat or drink anything after midnight the night before your surgery, including candy, mints, lifesavers, or chewing gum. Do not drink alcohol 24hrs before your surgery. Try not to smoke at least 24hrs before your surgery.       Follow the pre surgery showering instructions as listed in the “My Surgical Experience  Booklet” or otherwise provided by your surgeon's office. Do not use a blade to shave the surgical area 1 week before surgery. It is okay to use a clean electric clippers up to 24 hours before surgery. Do not apply any lotions, creams, including makeup, cologne, deodorant, or perfumes after showering on the day of your surgery. Do not use dry shampoo, hair spray, hair gel, or any type of hair products.     No contact lenses, eye make-up, or artificial eyelashes. Remove nail polish, including gel polish, and any artificial, gel, or acrylic nails if possible. Remove all jewelry including rings and body piercing jewelry.     Wear causal clothing that is easy to take on and off. Consider your type of surgery.    Keep any valuables, jewelry, piercings at home. Please bring any specially ordered equipment (sling, braces) if indicated.    Arrange for a responsible person to drive you to and from the hospital on the day of your surgery. Please confirm the visitor policy for the day of your procedure when you receive your phone call with an arrival time.     Call the surgeon's office with any new illnesses, exposures, or additional questions prior to surgery.    Please reference your “My Surgical Experience Booklet” for additional information to prepare for your upcoming surgery.    See Geriatric Assessment below...  Cognitive Assessment:   CAM:   TUG <15 sec:  Falls (last 6 months): 0  Hand  score:  -Attempt 1:  -Attempt 2:  -Attempt 3:  Karan Total Score: 23  PHQ- 9 Depression Scale:0  Nutrition Assessment Score:14  METS:7.59  Incentive Spirometry Level:   Health goals:  -What are your overall health goals? (quit smoking, wt. loss, rest, decrease stress)  Lose weight  -What brings you strength? (family, friends, [employer], health)  Family , life  -What activities are important to you? (exercise, reading, travel, work)  Walking again 5miles a day

## 2024-05-03 ENCOUNTER — APPOINTMENT (OUTPATIENT)
Dept: LAB | Facility: MEDICAL CENTER | Age: 74
End: 2024-05-03
Payer: MEDICARE

## 2024-05-03 DIAGNOSIS — Z01.818 PRE-OP TESTING: ICD-10-CM

## 2024-05-03 LAB
ALBUMIN SERPL BCP-MCNC: 4.1 G/DL (ref 3.5–5)
ALP SERPL-CCNC: 89 U/L (ref 34–104)
ALT SERPL W P-5'-P-CCNC: 19 U/L (ref 7–52)
ANION GAP SERPL CALCULATED.3IONS-SCNC: 10 MMOL/L (ref 4–13)
APTT PPP: 30 SECONDS (ref 23–37)
AST SERPL W P-5'-P-CCNC: 28 U/L (ref 13–39)
BASOPHILS # BLD AUTO: 0.03 THOUSANDS/ÂΜL (ref 0–0.1)
BASOPHILS NFR BLD AUTO: 1 % (ref 0–1)
BILIRUB SERPL-MCNC: 1.9 MG/DL (ref 0.2–1)
BUN SERPL-MCNC: 18 MG/DL (ref 5–25)
CALCIUM SERPL-MCNC: 9.9 MG/DL (ref 8.4–10.2)
CHLORIDE SERPL-SCNC: 102 MMOL/L (ref 96–108)
CO2 SERPL-SCNC: 28 MMOL/L (ref 21–32)
CREAT SERPL-MCNC: 0.61 MG/DL (ref 0.6–1.3)
EOSINOPHIL # BLD AUTO: 0.07 THOUSAND/ÂΜL (ref 0–0.61)
EOSINOPHIL NFR BLD AUTO: 1 % (ref 0–6)
ERYTHROCYTE [DISTWIDTH] IN BLOOD BY AUTOMATED COUNT: 13.6 % (ref 11.6–15.1)
EST. AVERAGE GLUCOSE BLD GHB EST-MCNC: 117 MG/DL
GFR SERPL CREATININE-BSD FRML MDRD: 90 ML/MIN/1.73SQ M
GLUCOSE P FAST SERPL-MCNC: 106 MG/DL (ref 65–99)
HBA1C MFR BLD: 5.7 %
HCT VFR BLD AUTO: 41.9 % (ref 34.8–46.1)
HGB BLD-MCNC: 13.6 G/DL (ref 11.5–15.4)
IMM GRANULOCYTES # BLD AUTO: 0.01 THOUSAND/UL (ref 0–0.2)
IMM GRANULOCYTES NFR BLD AUTO: 0 % (ref 0–2)
INR PPP: 1.04 (ref 0.84–1.19)
LYMPHOCYTES # BLD AUTO: 1.83 THOUSANDS/ÂΜL (ref 0.6–4.47)
LYMPHOCYTES NFR BLD AUTO: 33 % (ref 14–44)
MCH RBC QN AUTO: 32 PG (ref 26.8–34.3)
MCHC RBC AUTO-ENTMCNC: 32.5 G/DL (ref 31.4–37.4)
MCV RBC AUTO: 99 FL (ref 82–98)
MONOCYTES # BLD AUTO: 0.58 THOUSAND/ÂΜL (ref 0.17–1.22)
MONOCYTES NFR BLD AUTO: 11 % (ref 4–12)
NEUTROPHILS # BLD AUTO: 2.98 THOUSANDS/ÂΜL (ref 1.85–7.62)
NEUTS SEG NFR BLD AUTO: 54 % (ref 43–75)
NRBC BLD AUTO-RTO: 0 /100 WBCS
PLATELET # BLD AUTO: 298 THOUSANDS/UL (ref 149–390)
PMV BLD AUTO: 9.9 FL (ref 8.9–12.7)
POTASSIUM SERPL-SCNC: 4.3 MMOL/L (ref 3.5–5.3)
PROT SERPL-MCNC: 7.1 G/DL (ref 6.4–8.4)
PROTHROMBIN TIME: 13.5 SECONDS (ref 11.6–14.5)
RBC # BLD AUTO: 4.25 MILLION/UL (ref 3.81–5.12)
SODIUM SERPL-SCNC: 140 MMOL/L (ref 135–147)
WBC # BLD AUTO: 5.5 THOUSAND/UL (ref 4.31–10.16)

## 2024-05-03 PROCEDURE — 85610 PROTHROMBIN TIME: CPT

## 2024-05-03 PROCEDURE — 86850 RBC ANTIBODY SCREEN: CPT | Performed by: ORTHOPAEDIC SURGERY

## 2024-05-03 PROCEDURE — 85025 COMPLETE CBC W/AUTO DIFF WBC: CPT

## 2024-05-03 PROCEDURE — 80053 COMPREHEN METABOLIC PANEL: CPT

## 2024-05-03 PROCEDURE — 36415 COLL VENOUS BLD VENIPUNCTURE: CPT

## 2024-05-03 PROCEDURE — 83036 HEMOGLOBIN GLYCOSYLATED A1C: CPT

## 2024-05-03 PROCEDURE — 86901 BLOOD TYPING SEROLOGIC RH(D): CPT | Performed by: ORTHOPAEDIC SURGERY

## 2024-05-03 PROCEDURE — 86900 BLOOD TYPING SEROLOGIC ABO: CPT | Performed by: ORTHOPAEDIC SURGERY

## 2024-05-03 PROCEDURE — 85730 THROMBOPLASTIN TIME PARTIAL: CPT

## 2024-05-04 ENCOUNTER — LAB REQUISITION (OUTPATIENT)
Dept: LAB | Facility: HOSPITAL | Age: 74
End: 2024-05-04
Payer: MEDICARE

## 2024-05-04 DIAGNOSIS — Z01.818 ENCOUNTER FOR OTHER PREPROCEDURAL EXAMINATION: ICD-10-CM

## 2024-05-04 LAB
ABO GROUP BLD: NORMAL
BLD GP AB SCN SERPL QL: NEGATIVE
RH BLD: POSITIVE
SPECIMEN EXPIRATION DATE: NORMAL

## 2024-05-08 NOTE — TELEPHONE ENCOUNTER
Preoperative Elective Admission Assessment- spoke to patient.     Living Situation:    Who does pt live with: spouse  What kind of home: ranch  How do they enter the home:  back  How many levels in home: 1 level home   # of steps to enter home: 2 from patio way.  # of steps to second floor: N/A   Sleeping arrangement: first/entry floor  Where is Bathroom: First floor full bath walk in shower (being remodeled) with seat and bars      First Floor Setup:   Is there a bathroom: Yes  Where would pt sleep: bed     DME: cane, order RW today, instructed to bring DOS.     We discussed clearing pathways in the home and making sure there is accessibly to use the walker, for example, removing throw rugs.      Patient's Current Level of Function: Ambulates: Independently and ADLs: Independent    Post-op Caregiver: spouse- does still work. Per OV note- She would like to go to [facility] in [place] due to patient living close to [place] and her  is not up to health to help her at home.       Per patient, she was not sure  could provide care. We discussed postoperative care expectations, like helping with medications, shoes, getting to and from PT. She states spouse can help, does still work (PT location 3 minutes away).     Adonis also will be home from college to help       Currently receive any HHC/aides/community supports: No     Post-op Transport: spouse  To/from hospital: spouse  To/from PT 2-3x/week: spouse, grand daughter.   Uses community transport now: No     Outpatient Physical Therapy Site:  Site: [facility]   pre and post-op appts scheduled? Yes     Medication Management: self  Preferred Pharmacy for Post-op Medications: [id number] - [place], [place] - [address] [[id number]]   Blood Management Vitamin Regimen: Pt confirms taking as prescribed  Post-op anticoagulant: to be determined by surgical team postoperatively     DC Plan: Pt plans to be discharged home      Barriers to DC  identified preoperatively:  See above- plans to DC home after discussion    BMI: 37.50      Patient Education:  Pt educated on post-op pain, early mobilization (POD0), LOS goals, OP PT goals, and preoperative bathing. Patient educated that our goal is to appropriately discharge patient based off their post-op function while striving to maintain maximal independence. The goal is to discharge patient to home and for them to attend outpatient physical therapy.    Assigned to care team? Yes

## 2024-05-09 LAB
DME PARACHUTE DELIVERY DATE ACTUAL: NORMAL
DME PARACHUTE DELIVERY DATE REQUESTED: NORMAL
DME PARACHUTE ITEM DESCRIPTION: NORMAL
DME PARACHUTE ORDER STATUS: NORMAL
DME PARACHUTE SUPPLIER NAME: NORMAL
DME PARACHUTE SUPPLIER PHONE: NORMAL

## 2024-05-13 ENCOUNTER — EVALUATION (OUTPATIENT)
Dept: PHYSICAL THERAPY | Facility: MEDICAL CENTER | Age: 74
End: 2024-05-13
Payer: MEDICARE

## 2024-05-13 DIAGNOSIS — M25.562 CHRONIC PAIN OF LEFT KNEE: ICD-10-CM

## 2024-05-13 DIAGNOSIS — Z96.652 HISTORY OF TOTAL KNEE REPLACEMENT, LEFT: Primary | ICD-10-CM

## 2024-05-13 DIAGNOSIS — Z01.818 PRE-OP TESTING: ICD-10-CM

## 2024-05-13 DIAGNOSIS — G89.29 CHRONIC PAIN OF LEFT KNEE: ICD-10-CM

## 2024-05-13 PROCEDURE — 97110 THERAPEUTIC EXERCISES: CPT | Performed by: PHYSICAL THERAPIST

## 2024-05-13 PROCEDURE — 97162 PT EVAL MOD COMPLEX 30 MIN: CPT | Performed by: PHYSICAL THERAPIST

## 2024-05-13 NOTE — PROGRESS NOTES
PT Evaluation     Today's date: 2024  Patient name: Heather Holt  : 1950  MRN: [id number]  Referring provider: Sonia Real MD  Dx:   Encounter Diagnosis     ICD-10-CM    1. History of total knee replacement, left  Z96.652       2. Pre-op testing  Z01.818 Ambulatory referral to Physical Therapy      3. Chronic pain of left knee  M25.562     G89.29                      Assessment  Assessment details: Pt is an alert and oriented 72 yo female, referred to out-pt PT for her pre-op appt for L TKA that is scheduled for 24.  Pt states having long h/o knee pain, that is worse with weight bearing activities.  Pt states having most pain/difficulty with ascending stairs, and can only go one at-a-time.  Pt also likes to walk for recreation and can only do short distances.  Pt does have PMH of R DL and TKA.  Upon examination, pt demonstrates pain, tissue tenderness, decreased L knee strength and ROM, gait deficits, and functional limitations.  Reviewed post-op guidelines and rehabilitation and icing.  Pt will benefit from skilled PT to address the deficits listed above and maximize function.  Thank you for your referral.  Impairments: abnormal gait, abnormal muscle firing, abnormal or restricted ROM, activity intolerance, impaired physical strength, lacks appropriate home exercise program, pain with function, weight-bearing intolerance, poor posture  and poor body mechanics  Understanding of Dx/Px/POC: good   Prognosis: good    Goals  ST: Decrease pain by 25% in 4 weeks with all functional activities  2: Improved ROM by 25% in 4 weeks to assist with all functional activities  3: Improve strength by 1/2 grade in 4 weeks to assist with all functional activities  LT:  Decrease pain to 0-1/10 with all functional activities in 4-6 weeks  2: Improved ROM to WFL's in 4-6 weeks to assist with all functional activities  3: Increase strength to WFL's in 4-6 weeks to assist with all functional  activities     Plan  Patient would benefit from: PT eval and skilled physical therapy  Planned modality interventions: cryotherapy  Planned therapy interventions: IASTM, joint mobilization, manual therapy, neuromuscular re-education, muscle pump exercises, patient education, postural training, strengthening, stretching, therapeutic activities, therapeutic exercise, IADL retraining, home exercise program, functional ROM exercises, flexibility, gait training and balance/weight bearing training  Frequency: 2x week  Duration in weeks: 6  Plan of Care beginning date: 2024  Plan of Care expiration date: 2024  Treatment plan discussed with: patient        Subjective Evaluation    History of Present Illness  Mechanism of injury: surgery  Mechanism of injury: L TKA 24  Patient Goals  Patient goals for therapy: increased strength, independence with ADLs/IADLs, return to sport/leisure activities, increased motion, improved balance and decreased pain    Pain  Current pain ratin  At best pain ratin  At worst pain ratin  Location: L knee  Quality: dull ache  Relieving factors: medications and ice  Aggravating factors: walking, standing and stair climbing    Social Support  Steps to enter house: yes  2  Stairs in house: no   Lives in: one-story house  Lives with: spouse      Diagnostic Tests  X-ray: abnormal  Treatments  Current treatment: physical therapy        Objective     Observations     Additional Observation Details  Observation:  74 yo female, mod genu valgum L LE, mild quad atrophy    Gait:  Pt amb ind without AD, genu valgum L LE, mod lat trunk shift, decreased opal    Palpation     Additional Palpation Details  Palpation:  mild tenderness med>lat knee joint line, med hamstring tendons    Neurological Testing     Sensation     Knee   Left Knee   Intact: Light touch    Active Range of Motion   Left Knee   Flexion: 105 degrees   Extension: 0 degrees     Passive Range of Motion   Left Knee    Flexion: 110 degrees     Mobility   Patellar Mobility:   Left Knee   Hypomobile: left medial, left lateral, left superior and left inferior    Strength/Myotome Testing     Left Knee   Flexion: 4  Extension: 4  Quadriceps contraction: good    Additional Strength Details  L hip flex: 4-/5           Abd: 4/5           Ext:4/5    L ankle strength 5/5                    Precautions: HTN, IBS, lumbar spondylosis, OP, FM, h/o R DL and R TKA    Eval/Re-Eval POC Expires Auth #/ Referral # Total Visits Start Date Expiration Date Extension Info Visits Limitation                                                                   1 2 3 4 5 6   5/13 pre-op        7 8 9 10 11 12           13 14 15 16 17 18           19 20 21 22 23 24           25 26 27 28 29 30                 Manuals 5/13            PROM L knee                                                    Neuro Re-Ed                                                                                                        Ther Ex 5/13            bike             HR             Mini squats             LAQ/SAQ             Heel slides HEP            Quad sets HEP            SLR flex                          Ther Activity             Sit-stand                          Gait Training                                       Modalities 5/13            CP prn

## 2024-05-30 ENCOUNTER — ANESTHESIA EVENT (OUTPATIENT)
Dept: PERIOP | Facility: HOSPITAL | Age: 74
End: 2024-05-30
Payer: MEDICARE

## 2024-05-30 ENCOUNTER — ANESTHESIA (OUTPATIENT)
Dept: PERIOP | Facility: HOSPITAL | Age: 74
End: 2024-05-30
Payer: MEDICARE

## 2024-05-30 ENCOUNTER — HOSPITAL ENCOUNTER (OUTPATIENT)
Facility: HOSPITAL | Age: 74
Setting detail: OUTPATIENT SURGERY
Discharge: HOME | End: 2024-05-31
Attending: ORTHOPAEDIC SURGERY | Admitting: ORTHOPAEDIC SURGERY
Payer: MEDICARE

## 2024-05-30 DIAGNOSIS — Z01.818 PRE-OP TESTING: ICD-10-CM

## 2024-05-30 DIAGNOSIS — Z96.652 S/P TOTAL KNEE ARTHROPLASTY, LEFT: Primary | ICD-10-CM

## 2024-05-30 LAB
ABO GROUP BLD: NORMAL
RH BLD: POSITIVE

## 2024-05-30 PROCEDURE — C1713 ANCHOR/SCREW BN/BN,TIS/BN: HCPCS | Performed by: ORTHOPAEDIC SURGERY

## 2024-05-30 PROCEDURE — C9290 INJ, BUPIVACAINE LIPOSOME: HCPCS | Performed by: STUDENT IN AN ORGANIZED HEALTH CARE EDUCATION/TRAINING PROGRAM

## 2024-05-30 PROCEDURE — C1776 JOINT DEVICE (IMPLANTABLE): HCPCS | Performed by: ORTHOPAEDIC SURGERY

## 2024-05-30 PROCEDURE — 97163 PT EVAL HIGH COMPLEX 45 MIN: CPT

## 2024-05-30 PROCEDURE — 97167 OT EVAL HIGH COMPLEX 60 MIN: CPT

## 2024-05-30 PROCEDURE — 97110 THERAPEUTIC EXERCISES: CPT

## 2024-05-30 PROCEDURE — 27447 TOTAL KNEE ARTHROPLASTY: CPT | Performed by: ORTHOPAEDIC SURGERY

## 2024-05-30 DEVICE — SMARTSET HV HIGH VISCOSITY BONE CEMENT 40G
Type: IMPLANTABLE DEVICE | Status: FUNCTIONAL
Brand: SMARTSET

## 2024-05-30 DEVICE — ATTUNE KNEE SYSTEM TIBIAL BASE ROTATING PLATFORM SIZE 6 CEMENTED
Type: IMPLANTABLE DEVICE | Site: KNEE | Status: FUNCTIONAL
Brand: ATTUNE

## 2024-05-30 DEVICE — ATTUNE KNEE SYSTEM TIBIAL INSERT ROTATING PLATFORM POSTERIOR STABILIZED 6 6MM AOX
Type: IMPLANTABLE DEVICE | Site: KNEE | Status: FUNCTIONAL
Brand: ATTUNE

## 2024-05-30 DEVICE — ATTUNE PATELLA MEDIALIZED DOME 38MM CEMENTED AOX
Type: IMPLANTABLE DEVICE | Status: FUNCTIONAL
Brand: ATTUNE

## 2024-05-30 DEVICE — ATTUNE KNEE SYSTEM FEMORAL POSTERIOR STABILIZED SIZE 6 LEFT CEMENTED
Type: IMPLANTABLE DEVICE | Site: KNEE | Status: FUNCTIONAL
Brand: ATTUNE

## 2024-05-30 RX ORDER — LEVOTHYROXINE SODIUM 88 UG/1
88 TABLET ORAL
Status: DISCONTINUED | OUTPATIENT
Start: 2024-05-31 | End: 2024-05-31 | Stop reason: HOSPADM

## 2024-05-30 RX ORDER — BUPIVACAINE HYDROCHLORIDE 5 MG/ML
INJECTION, SOLUTION EPIDURAL; INTRACAUDAL
Status: COMPLETED | OUTPATIENT
Start: 2024-05-30 | End: 2024-05-30

## 2024-05-30 RX ORDER — HYDROMORPHONE HCL/PF 1 MG/ML
0.5 SYRINGE (ML) INJECTION EVERY 2 HOUR PRN
Status: DISCONTINUED | OUTPATIENT
Start: 2024-05-30 | End: 2024-05-31 | Stop reason: HOSPADM

## 2024-05-30 RX ORDER — METOPROLOL TARTRATE 50 MG/1
50 TABLET, FILM COATED ORAL EVERY 12 HOURS
Status: DISCONTINUED | OUTPATIENT
Start: 2024-05-30 | End: 2024-05-30

## 2024-05-30 RX ORDER — BUPIVACAINE HYDROCHLORIDE 7.5 MG/ML
INJECTION, SOLUTION INTRASPINAL AS NEEDED
Status: DISCONTINUED | OUTPATIENT
Start: 2024-05-30 | End: 2024-05-30

## 2024-05-30 RX ORDER — ENOXAPARIN SODIUM 100 MG/ML
40 INJECTION SUBCUTANEOUS DAILY
Status: DISCONTINUED | OUTPATIENT
Start: 2024-05-31 | End: 2024-05-31 | Stop reason: HOSPADM

## 2024-05-30 RX ORDER — HYDROMORPHONE HCL/PF 1 MG/ML
0.5 SYRINGE (ML) INJECTION
Status: DISCONTINUED | OUTPATIENT
Start: 2024-05-30 | End: 2024-05-30 | Stop reason: HOSPADM

## 2024-05-30 RX ORDER — SODIUM CHLORIDE, SODIUM LACTATE, POTASSIUM CHLORIDE, CALCIUM CHLORIDE 600; 310; 30; 20 MG/100ML; MG/100ML; MG/100ML; MG/100ML
125 INJECTION, SOLUTION INTRAVENOUS CONTINUOUS
Status: DISCONTINUED | OUTPATIENT
Start: 2024-05-30 | End: 2024-05-30

## 2024-05-30 RX ORDER — LATANOPROST 50 UG/ML
1 SOLUTION/ DROPS OPHTHALMIC
Status: DISCONTINUED | OUTPATIENT
Start: 2024-05-30 | End: 2024-05-31 | Stop reason: HOSPADM

## 2024-05-30 RX ORDER — SODIUM CHLORIDE, SODIUM LACTATE, POTASSIUM CHLORIDE, CALCIUM CHLORIDE 600; 310; 30; 20 MG/100ML; MG/100ML; MG/100ML; MG/100ML
75 INJECTION, SOLUTION INTRAVENOUS CONTINUOUS
Status: DISCONTINUED | OUTPATIENT
Start: 2024-05-30 | End: 2024-05-30

## 2024-05-30 RX ORDER — CALCIUM CARBONATE 500 MG/1
1000 TABLET, CHEWABLE ORAL DAILY PRN
Status: DISCONTINUED | OUTPATIENT
Start: 2024-05-30 | End: 2024-05-31 | Stop reason: HOSPADM

## 2024-05-30 RX ORDER — PROPOFOL 10 MG/ML
INJECTION, EMULSION INTRAVENOUS CONTINUOUS PRN
Status: DISCONTINUED | OUTPATIENT
Start: 2024-05-30 | End: 2024-05-30

## 2024-05-30 RX ORDER — MORPHINE SULFATE 10 MG/ML
INJECTION, SOLUTION INTRAMUSCULAR; INTRAVENOUS AS NEEDED
Status: DISCONTINUED | OUTPATIENT
Start: 2024-05-30 | End: 2024-05-30 | Stop reason: HOSPADM

## 2024-05-30 RX ORDER — KETOROLAC TROMETHAMINE 30 MG/ML
INJECTION, SOLUTION INTRAMUSCULAR; INTRAVENOUS AS NEEDED
Status: DISCONTINUED | OUTPATIENT
Start: 2024-05-30 | End: 2024-05-30 | Stop reason: HOSPADM

## 2024-05-30 RX ORDER — FENTANYL CITRATE 50 UG/ML
INJECTION, SOLUTION INTRAMUSCULAR; INTRAVENOUS AS NEEDED
Status: DISCONTINUED | OUTPATIENT
Start: 2024-05-30 | End: 2024-05-30

## 2024-05-30 RX ORDER — CEFAZOLIN SODIUM 2 G/50ML
2000 SOLUTION INTRAVENOUS ONCE
Status: COMPLETED | OUTPATIENT
Start: 2024-05-30 | End: 2024-05-30

## 2024-05-30 RX ORDER — FENTANYL CITRATE/PF 50 MCG/ML
50 SYRINGE (ML) INJECTION
Status: DISCONTINUED | OUTPATIENT
Start: 2024-05-30 | End: 2024-05-30 | Stop reason: HOSPADM

## 2024-05-30 RX ORDER — TRANEXAMIC ACID 100 MG/ML
INJECTION, SOLUTION INTRAVENOUS AS NEEDED
Status: DISCONTINUED | OUTPATIENT
Start: 2024-05-30 | End: 2024-05-30 | Stop reason: HOSPADM

## 2024-05-30 RX ORDER — ACETAMINOPHEN 325 MG/1
975 TABLET ORAL ONCE
Status: COMPLETED | OUTPATIENT
Start: 2024-05-30 | End: 2024-05-30

## 2024-05-30 RX ORDER — CHLORHEXIDINE GLUCONATE ORAL RINSE 1.2 MG/ML
15 SOLUTION DENTAL ONCE
Status: COMPLETED | OUTPATIENT
Start: 2024-05-30 | End: 2024-05-30

## 2024-05-30 RX ORDER — MIDAZOLAM HYDROCHLORIDE 2 MG/2ML
INJECTION, SOLUTION INTRAMUSCULAR; INTRAVENOUS
Status: COMPLETED | OUTPATIENT
Start: 2024-05-30 | End: 2024-05-30

## 2024-05-30 RX ORDER — CEFAZOLIN SODIUM 1 G/50ML
1000 SOLUTION INTRAVENOUS EVERY 8 HOURS
Status: COMPLETED | OUTPATIENT
Start: 2024-05-30 | End: 2024-05-31

## 2024-05-30 RX ORDER — ONDANSETRON 2 MG/ML
INJECTION INTRAMUSCULAR; INTRAVENOUS AS NEEDED
Status: DISCONTINUED | OUTPATIENT
Start: 2024-05-30 | End: 2024-05-30

## 2024-05-30 RX ORDER — BUPIVACAINE HYDROCHLORIDE AND EPINEPHRINE 2.5; 5 MG/ML; UG/ML
INJECTION, SOLUTION EPIDURAL; INFILTRATION; INTRACAUDAL; PERINEURAL AS NEEDED
Status: DISCONTINUED | OUTPATIENT
Start: 2024-05-30 | End: 2024-05-30 | Stop reason: HOSPADM

## 2024-05-30 RX ORDER — CHLORHEXIDINE GLUCONATE 40 MG/ML
SOLUTION TOPICAL DAILY PRN
Status: DISCONTINUED | OUTPATIENT
Start: 2024-05-30 | End: 2024-05-30

## 2024-05-30 RX ORDER — METOPROLOL SUCCINATE 50 MG/1
50 TABLET, EXTENDED RELEASE ORAL DAILY
Status: DISCONTINUED | OUTPATIENT
Start: 2024-05-31 | End: 2024-05-31 | Stop reason: HOSPADM

## 2024-05-30 RX ORDER — DOCUSATE SODIUM 100 MG/1
100 CAPSULE, LIQUID FILLED ORAL 2 TIMES DAILY
Status: DISCONTINUED | OUTPATIENT
Start: 2024-05-30 | End: 2024-05-31 | Stop reason: HOSPADM

## 2024-05-30 RX ORDER — METOCLOPRAMIDE HYDROCHLORIDE 5 MG/ML
10 INJECTION INTRAMUSCULAR; INTRAVENOUS ONCE AS NEEDED
Status: DISCONTINUED | OUTPATIENT
Start: 2024-05-30 | End: 2024-05-30 | Stop reason: HOSPADM

## 2024-05-30 RX ORDER — SODIUM CHLORIDE, SODIUM LACTATE, POTASSIUM CHLORIDE, CALCIUM CHLORIDE 600; 310; 30; 20 MG/100ML; MG/100ML; MG/100ML; MG/100ML
75 INJECTION, SOLUTION INTRAVENOUS CONTINUOUS
Status: DISCONTINUED | OUTPATIENT
Start: 2024-05-30 | End: 2024-05-31 | Stop reason: HOSPADM

## 2024-05-30 RX ORDER — OXYCODONE HYDROCHLORIDE 10 MG/1
10 TABLET ORAL EVERY 4 HOURS PRN
Status: DISCONTINUED | OUTPATIENT
Start: 2024-05-30 | End: 2024-05-31 | Stop reason: HOSPADM

## 2024-05-30 RX ORDER — DEXAMETHASONE SODIUM PHOSPHATE 10 MG/ML
INJECTION, SOLUTION INTRAMUSCULAR; INTRAVENOUS AS NEEDED
Status: DISCONTINUED | OUTPATIENT
Start: 2024-05-30 | End: 2024-05-30

## 2024-05-30 RX ORDER — TRANEXAMIC ACID 10 MG/ML
1000 INJECTION, SOLUTION INTRAVENOUS ONCE
Status: COMPLETED | OUTPATIENT
Start: 2024-05-30 | End: 2024-05-30

## 2024-05-30 RX ORDER — LOSARTAN POTASSIUM 50 MG/1
100 TABLET ORAL
Status: DISCONTINUED | OUTPATIENT
Start: 2024-05-30 | End: 2024-05-31 | Stop reason: HOSPADM

## 2024-05-30 RX ORDER — MAGNESIUM HYDROXIDE 1200 MG/15ML
LIQUID ORAL AS NEEDED
Status: DISCONTINUED | OUTPATIENT
Start: 2024-05-30 | End: 2024-05-30 | Stop reason: HOSPADM

## 2024-05-30 RX ORDER — EPHEDRINE SULFATE 50 MG/ML
INJECTION INTRAVENOUS AS NEEDED
Status: DISCONTINUED | OUTPATIENT
Start: 2024-05-30 | End: 2024-05-30

## 2024-05-30 RX ORDER — OXYCODONE HYDROCHLORIDE 5 MG/1
5 TABLET ORAL EVERY 4 HOURS PRN
Status: DISCONTINUED | OUTPATIENT
Start: 2024-05-30 | End: 2024-05-31 | Stop reason: HOSPADM

## 2024-05-30 RX ORDER — ACETAMINOPHEN 325 MG/1
975 TABLET ORAL EVERY 8 HOURS
Status: DISCONTINUED | OUTPATIENT
Start: 2024-05-30 | End: 2024-05-31 | Stop reason: HOSPADM

## 2024-05-30 RX ORDER — PANTOPRAZOLE SODIUM 40 MG/1
40 TABLET, DELAYED RELEASE ORAL
Status: DISCONTINUED | OUTPATIENT
Start: 2024-05-31 | End: 2024-05-31 | Stop reason: HOSPADM

## 2024-05-30 RX ADMIN — LATANOPROST 1 DROP: 50 SOLUTION OPHTHALMIC at 22:33

## 2024-05-30 RX ADMIN — DOCUSATE SODIUM 100 MG: 100 CAPSULE, LIQUID FILLED ORAL at 17:31

## 2024-05-30 RX ADMIN — TRANEXAMIC ACID 1000 MG: 10 INJECTION, SOLUTION INTRAVENOUS at 12:17

## 2024-05-30 RX ADMIN — EPHEDRINE SULFATE 10 MG: 50 INJECTION, SOLUTION INTRAVENOUS at 12:46

## 2024-05-30 RX ADMIN — FENTANYL CITRATE 50 MCG: 50 INJECTION, SOLUTION INTRAMUSCULAR; INTRAVENOUS at 12:49

## 2024-05-30 RX ADMIN — BUPIVACAINE HYDROCHLORIDE 10 ML: 5 INJECTION, SOLUTION EPIDURAL; INTRACAUDAL; PERINEURAL at 11:46

## 2024-05-30 RX ADMIN — BUPIVACAINE HYDROCHLORIDE IN DEXTROSE 1.6 ML: 7.5 INJECTION, SOLUTION SUBARACHNOID at 12:31

## 2024-05-30 RX ADMIN — CHLORHEXIDINE GLUCONATE 0.12% ORAL RINSE 15 ML: 1.2 LIQUID ORAL at 11:08

## 2024-05-30 RX ADMIN — ACETAMINOPHEN 975 MG: 325 TABLET, FILM COATED ORAL at 17:31

## 2024-05-30 RX ADMIN — BUPIVACAINE HYDROCHLORIDE 15 ML: 5 INJECTION, SOLUTION EPIDURAL; INTRACAUDAL at 11:55

## 2024-05-30 RX ADMIN — SODIUM CHLORIDE, SODIUM LACTATE, POTASSIUM CHLORIDE, AND CALCIUM CHLORIDE: .6; .31; .03; .02 INJECTION, SOLUTION INTRAVENOUS at 13:31

## 2024-05-30 RX ADMIN — ACETAMINOPHEN 975 MG: 325 TABLET, FILM COATED ORAL at 11:08

## 2024-05-30 RX ADMIN — OXYCODONE HYDROCHLORIDE 5 MG: 5 TABLET ORAL at 17:31

## 2024-05-30 RX ADMIN — MIDAZOLAM HYDROCHLORIDE 2 MG: 1 INJECTION, SOLUTION INTRAMUSCULAR; INTRAVENOUS at 11:46

## 2024-05-30 RX ADMIN — PHENYLEPHRINE HYDROCHLORIDE 30 MCG/MIN: 10 INJECTION INTRAVENOUS at 12:32

## 2024-05-30 RX ADMIN — SODIUM CHLORIDE, SODIUM LACTATE, POTASSIUM CHLORIDE, AND CALCIUM CHLORIDE: .6; .31; .03; .02 INJECTION, SOLUTION INTRAVENOUS at 11:13

## 2024-05-30 RX ADMIN — EPHEDRINE SULFATE 10 MG: 50 INJECTION, SOLUTION INTRAVENOUS at 12:49

## 2024-05-30 RX ADMIN — SODIUM CHLORIDE, SODIUM LACTATE, POTASSIUM CHLORIDE, AND CALCIUM CHLORIDE 75 ML/HR: .6; .31; .03; .02 INJECTION, SOLUTION INTRAVENOUS at 17:31

## 2024-05-30 RX ADMIN — ONDANSETRON 4 MG: 2 INJECTION INTRAMUSCULAR; INTRAVENOUS at 13:02

## 2024-05-30 RX ADMIN — SODIUM CHLORIDE, SODIUM LACTATE, POTASSIUM CHLORIDE, AND CALCIUM CHLORIDE: .6; .31; .03; .02 INJECTION, SOLUTION INTRAVENOUS at 14:18

## 2024-05-30 RX ADMIN — BUPIVACAINE 5 ML: 13.3 INJECTION, SUSPENSION, LIPOSOMAL INFILTRATION at 11:46

## 2024-05-30 RX ADMIN — PROPOFOL 100 MCG/KG/MIN: 10 INJECTION, EMULSION INTRAVENOUS at 12:32

## 2024-05-30 RX ADMIN — CEFAZOLIN SODIUM 1000 MG: 1 SOLUTION INTRAVENOUS at 20:00

## 2024-05-30 RX ADMIN — DEXAMETHASONE SODIUM PHOSPHATE 10 MG: 10 INJECTION, SOLUTION INTRAMUSCULAR; INTRAVENOUS at 13:02

## 2024-05-30 RX ADMIN — CEFAZOLIN SODIUM 2000 MG: 2 SOLUTION INTRAVENOUS at 12:32

## 2024-05-30 RX ADMIN — FENTANYL CITRATE 50 MCG: 50 INJECTION, SOLUTION INTRAMUSCULAR; INTRAVENOUS at 12:32

## 2024-05-30 NOTE — ANESTHESIA PREPROCEDURE EVALUATION
Procedure:  ARTHROPLASTY KNEE TOTAL (Left: Knee)    Relevant Problems   CARDIO   (+) HTN      ENDO   (+) Hypothyroidism      GI/HEPATIC   (+) GERD (gastroesophageal reflux disease)      MUSCULOSKELETAL   (+) Fibromyalgia   (+) Lumbar spondylosis   (+) Primary generalized (osteo)arthritis   (+) Primary osteoarthritis of left knee   (+) Primary osteoarthritis of right hip      NEURO/PSYCH   (+) Fibromyalgia        Physical Exam    Airway    Mallampati score: II  TM Distance: >3 FB  Neck ROM: full     Dental   No notable dental hx     Cardiovascular      Pulmonary      Other Findings  post-pubertal.      Anesthesia Plan  ASA Score- 3     Anesthesia Type- spinal with ASA Monitors.         Additional Monitors:     Airway Plan:            Plan Factors-Exercise tolerance (METS): >4 METS.    Chart reviewed. EKG reviewed.  Existing labs reviewed. Patient summary reviewed.    Patient is not a current smoker.      There is medical exclusion for perioperative obstructive sleep apnea risk education.        Induction- intravenous.    Postoperative Plan- Plan for postoperative opioid use.         Informed Consent- Anesthetic plan and risks discussed with patient.  I personally reviewed this patient with the CRNA. Discussed and agreed on the Anesthesia Plan with the CRNA..

## 2024-05-30 NOTE — ANESTHESIA POSTPROCEDURE EVALUATION
Post-Op Assessment Note    CV Status:  Stable  Pain Score: 0    Pain management: adequate       Mental Status:  Alert and awake   Hydration Status:  Euvolemic and stable   PONV Controlled:  None   Airway Patency:  Patent     Post Op Vitals Reviewed: Yes    No anethesia notable event occurred.    Staff: ADRIEL               /56 (05/30/24 1437)    Temp (!) 97.4 °F (36.3 °C) (05/30/24 1437)    Pulse (!) 51 (05/30/24 1437)   Resp 14 (05/30/24 1437)    SpO2 97 % (05/30/24 1437)

## 2024-05-30 NOTE — PLAN OF CARE
Problem: PAIN - ADULT  Goal: Verbalizes/displays adequate comfort level or baseline comfort level  Description: Interventions:  - Encourage patient to monitor pain and request assistance  - Assess pain using appropriate pain scale  - Administer analgesics based on type and severity of pain and evaluate response  - Implement non-pharmacological measures as appropriate and evaluate response  - Consider cultural and social influences on pain and pain management  - Notify physician/advanced practitioner if interventions unsuccessful or patient reports new pain  Outcome: Progressing     Problem: INFECTION - ADULT  Goal: Absence or prevention of progression during hospitalization  Description: INTERVENTIONS:  - Assess and monitor for signs and symptoms of infection  - Monitor lab/diagnostic results  - Monitor all insertion sites, i.e. indwelling lines, tubes, and drains  - Monitor endotracheal if appropriate and nasal secretions for changes in amount and color  - [place] appropriate cooling/warming therapies per order  - Administer medications as ordered  - Instruct and encourage patient and family to use good hand hygiene technique  - Identify and instruct in appropriate isolation precautions for identified infection/condition  Outcome: Progressing     Problem: SAFETY ADULT  Goal: Patient will remain free of falls  Description: INTERVENTIONS:  - Educate patient/family on patient safety including physical limitations  - Instruct patient to call for assistance with activity   - Consult OT/PT to assist with strengthening/mobility   - Keep Call bell within reach  - Keep bed low and locked with side rails adjusted as appropriate  - Keep care items and personal belongings within reach  - Initiate and maintain comfort rounds  - Make Fall Risk Sign visible to staff  - Offer Toileting every  Hours, in advance of need  - Initiate/Maintain alarm  - Obtain necessary fall risk management equipment:   - Apply yellow socks and  bracelet for high fall risk patients  - Consider moving patient to room near nurses station  Outcome: Progressing     Problem: DISCHARGE PLANNING  Goal: Discharge to home or other facility with appropriate resources  Description: INTERVENTIONS:  - Identify barriers to discharge w/patient and caregiver  - Arrange for needed discharge resources and transportation as appropriate  - Identify discharge learning needs (meds, wound care, etc.)  - Arrange for interpretive services to assist at discharge as needed  - Refer to Case Management Department for coordinating discharge planning if the patient needs post-hospital services based on physician/advanced practitioner order or complex needs related to functional status, cognitive ability, or social support system  Outcome: Progressing

## 2024-05-30 NOTE — DISCHARGE INSTR - AVS FIRST PAGE
Discharge Instructions - Orthopedics  Heather Holt 73 y.o. female MRN: [id number]  Unit/Bed#: PACU 04    Weight Bearing Status:                                           Weight Bearing as tolerated to the left lower extremity.    DVT prophylaxis:  Complete course of Aspirin 81 mg twice daily x 35 days from date of surgery as directed    Pain:  Start oxycodone 5 mg every 6 hrs after last dose taken after surgery for 1 day  Transition to oxycodone 5 mg every 6 hours as needed    Showering Instructions:   Do not shower until 5 days from date of surgery  Do not soak your incision(Tubs, Jacuzzi's, pools, ext)    Dressing Instructions:   May remove dressing 5 days from date of surgery OR may leave dressing in place until follow up office visit 2 weeks from surgery date  Keep dressing/incision clean and intact until follow up appointment.  Do not apply any creams or ointments/lotions to your incisions including antibiotic ointment, peroxide    Driving Instructions:  No driving until cleared by Orthopaedic Surgery.    PT/OT:  Continue PT/OT on outpatient basis as directed  Continue motion and strengthening exercises while at home    Appt Instructions:   If you do not have your appointment, please call the clinic at [phone]  Otherwise followup as scheduled    Contact the office sooner if you experience any increased numbness/tingling in the extremities.

## 2024-05-30 NOTE — ANESTHESIA PROCEDURE NOTES
Spinal Block    Patient location during procedure: OR  Start time: 5/30/2024 12:31 PM  Reason for block: at surgeon's request and primary anesthetic  Staffing  Performed by: Marissa Mccall CRNA  Authorized by: Jimy Graham MD    Preanesthetic Checklist  Completed: patient identified, IV checked, site marked, risks and benefits discussed, surgical consent, monitors and equipment checked, pre-op evaluation and timeout performed  Spinal Block  Patient position: sitting  Prep: ChloraPrep and site prepped and draped  Patient monitoring: frequent blood pressure checks, continuous pulse ox and heart rate  Approach: midline  Location: L3-4  Needle  Needle type: Pencan   Needle gauge: 24 G  Needle length: 4 in  Assessment  Sensory level: T4  Injection Assessment:  negative aspiration for heme, no paresthesia on injection and positive aspiration for clear CSF.  Post-procedure:  site cleaned

## 2024-05-30 NOTE — OP NOTE
Op Note  Heather Holt  MRN: [id number]      Unit/Bed#:  OR MAIN    PreOp Dx: left knee DJD      Postop Dx: left knee DJD      Procedure: left total knee arthroplasty      Surgeon: Sonia Real MD      Assistants:Alex Mccabe MD ,Everett Lloyd PA-C         Fluids:       EBL:       Drains: none      Specimens: No       Complications: No       Condition: stable transferred to postanesthesia care unit      Implants: Depuy Attune RP  Femoral, size: 6  Tibial tray: 6  Polyethylene: 6  Patellar button: 38      73 y.o.female, presents at this time, secondary to treatment of left knee DJD with varus deformity and 10-15 degrees of flexion contracture, which has failed conservative treatment.    The patient was told of all the pros and cons of operative and nonoperative intervention. Some of the complications of operative intervention include infection, bleeding, scarring, nerve injury, vascular injury, fracture, continued pain, decreased range of motion, DVT, PE death, loss of limb, need for further surgery, not obtain an results. The patient wished. Patient did consent for operative intervention for this pathology.    Patient was brought to the operating room. Patient was anesthetized as anesthesia team. Patient was prepped and draped in normal sterile fashion. After this was done, we did conduct a time out to make sure correct. Patient was in the room, prepped marked and draped. Implants were in the room, Rep. For the implants were present, DVT prophylaxis and antibiotics were addressed.    Midline incision was made over the anterior knee after going through skin, fatty tissue, fascia was identified. Flaps were created both medially and laterally. Medial parapatellar incision was made. Excision of Hoffa fat pad was conducted. At this time because this was a varus knee, we did release to expose the medial and lateral plateaus but no ligamentous releasing was done. We're able to excise the fatty tissue from  the anterior aspect of the distal femur. We were able to at this time, flex the knee with the patella everted. Intramedullary reamer was used. Intramedullary guide for the femur was then placed to determine how much of the distal femur to cut and also, valgus cut angle. Pins were then placed. Intramedullary guide was removed. Distal femoral cut was made.  Attention was now to the proximal tibia. Extramedullary guide was used. After making sure that we took the appropriate amount from the medial and lateral side with the aid of a measuring device, the jig was held in place with pins. Protection of the medial and lateral ligaments, as well as the popliteal region, was done. Proximal tibial cut was made. Extension gaps were evaluated.  Size of the femur was then determined with the aid of a guide. After this was done, we placed the appropriate sized block. These were held down with pins. Anterior and posterior cuts were made. Anterior, posterior, chamfer cuts were made. We did check our flexion gap and was noted to be appropriate. This was a PCL sacrificing device being used Therefore a box cut was made.  Tibial tray size was determined. This was held down with 2 small screws. The reamer and the punch was then used with the appropriate guide to make sure that these were all done, the appropriate manner. Guide was removed. Trial femoral component was placed. Trial tibial polyethylene was placed. Patient was noted to be stable. On coronal and sagittal planes.   Patellar button size was determined after the articular surface of the patella was excised. The patella button was placed on the medial aspect of the patella. Holes were drilled for our true patella button to be cemented on. We tracked the knee, and we noted that the patella was tracking appropriately over the trochlear of the trial implants. After this was done we did drill holes in our trial femoral component. We then removed. All trial components.  Copious  irrigation was used at the operative site. We did place some bone within the intramedullary canal of the femur. High viscocity cement was used and all components were placed, including the femur, tibia, and patella button. A trial tibial Polyethylene was placed. After cement had dried, removed the trial polyethylene and removed any excess cement that was in the popliteal region. Copious irrigation was used. The tibial polyethylene was then placed. Patient was placed in the appropriate ranges of motion and patient's Knee was noted to be stable.  Tourniquet was discontinued. Hemostasis was obtained. #1 Vicryl sutures were used to reapproximate the parapatellar incision. 2-0 Vicryl sutures for the subcutaneous closure. This was reinforced with staples. Wounds were cleaned and dried. Acticoat, 4 x 4, ABDs and web roll was placed prior to Ace bandage be placed from the foot. All the way to the mid thigh region.  Patient was awakened as anesthesia team noted to be a stable condition and transferred to postanesthesia care unit

## 2024-05-30 NOTE — ANESTHESIA PROCEDURE NOTES
Peripheral Block    Patient location during procedure: holding area  Start time: 5/30/2024 11:42 AM  Reason for block: at surgeon's request and post-op pain management  Staffing  Performed by: Jimy Graham MD  Authorized by: Jimy Graham MD    Preanesthetic Checklist  Completed: patient identified, IV checked, site marked, risks and benefits discussed, surgical consent, monitors and equipment checked, pre-op evaluation and timeout performed  Peripheral Block  Patient position: right lateral  Prep: ChloraPrep  Patient monitoring: frequent blood pressure checks, continuous pulse oximetry and heart rate  Block type: IPACK  Laterality: left  Injection technique: single-shot  Procedures: ultrasound guided, Ultrasound guidance required for the procedure to increase accuracy and safety of medication placement and decrease risk of complications.  Ultrasound permanent image saved  bupivacaine (PF) (MARCAINE) 0.5 % injection 20 mL - Perineural   10 mL - 5/30/2024 11:46:00 AM  bupivacaine liposomal (EXPAREL) 1.3 % injection 20 mL - Perineural   5 mL - 5/30/2024 11:46:00 AM  midazolam (VERSED) injection 0.5 mg - Intravenous   2 mg - 5/30/2024 11:46:00 AM  Needle  Needle type: Stimuplex   Needle gauge: 22 G  Needle length: 6 in  Needle localization: anatomical landmarks and ultrasound guidance  Needle insertion depth: 15 cm  Assessment  Injection assessment: incremental injection, frequent aspiration, injected with ease, negative aspiration, negative for heart rate change, no paresthesia on injection, no symptoms of intraneural/intravenous injection and needle tip visualized at all times  Paresthesia pain: none  Post-procedure:  site cleaned  patient tolerated the procedure well with no immediate complications

## 2024-05-30 NOTE — ANESTHESIA PROCEDURE NOTES
Peripheral Block    Patient location during procedure: holding area  Start time: 5/30/2024 11:55 AM  Reason for block: at surgeon's request and post-op pain management  Staffing  Performed by: Jimy Graham MD  Authorized by: Jimy Graham MD    Preanesthetic Checklist  Completed: patient identified, IV checked, site marked, risks and benefits discussed, surgical consent, monitors and equipment checked, pre-op evaluation and timeout performed  Peripheral Block  Patient position: supine  Prep: ChloraPrep  Patient monitoring: frequent blood pressure checks, continuous pulse oximetry and heart rate  Block type: Adductor Canal  Laterality: left  Injection technique: single-shot  Procedures: ultrasound guided, Ultrasound guidance required for the procedure to increase accuracy and safety of medication placement and decrease risk of complications.  Ultrasound permanent image saved  bupivacaine (PF) (MARCAINE) 0.5 % injection 20 mL - Perineural   15 mL - 5/30/2024 11:55:00 AM  bupivacaine liposomal (EXPAREL) 1.3 % injection 20 mL - Perineural   15 mL - 5/30/2024 11:55:00 AM  Needle  Needle type: Stimuplex   Needle gauge: 22 G  Needle length: 4 in  Needle localization: anatomical landmarks and ultrasound guidance  Needle insertion depth: 8 cm  Assessment  Injection assessment: incremental injection, frequent aspiration, injected with ease, negative aspiration, negative for heart rate change, no paresthesia on injection, no symptoms of intraneural/intravenous injection and needle tip visualized at all times  Paresthesia pain: none  Post-procedure:  site cleaned  patient tolerated the procedure well with no immediate complications         No lymphadedenopathy

## 2024-05-31 ENCOUNTER — TELEPHONE (OUTPATIENT)
Age: 74
End: 2024-05-31

## 2024-05-31 VITALS
WEIGHT: 210 LBS | HEIGHT: 62 IN | TEMPERATURE: 97.5 F | OXYGEN SATURATION: 96 % | BODY MASS INDEX: 38.64 KG/M2 | DIASTOLIC BLOOD PRESSURE: 78 MMHG | SYSTOLIC BLOOD PRESSURE: 133 MMHG | RESPIRATION RATE: 16 BRPM | HEART RATE: 56 BPM

## 2024-05-31 LAB
ANION GAP SERPL CALCULATED.3IONS-SCNC: 6 MMOL/L (ref 4–13)
BUN SERPL-MCNC: 20 MG/DL (ref 5–25)
CALCIUM SERPL-MCNC: 9.3 MG/DL (ref 8.4–10.2)
CHLORIDE SERPL-SCNC: 105 MMOL/L (ref 96–108)
CO2 SERPL-SCNC: 28 MMOL/L (ref 21–32)
CREAT SERPL-MCNC: 0.5 MG/DL (ref 0.6–1.3)
ERYTHROCYTE [DISTWIDTH] IN BLOOD BY AUTOMATED COUNT: 13.2 % (ref 11.6–15.1)
GFR SERPL CREATININE-BSD FRML MDRD: 96 ML/MIN/1.73SQ M
GLUCOSE SERPL-MCNC: 173 MG/DL (ref 65–140)
HCT VFR BLD AUTO: 35.5 % (ref 34.8–46.1)
HGB BLD-MCNC: 11.7 G/DL (ref 11.5–15.4)
MCH RBC QN AUTO: 32.4 PG (ref 26.8–34.3)
MCHC RBC AUTO-ENTMCNC: 33 G/DL (ref 31.4–37.4)
MCV RBC AUTO: 98 FL (ref 82–98)
PLATELET # BLD AUTO: 273 THOUSANDS/UL (ref 149–390)
PMV BLD AUTO: 9.4 FL (ref 8.9–12.7)
POTASSIUM SERPL-SCNC: 4.5 MMOL/L (ref 3.5–5.3)
RBC # BLD AUTO: 3.61 MILLION/UL (ref 3.81–5.12)
SODIUM SERPL-SCNC: 139 MMOL/L (ref 135–147)
WBC # BLD AUTO: 12 THOUSAND/UL (ref 4.31–10.16)

## 2024-05-31 PROCEDURE — 97110 THERAPEUTIC EXERCISES: CPT

## 2024-05-31 PROCEDURE — 80048 BASIC METABOLIC PNL TOTAL CA: CPT | Performed by: ORTHOPAEDIC SURGERY

## 2024-05-31 PROCEDURE — 99024 POSTOP FOLLOW-UP VISIT: CPT | Performed by: PHYSICIAN ASSISTANT

## 2024-05-31 PROCEDURE — 97116 GAIT TRAINING THERAPY: CPT

## 2024-05-31 PROCEDURE — 85027 COMPLETE CBC AUTOMATED: CPT | Performed by: ORTHOPAEDIC SURGERY

## 2024-05-31 RX ORDER — OXYCODONE HYDROCHLORIDE 5 MG/1
5 TABLET ORAL EVERY 4 HOURS PRN
Qty: 40 TABLET | Refills: 0 | Status: SHIPPED | OUTPATIENT
Start: 2024-05-31

## 2024-05-31 RX ADMIN — DOCUSATE SODIUM 100 MG: 100 CAPSULE, LIQUID FILLED ORAL at 09:40

## 2024-05-31 RX ADMIN — CEFAZOLIN SODIUM 1000 MG: 1 SOLUTION INTRAVENOUS at 04:10

## 2024-05-31 RX ADMIN — METOPROLOL SUCCINATE 50 MG: 50 TABLET, EXTENDED RELEASE ORAL at 09:40

## 2024-05-31 RX ADMIN — ENOXAPARIN SODIUM 40 MG: 40 INJECTION SUBCUTANEOUS at 01:42

## 2024-05-31 RX ADMIN — LEVOTHYROXINE SODIUM 88 MCG: 88 TABLET ORAL at 05:17

## 2024-05-31 RX ADMIN — PANTOPRAZOLE SODIUM 40 MG: 40 TABLET, DELAYED RELEASE ORAL at 05:17

## 2024-05-31 RX ADMIN — ACETAMINOPHEN 975 MG: 325 TABLET, FILM COATED ORAL at 09:40

## 2024-05-31 RX ADMIN — ACETAMINOPHEN 975 MG: 325 TABLET, FILM COATED ORAL at 01:42

## 2024-05-31 RX ADMIN — SODIUM CHLORIDE, SODIUM LACTATE, POTASSIUM CHLORIDE, AND CALCIUM CHLORIDE 75 ML/HR: .6; .31; .03; .02 INJECTION, SOLUTION INTRAVENOUS at 06:56

## 2024-05-31 RX ADMIN — OXYCODONE HYDROCHLORIDE 5 MG: 5 TABLET ORAL at 09:40

## 2024-05-31 NOTE — TELEPHONE ENCOUNTER
Spoke to patient. Reviewed dressing concerns, discussed re-applying the gauze and using the ACE over to secure it, and adjusting the ACE over the weekend as it loosens.     We discussed icing areas of pain and swelling over the weekend, and per AVS keeping dressing intact and incision  covered for 5-10 days.     pt encouraged to call me with questions, concerns or issues.

## 2024-05-31 NOTE — PLAN OF CARE
Problem: PHYSICAL THERAPY ADULT  Goal: Performs mobility at highest level of function for planned discharge setting.  See evaluation for individualized goals.  Description: Treatment/Interventions: Functional transfer training, LE strengthening/ROM, Elevations, Therapeutic exercise, Endurance training, Patient/family training, Equipment eval/education, Bed mobility, Gait training          See flowsheet documentation for full assessment, interventions and recommendations.  Outcome: Progressing  Note: Prognosis: Good  Problem List: Decreased strength, Decreased range of motion, Decreased endurance, Impaired balance, Decreased mobility, Pain, Orthopedic restrictions  Assessment: Orders for PT eval and treat received. Pt's PMHx: right posterior DL, anxiety, fibromyalgia, right TKA, lumbar spondylosis.  Pt exhibits physical deficits noted in problem list above.  Deficits listed contribute to functional limitations that are significant from the patient PLOF and include: difficulty with bed mobility, impaired standing balance, inability to perform safe transfers, tolerance for OOB functional activities, unsafe/inefficient ambulation, fall risk, and unable to safely manage stairs/steps/ramp    Additionally, patient is limited by restrictions/precautions/DME: left LE WBAT, anterior hip precaution.     During today's session, formal PT evaluation performed.  Initiated supine HEP, which pt responded well to.  Progressed to standing exercises which pt tolerated but c/o increased pain.  Pt exhibited good learning potential and control with transfer techniques.  Progressed ambulation slowly, and pt displayed ability to ambulate to/from bathroom with walker.  Pt is progressing with activity as expected, and can tolerate increased exercise and walking duration next session.     The AM-PAC & Barthel Index outcome tools were used to assist in determining pt safety w/ mobility/self care & appropriate d/c recommendations, see above  for scores. Patient's clinical presentation is evolving due to significant acute change in functional independence, uncontrolled pain, significant need for care assistance compared to baseline, recent surgery/procedure, and complicated social/support system.     Considering the patient's PLOF, co-morbidities, acute functional limitations, functional outcome measures, and/or goal to progress functional independence; this patient would benefit from skilled Physical Therapy intervention in the acute care setting.  Barriers to Discharge: Decreased caregiver support     Rehab Resource Intensity Level, PT: III (Minimum Resource Intensity)    See flowsheet documentation for full assessment.

## 2024-05-31 NOTE — OCCUPATIONAL THERAPY NOTE
Occupational Therapy Cx Note     Patient Name: Heather Holt  Today's Date: 5/31/2024  Problem List  Principal Problem:    Status post total knee replacement, left          05/31/24 1002   OT Last Visit   OT Visit Date 05/31/24   Note Type   Note type Cancelled Session   Additional Comments Contact made pt who was in recliner chair and fully clothed in street clothes. Pt reports that she independently got dressed today as well as managed her self care in bathroom. Pt reports no concern for ADL mgmt at home 2/2 experience with previous surgeries as well as spousal support. Reports that she also has all appropriate AD/DME at home. Pt reports no additional needs from OT at this time. Has outpt PT already scheduled. Will D/C inpatient OT.           Marissa Wood, OTR/L

## 2024-05-31 NOTE — PHYSICAL THERAPY NOTE
PHYSICAL THERAPY Evaluation and Treatment  DATE: 24  TIME:     NAME:  Heather Holt  AGE:   73 y.o.  Mrn:   [id number]  Length Of Stay: 0    ADMIT DX:  Primary osteoarthritis of left knee [M17.12]    Past Medical History:   Diagnosis Date    Anxiety     Fibromyalgia     H/O cardiovascular stress test     H/O echocardiogram     HBP (high blood pressure)     Hypothyroidism     IBS (irritable bowel syndrome)     PONV (postoperative nausea and vomiting)     Vascular disease     Varicose veins     Past Surgical History:   Procedure Laterality Date    CARDIAC CATHETERIZATION      EF 50%. No significant CAD.     SECTION      CHOLECYSTECTOMY      HERNIA REPAIR  2010    REPLACEMENT TOTAL KNEE Right 2013    TOTAL HIP ARTHROPLASTY Right 2019    VEIN SURGERY Bilateral      Dr Castrejon        24 1853   PT Last Visit   PT Visit Date 24   Note Type   Note type Evaluation   Additional Comments 74 y/o present for left TKA   Pain Assessment   Pain Assessment Tool 0-10   Pain Score 4   Pain Location/Orientation Orientation: Left;Location: Knee   Hospital Pain Intervention(s) Ambulation/increased activity;Repositioned   Restrictions/Precautions   Weight Bearing Precautions Per Order Yes   LLE Weight Bearing Per Order WBAT   Other Precautions WBS;Fall Risk;Pain  (lynn catheter)   Home Living   Type of Home House   Home Layout One level;Stairs to enter with rails   Bathroom Shower/Tub Walk-in shower   Bathroom Toilet Raised   Bathroom Equipment   (Pt has partial wall aside toilet that she can use for support. Reports that spouse is in the process of installing a GB.)   Home Equipment Walker;Cane  (cane at baseline)   Prior Function   Level of [place] Independent with ADLs;Independent with IADLS;Independent with functional mobility   Lives With Spouse   Vocational Retired   Cognition   Overall Cognitive Status WFL   Arousal/Participation Alert   Orientation Level Oriented X4    Subjective   Subjective Pt pleasant and agreeable to participate.  Pt reports minimal and controlled pain.  Reports her knee feels better when she is able to move it.   RUE Assessment   RUE Assessment WFL   LUE Assessment   LUE Assessment WFL   RLE Assessment   RLE Assessment WFL   LLE Assessment   LLE Assessment   (knee grossly 3+/5)   Coordination   Movements are Fluid and Coordinated 1   Sensation WFL   Bed Mobility   Supine to Sit 5  Supervision   Additional items Bedrails;HOB elevated;Increased time required   Transfers   Sit to Stand 4  Minimal assistance   Additional items Assist x 1;Increased time required;Verbal cues   Stand to Sit 5  Supervision   Additional items Assist x 1;Increased time required;Verbal cues   Ambulation/Elevation   Gait Assistance 5  Supervision   Additional items Assist x 1;Verbal cues;Tactile cues   Assistive Device Rolling walker   Distance 12'x2   Ambulation/Elevation Additional Comments Pt performed step-to gait pattern, taking short steps but good control and stability.  Cues for walker management and posture   Balance   Static Sitting Good   Dynamic Sitting Fair +   Static Standing Fair   Dynamic Standing Fair   Ambulatory Fair  (RW)   Activity Tolerance   Activity Tolerance Patient limited by pain   Medical Staff Made Aware OT Marissa   Nurse Made Aware Nursing aware of patient positioning and performance   Assessment   Prognosis Good   Problem List Decreased strength;Decreased range of motion;Decreased endurance;Impaired balance;Decreased mobility;Pain;Orthopedic restrictions   Assessment Orders for PT eval and treat received. Pt's PMHx: right posterior DL, anxiety, fibromyalgia, right TKA, lumbar spondylosis.  Pt exhibits physical deficits noted in problem list above.  Deficits listed contribute to functional limitations that are significant from the patient PLOF and include: difficulty with bed mobility, impaired standing balance, inability to perform safe transfers,  tolerance for OOB functional activities, unsafe/inefficient ambulation, fall risk, and unable to safely manage stairs/steps/ramp    Additionally, patient is limited by restrictions/precautions/DME: left LE WBAT, anterior hip precaution.     During today's session, formal PT evaluation performed.  Initiated supine HEP, which pt responded well to.  Progressed to standing exercises which pt tolerated but c/o increased pain.  Pt exhibited good learning potential and control with transfer techniques.  Progressed ambulation slowly, and pt displayed ability to ambulate to/from bathroom with walker.  Pt is progressing with activity as expected, and can tolerate increased exercise and walking duration next session.     The AM-PAC & Barthel Index outcome tools were used to assist in determining pt safety w/ mobility/self care & appropriate d/c recommendations, see above for scores. Patient's clinical presentation is evolving due to significant acute change in functional independence, uncontrolled pain, significant need for care assistance compared to baseline, recent surgery/procedure, and complicated social/support system.     Considering the patient's PLOF, co-morbidities, acute functional limitations, functional outcome measures, and/or goal to progress functional independence; this patient would benefit from skilled Physical Therapy intervention in the acute care setting.   Barriers to Discharge Decreased caregiver support   Goals   Patient Goals return to PLOF   STG Expiration Date 06/09/24   Short Term Goal #1 1: Pt will perform supine<>sit transfer on flat bed, mod I.  2: Pt will perform stand pivot transfer with RW, mod I.  3: Pt will ambulate 150' with reciprocal gait and appropriate pace using RW, mod I. 4: Pt will perform written HEP, mod I. 5: Pt will ascend/descend a curb step and/or stairs require to mobilize around the the patient's home with RW/rails, Sup A.   Plan   Treatment/Interventions Functional transfer  training;LE strengthening/ROM;Elevations;Therapeutic exercise;Endurance training;Patient/family training;Equipment eval/education;Bed mobility;Gait training   PT Frequency Twice a day   Discharge Recommendation   Rehab Resource Intensity Level, PT III (Minimum Resource Intensity)   AM-PAC Basic Mobility Inpatient   Turning in Flat Bed Without Bedrails 3   Lying on Back to Sitting on Edge of Flat Bed Without Bedrails 3   Moving Bed to Chair 3   Standing Up From Chair Using Arms 3   Walk in Room 3   Climb 3-5 Stairs With Railing 2   Basic Mobility Inpatient Raw Score 17   Basic Mobility Standardized Score 39.67   [facility] Highest Level Of Mobility   -[facility] Goal 5: Stand one or more mins   -[facility] Achieved 6: Walk 10 steps or more   Additional Treatment Session   Start Time 1903   End Time 1916   Treatment Assessment Discussed at length about pain management strategies, postural corrections, proper use of RW, gait/stair training corrections, importance of gaining AROM, benefit of frequent tolerable physical activity/exercise, and fall risk precautions.  Provided caregiver training and tips as appropriate and needed. Pt responded well to exercises, and displayed good stability with ambulation.  Pt should have no difficulty progressing activity for return to home, during next session.   Exercises   Quad Sets Supine;5 reps;AROM;Bilateral   Heelslides Supine;10 reps;AROM;Left   Marching Standing;10 reps;AROM;Right;Left  (RW)   Balance training  with RW: lateral weight shifting 10x, rose mary heel raises 10x   End of Consult   Patient Position at End of Consult Bedside chair;Bed/Chair alarm activated;All needs within reach   The patient's AM-PAC Basic Mobility Inpatient Short Form Raw Score is 17. A Raw score of greater than or equal to 16 suggests the patient may benefit from discharge to home. Please also refer to the recommendation of the Physical Therapist for safe discharge planning.    Jeremie Denis, PT, DPT  [place]  Licensure #[id number]

## 2024-05-31 NOTE — TELEPHONE ENCOUNTER
Caller: Heather     Doctor: Shannon    Reason for call: Just got home from having knee replacement surgery, and her bandages are all falling off. The ace bandage and the gauze- she can see the staples. She was told  to keep it in for 5-10 days but its not going to stay on through the night     Call back#: [phone]

## 2024-05-31 NOTE — CASE MANAGEMENT
Case Management Assessment & Discharge Planning Note    Patient name Heather Holt  Location /-01 MRN [id number]  : 1950 Date 2024       Current Admission Date: 2024  Current Admission Diagnosis:Status post total knee replacement, left   Patient Active Problem List    Diagnosis Date Noted Date Diagnosed    Status post total knee replacement, left 2024     Primary osteoarthritis of left knee 2023     Lumbar spondylosis 2023     Senile osteoporosis 2023     Undifferentiated connective tissue disease (HCC) 10/28/2021     Hypothyroidism 10/28/2021     Primary generalized (osteo)arthritis 10/28/2021     Fibromyalgia 10/28/2021     IBS (irritable bowel syndrome) 10/28/2021     GERD (gastroesophageal reflux disease) 10/28/2021     Subcutaneous mass 2019     Intervertebral disc disorder with radiculopathy of lumbar region      Primary osteoarthritis of right hip      Varicose veins of bilateral lower extremities with pain 2018     Morbid obesity due to excess calories (HCC) 2018     History of varicose vein stripping 2018     HTN 2015       LOS (days): 0  Geometric Mean LOS (GMLOS) (days):   Days to GMLOS:     OBJECTIVE:           Current admission status: Outpatient Surgery  Referral Reason: Other    Preferred Pharmacy:   [id number] - [address] [address]  Phone: [phone] Fax: [phone]    Primary Care Provider: Aiden Paredes MD    Primary Insurance: MEDICARE  Secondary Insurance: AET[employer]    ASSESSMENT:  Active Health Care Proxies       TonoalonsoJaquan [facility] Care Representative - Spouse   Primary Phone: [phone] (Mobile)  Home Phone: [phone]                 Readmission Root Cause  30 Day Readmission: No    Patient Information  Admitted from:: Home  Mental Status: Alert  During Assessment patient was accompanied by: Spouse  Assessment information provided by::  Patient  Primary Caregiver: Self  Support Systems: Spouse/significant other, Children, Family members  County of Residence: [place]  What city do you live in?: [place]  Home entry access options. Select all that apply.: Stairs (2 KADI from [place], 13 from the garage, 3 from laundry room)  Number of steps to enter home.: 2  Do the steps have railings?: Yes  Type of Current Residence: [facility]  Living Arrangements: Lives w/ Spouse/significant other  Is patient a ?: No    Activities of Daily Living Prior to Admission  Functional Status: Independent  Completes ADLs independently?: Yes  Ambulates independently?: Yes  Does patient use assisted devices?: Yes  Assisted Devices (DME) used: Walker, Straight Cane  Does patient currently own DME?: Yes  What DME does the patient currently own?: Shower Chair, Straight Cane, Walker, Other (Comment) (toilet riser)  Does patient have a history of Outpatient Therapy (PT/OT)?: Yes  Does the patient have a history of Short-Term Rehab?: No  Does patient have a history of HHC?: No  Does patient currently have HHC?: No    Patient Information Continued  Income Source: Pension/skilled nursing  Does patient have prescription coverage?: Yes  Does patient receive dialysis treatments?: No  Does patient have a history of substance abuse?: No  Does patient have a history of Mental Health Diagnosis?: No    Means of Transportation  Means of Transport to Appts:: Drives Self      Social Determinants of Health (SDOH)      Flowsheet Row Most Recent Value   Housing Stability    In the last 12 months, was there a time when you were not able to pay the mortgage or rent on time? N   In the past 12 months, how many times have you moved where you were living? 0   At any time in the past 12 months, were you homeless or living in a shelter (including now)? N   Transportation Needs    In the past 12 months, has lack of transportation kept you from medical appointments or from getting medications? no   In the  past 12 months, has lack of transportation kept you from meetings, work, or from getting things needed for daily living? No   Food Insecurity    Within the past 12 months, you worried that your food would run out before you got the money to buy more. Never true   Within the past 12 months, the food you bought just didn't last and you didn't have money to get more. Never true   Utilities    In the past 12 months has the electric, gas, oil, or water company threatened to shut off services in your home? No            DISCHARGE DETAILS:    Discharge planning discussed with:: Patient and her .  Freedom of Choice: Yes  Comments - Freedom of Choice: CM met with patient to introduce role and begin discharge planning. Patient is choosing to return home with spouse and family support as well as OP PT.  CM contacted family/caregiver?: Yes  Were Treatment Team discharge recommendations reviewed with patient/caregiver?: Yes  Did patient/caregiver verbalize understanding of patient care needs?: Yes  Were patient/caregiver advised of the risks associated with not following Treatment Team discharge recommendations?: Yes    Contacts  Patient Contacts: Jaquan Holt  Relationship to Patient:: Family  Contact Method: In Person  Reason/Outcome: Discharge Planning    Requested Home Health Care         Is the patient interested in HHC at discharge?: No    DME Referral Provided  Referral made for DME?: No    Other Referral/Resources/Interventions Provided:  Interventions: None Indicated  Referral Comments: Patient already has DME before the operation and is choosing to follow up with OP PT. Declined and referral needs.    Would you like to participate in our [facility] service program?  : No - Declined    Treatment Team Recommendation: Home  Discharge Destination Plan:: Home

## 2024-05-31 NOTE — PHYSICAL THERAPY NOTE
PHYSICAL THERAPY Treatment  DATE: 24  TIME:     NAME:  Heather Holt  AGE:   73 y.o.  Mrn:   [id number]  Length Of Stay: 0    ADMIT DX:  Primary osteoarthritis of left knee [M17.12]    Past Medical History:   Diagnosis Date    Anxiety     Fibromyalgia     H/O cardiovascular stress test     H/O echocardiogram     HBP (high blood pressure)     Hypothyroidism     IBS (irritable bowel syndrome)     PONV (postoperative nausea and vomiting)     Vascular disease     Varicose veins     Past Surgical History:   Procedure Laterality Date    CARDIAC CATHETERIZATION      EF 50%. No significant CAD.     SECTION      CHOLECYSTECTOMY      HERNIA REPAIR      PA ARTHRP KNE CONDYLE&PLATU MEDIAL&LAT COMPARTMENTS Left 2024    Procedure: ARTHROPLASTY KNEE TOTAL;  Surgeon: Sonia Real MD;  Location:  MAIN OR;  Service: Orthopedics    REPLACEMENT TOTAL KNEE Right 2013    TOTAL HIP ARTHROPLASTY Right 2019    VEIN SURGERY Bilateral      Dr Castrejon        24 0901   PT Last Visit   PT Visit Date 24   Note Type   Note Type Treatment   Pain Assessment   Pain Assessment Tool 0-10   Pain Score 4  (increased to a 6/10 with activity)   Restrictions/Precautions   Weight Bearing Precautions Per Order Yes   LLE Weight Bearing Per Order WBAT   Other Precautions Pain;Fall Risk;WBS   General   Chart Reviewed Yes   Family/Caregiver Present No   Cognition   Overall Cognitive Status WFL   Arousal/Participation Alert   Orientation Level Oriented X4   Subjective   Subjective Pt pleasant and agreeable.  Pt reports she didn't sleep well last night, but her pain is well controlled and ready to participate in therapy   Transfers   Sit to Stand 6  Modified independent   Additional items Verbal cues   Stand to Sit 6  Modified independent   Additional items Verbal cues   Ambulation/Elevation   Gait Assistance 5  Supervision  (progressed to Mod I)   Additional items Assist x 1;Verbal cues    Assistive Device Rolling walker   Distance 75'+100'   Stair Management Assistance 5  Supervision   Additional items Assist x 1;Verbal cues;Tactile cues   Stair Management Technique Two rails;One rail L;With walker   Number of Stairs 4   Ambulation/Elevation Additional Comments Pt progressed appropriate from step-to gait, to step-though gait, and eventually displayed reciprocal gait with RW.  pt exhibiting good step lengths and foot clearance, and pace.  Slight limitation in knee flexion and heel off during swing phase   Balance   Static Sitting Good   Dynamic Sitting Good   Static Standing Fair +   Dynamic Standing Fair +   Ambulatory Fair +  (RW)   Endurance Deficit   Endurance Deficit No   Activity Tolerance   Activity Tolerance Patient limited by pain   Nurse Made Aware Nursing notified of patient's performane   Exercises   Quad Sets Sitting;10 reps;AROM;Bilateral  (10 sec)   Heelslides Sitting;10 reps;AROM;Left   Marching Standing;10 reps;AROM;Right;Left  (RW)   Balance training  standing at RW: rose mary heel raises 10x   Assessment   Prognosis Good   Problem List Decreased strength;Decreased range of motion;Decreased endurance;Impaired balance;Decreased mobility;Pain;Orthopedic restrictions   Assessment Pt exhibits physical deficits noted in problem list above.  Deficits listed contribute to functional limitations that are significant from the patient PLOF and include: impaired standing balance, unsafe/inefficient ambulation, fall risk, and unable to safely manage stairs/steps/ramp    Additionally, patient is limited by restrictions/precautions/DME: LLE WBAT.     During today's session, reviewed HeP in sitting and again in standing with walker.  Pt tolerated and responded well to exercises, exhibiting good strength and motion.  Strategically progressed gait with walker, initially focusing on walker use and safety with step-to gait pattern, and eventually progressing to reciprocal gait.  Pt's pain increased during  ambulation, but managed appropriately.  Pt educated and instructed on proper stair training, displaying good understanding and carry over with use of rail and/or RW.  Pt is progressing well and should have no difficulty returning home once medically stable.     The AM-[facility] & Barthel Index outcome tools were used to assist in determining pt safety w/ mobility/self care & appropriate d/c recommendations, see above for scores. Patient's clinical presentation is stable  due to recent surgery/procedure.     Considering the patient's PLOF, co-morbidities, acute functional limitations, functional outcome measures, and/or goal to progress functional independence; this patient would continue to benefit from skilled Physical Therapy intervention in the acute care setting.   Barriers to Discharge None   Goals   STG Expiration Date 06/09/24   Short Term Goal #1 1: Pt will perform supine<>sit transfer on flat bed, mod I. 2: Pt will perform stand pivot transfer with RW, mod I. 3: Pt will ambulate 150' with reciprocal gait and appropriate pace using RW, mod I. 4: Pt will perform written HEP, mod I. 5: Pt will ascend/descend a curb step and/or stairs require to mobilize around the the patient's home with RW/rails, Sup A.   Plan   Treatment/Interventions Functional transfer training;LE strengthening/ROM;Elevations;Therapeutic exercise;Endurance training;Patient/family training;Equipment eval/education;Bed mobility;Gait training   PT Frequency Twice a day   Discharge Recommendation   Rehab Resource Intensity Level, PT III (Minimum Resource Intensity)   AM-PAC Basic Mobility Inpatient   Turning in Flat Bed Without Bedrails 4   Lying on Back to Sitting on Edge of Flat Bed Without Bedrails 3   Moving Bed to Chair 4   Standing Up From Chair Using Arms 4   Walk in Room 4   Climb 3-5 Stairs With Railing 3   Basic Mobility Inpatient Raw Score 22   Basic Mobility Standardized Score 47.4   [facility] Highest Level Of Mobility   [facility] Goal  7: Walk 25 feet or more   -HLM Achieved 7: Walk 25 feet or more   Education   Education Provided Mobility training;Assistive device;Home exercise program   Patient Demonstrates acceptance/verbal understanding;Demonstrates verbal understanding   End of Consult   Patient Position at End of Consult Bedside chair;All needs within reach;Bed/Chair alarm activated     The patient's AM-PAC Basic Mobility Inpatient Short Form Raw Score is 22. A Raw score of greater than or equal to 16 suggests the patient may benefit from discharge to home. Please also refer to the recommendation of the Physical Therapist for safe discharge planning.    Jeremie Denis, PT, DPT  [place] Licensure #[id number]

## 2024-05-31 NOTE — PLAN OF CARE
Problem: PAIN - ADULT  Goal: Verbalizes/displays adequate comfort level or baseline comfort level  Description: Interventions:  - Encourage patient to monitor pain and request assistance  - Assess pain using appropriate pain scale  - Administer analgesics based on type and severity of pain and evaluate response  - Implement non-pharmacological measures as appropriate and evaluate response  - Consider cultural and social influences on pain and pain management  - Notify physician/advanced practitioner if interventions unsuccessful or patient reports new pain  Outcome: Not Progressing     Problem: SAFETY ADULT  Goal: Patient will remain free of falls  Description: INTERVENTIONS:  - Educate patient/family on patient safety including physical limitations  - Instruct patient to call for assistance with activity   - Consult OT/PT to assist with strengthening/mobility   - Keep Call bell within reach  - Keep bed low and locked with side rails adjusted as appropriate  - Keep care items and personal belongings within reach  - Initiate and maintain comfort rounds  - Make Fall Risk Sign visible to staff  - Offer Toileting every prn Hours, in advance of need  - Initiate/Maintain bed alarm  - Obtain necessary fall risk management equipment: n/a  - Apply yellow socks and bracelet for high fall risk patients  - Consider moving patient to room near nurses station  Outcome: Not Progressing  Goal: Maintain or return to baseline ADL function  Description: INTERVENTIONS:  -  Assess patient's ability to carry out ADLs; assess patient's baseline for ADL function and identify physical deficits which impact ability to perform ADLs (bathing, care of mouth/teeth, toileting, grooming, dressing, etc.)  - Assess/evaluate cause of self-care deficits   - Assess range of motion  - Assess patient's mobility; develop plan if impaired  - Assess patient's need for assistive devices and provide as appropriate  - Encourage maximum independence but  intervene and supervise when necessary  - Involve family in performance of ADLs  - Assess for home care needs following discharge   - Consider OT consult to assist with ADL evaluation and planning for discharge  - Provide patient education as appropriate  Outcome: Not Progressing  Goal: Maintains/Returns to pre admission functional level  Description: INTERVENTIONS:  - Perform AM-PAC 6 Click Basic Mobility/ Daily Activity assessment daily.  - Set and communicate daily mobility goal to care team and patient/family/caregiver.   - Collaborate with rehabilitation services on mobility goals if consulted  - Perform Range of Motion prn times a day.  - Reposition patient every self hours.  - Dangle patient prn times a day  - Stand patient prn times a day  - Ambulate patient prn times a day  - Out of bed to chair prn times a day   - Out of bed for meals prn times a day  - Out of bed for toileting  - Record patient progress and toleration of activity level   Outcome: Not Progressing     Problem: DISCHARGE PLANNING  Goal: Discharge to home or other facility with appropriate resources  Description: INTERVENTIONS:  - Identify barriers to discharge w/patient and caregiver  - Arrange for needed discharge resources and transportation as appropriate  - Identify discharge learning needs (meds, wound care, etc.)  - Arrange for interpretive services to assist at discharge as needed  - Refer to Case Management Department for coordinating discharge planning if the patient needs post-hospital services based on physician/advanced practitioner order or complex needs related to functional status, cognitive ability, or social support system  Outcome: Not Progressing     Problem: Knowledge Deficit  Goal: Patient/family/caregiver demonstrates understanding of disease process, treatment plan, medications, and discharge instructions  Description: Complete learning assessment and assess knowledge base.  Interventions:  - Provide teaching at level  of understanding  - Provide teaching via preferred learning methods  Outcome: Not Progressing

## 2024-05-31 NOTE — OCCUPATIONAL THERAPY NOTE
Occupational Therapy Evaluation     Patient Name: Heather Holt  Today's Date: 2024  Problem List  Principal Problem:    Status post total knee replacement, left    Past Medical History  Past Medical History:   Diagnosis Date    Anxiety     Fibromyalgia     H/O cardiovascular stress test     H/O echocardiogram 2017    HBP (high blood pressure)     Hypothyroidism     IBS (irritable bowel syndrome)     PONV (postoperative nausea and vomiting)     Vascular disease     Varicose veins     Past Surgical History  Past Surgical History:   Procedure Laterality Date    CARDIAC CATHETERIZATION      EF 50%. No significant CAD.     SECTION      CHOLECYSTECTOMY      HERNIA REPAIR  2010    REPLACEMENT TOTAL KNEE Right 2013    TOTAL HIP ARTHROPLASTY Right 2019    VEIN SURGERY Bilateral      Dr Castrejon         24 [id number]   OT Last Visit   OT Visit Date 24   Note Type   Note type Evaluation   Pain Assessment   Pain Assessment Tool Root-Baker FACES   Root-Baker FACES Pain Rating 4   Pain Location/Orientation Orientation: Left;Location: Knee   Patient's Stated Pain Goal No pain   Restrictions/Precautions   Weight Bearing Precautions Per Order No   Other Precautions Fall Risk;Pain   Home Living   Type of Home House   Home Layout One level;Stairs to enter with rails   Bathroom Shower/Tub Walk-in shower   Bathroom Toilet Raised   Bathroom Equipment   (Pt has partial wall aside toilet that she can use for support. Reports that spouse is in the process of installing a GB.)   Bathroom Accessibility Accessible   Home Equipment Walker;Cane  (Pt was using a cane at baseline.)   Prior Function   Level of [place] Independent with ADLs;Independent with functional mobility   Lives With Spouse   IADLs Family/Friend/Other provides transportation   Vocational Retired   Subjective   Subjective Pt received in supine. Pt agreeable to session.   ADL   Eating Assistance 7  Independent   Grooming Assistance 7   Independent   UB Bathing Assistance 5  Supervision/Setup   LB Bathing Assistance 4  Minimal Assistance   UB Dressing Assistance 5  Supervision/Setup   LB Dressing Assistance 3  Moderate Assistance   Toileting Assistance  4  Minimal Assistance   Bed Mobility   Supine to Sit 5  Supervision   Additional items LE management;Increased time required;Bedrails   Additional Comments Pt remained seated in recliner by end of session.   Transfers   Sit to Stand 4  Minimal assistance   Additional items Assist x 1;Verbal cues;Bedrails   Stand to Sit 5  Supervision   Additional items Verbal cues;Armrests   Toilet transfer 4  Minimal assistance   Additional items Assist x 1;Standard toilet;Increased time required  (grab bar)   Functional Mobility   Functional Mobility 4  Minimal assistance   Additional Comments RW   Balance   Static Sitting Good   Dynamic Sitting Fair +   Static Standing Fair   Dynamic Standing Fair   Activity Tolerance   Activity Tolerance Patient limited by fatigue   Medical Staff Made Aware PT Jesus PHAN Assessment   RUE Assessment WFL   LUE Assessment   LUE Assessment WFL   Cognition   Overall Cognitive Status WFL   Arousal/Participation Alert   Attention Within functional limits   Orientation Level Oriented X4   Memory Within functional limits   Following Commands Follows all commands and directions without difficulty   Assessment   Limitation Decreased ADL status;Decreased self-care trans;Decreased high-level ADLs   Prognosis Good   Assessment Pt is a 73 y.o. female seen for OT evaluation at [facility], admitted 5/30/2024 w/ Status post total knee replacement, left.  Comorbidities affecting pt's functional performance at time of assessment include: morbid obesity, firbromyalgia, HTN, etc see chart.  Prior to admission, pt was living with spouse in a house with steps to manage.  Pt was I w/  ADLS, & required cane PTA. Upon evaluation, pt appears to be performing below baseline functional status.  Pt currently requires sup for bed mobility, sup-min A for functional mobility/transfers, sup for UB ADLs and min-mod A for LB ADLS 2* the following deficits impacting occupational performance: weakness, decreased ROM, decreased strength, decreased balance, and increased pain. Full objective findings from OT assessment regarding body systems outlined above. Personal factors affecting pt at time of IE include:steps to enter environment, difficulty performing ADLS, difficulty performing IADLS , and decreased functional mobility . These impairments, as well as risk for falls  limit pt's ability to safely engage in all baseline areas of occupation and mobility. Pt to benefit from continued skilled OT tx while in the hospital to address deficits as defined above and maximize level of functional independence w ADL's and functional mobility. Occupational Performance areas to address include: bathing/shower, toilet hygiene, dressing, and functional mobility.  This evaluation required an extensive review of medical and/or therapy records and additional review of physical, cognitive and psychosocial history related to functional performance. Based upon functional performance deficits and assessments, this evaluation has been identified as a high complexity evaluation.  At this time, OT recommendations at time of discharge are home with no OT. Recommend various family support upon D/C.   Goals   Patient Goals Pt wishes to get better   Plan   Treatment Interventions ADL retraining;Functional transfer training;Compensatory technique education;Patient/family training;Equipment evaluation/education   Goal Expiration Date 06/09/24   OT Treatment Day 0   OT Frequency 2-3x/wk   Discharge Recommendation   Rehab Resource Intensity Level, OT No post-acute rehabilitation needs  (Pt endorsed various family support upon D/C.)   Additional Comments  The patient's raw score on the AM-PAC Daily Activity inpatient short form is 21, standardized  score is 44.27, greater than 39.4. Patients at this level are likely to benefit from DC to home. However please refer to therapist recommendation for discharge planning given other factors that may influence destination.   AM-PAC Daily Activity Inpatient   Lower Body Dressing 3   Bathing 3   Toileting 3   Upper Body Dressing 4   Grooming 4   Eating 4   Daily Activity Raw Score 21   Daily Activity Standardized Score (Calc for Raw Score >=11) 44.27   AM-PAC Applied Cognition Inpatient   Following a Speech/Presentation 4   Understanding Ordinary Conversation 4   Taking Medications 4   Remembering Where Things Are Placed or Put Away 4   Remembering List of 4-5 Errands 4   Taking Care of Complicated Tasks 4   Applied Cognition Raw Score 24   Applied Cognition Standardized Score 62.21   End of Consult   Education Provided Yes   Patient Position at End of Consult Bedside chair;Bed/Chair alarm activated;All needs within reach   Nurse Communication Nurse aware of consult         Pt will achieve the following goals within 10 days.    *Pt will complete UB bathing and dressing with mod I.    *Pt will complete LB bathing and dressing with mod I .    * Pt will complete toileting w/ mod I w/ G hygiene/thoroughness using DME PRN    *Pt will complete bed mobility with mod I, with bed flat and no side rail to prep for purposeful tasks    *Pt will perform functional transfers with on/off all surfaces with mod I using DME as needed w/ G balance/safety.    *Pt will increase static/dynamic sitting balance to good to improve the ability to sit at edge of bed or on a chair for ADLS;      *Pt will increase static/dynamic standing balance to F+ to improve postural stability and decrease fall risk during standing ADLS and transfers.       *Pt will improve functional mobility during ADL/IADL/leisure tasks to mod I using DME as needed w/ G balance/safety.           Marissa Wood, OTR/L

## 2024-05-31 NOTE — PLAN OF CARE
Problem: OCCUPATIONAL THERAPY ADULT  Goal: Performs self-care activities at highest level of function for planned discharge setting.  See evaluation for individualized goals.  Description: Treatment Interventions: ADL retraining, Functional transfer training, Compensatory technique education, Patient/family training, Equipment evaluation/education          See flowsheet documentation for full assessment, interventions and recommendations.   Note: Limitation: Decreased ADL status, Decreased self-care trans, Decreased high-level ADLs  Prognosis: Good  Assessment: Pt is a 73 y.o. female seen for OT evaluation at [facility], admitted 5/30/2024 w/ Status post total knee replacement, left.  Comorbidities affecting pt's functional performance at time of assessment include: morbid obesity, firbromyalgia, HTN, etc see chart.  Prior to admission, pt was living with spouse in a house with steps to manage.  Pt was I w/  ADLS, & required cane PTA. Upon evaluation, pt appears to be performing below baseline functional status. Pt currently requires sup for bed mobility, sup-min A for functional mobility/transfers, sup for UB ADLs and min-mod A for LB ADLS 2* the following deficits impacting occupational performance: weakness, decreased ROM, decreased strength, decreased balance, and increased pain. Full objective findings from OT assessment regarding body systems outlined above. Personal factors affecting pt at time of IE include:steps to enter environment, difficulty performing ADLS, difficulty performing IADLS , and decreased functional mobility . These impairments, as well as risk for falls  limit pt's ability to safely engage in all baseline areas of occupation and mobility. Pt to benefit from continued skilled OT tx while in the hospital to address deficits as defined above and maximize level of functional independence w ADL's and functional mobility. Occupational Performance areas to address include:  bathing/shower, toilet hygiene, dressing, and functional mobility.  This evaluation required an extensive review of medical and/or therapy records and additional review of physical, cognitive and psychosocial history related to functional performance. Based upon functional performance deficits and assessments, this evaluation has been identified as a high complexity evaluation.  At this time, OT recommendations at time of discharge are home with no OT. Recommend various family support upon D/C.     Rehab Resource Intensity Level, OT: No post-acute rehabilitation needs (Pt endorsed various family support upon D/C.)

## 2024-05-31 NOTE — PROGRESS NOTES
Progress Note - Orthopedics   Heather Holt 73 y.o. female MRN: [id number]  Unit/Bed#: -01      Subjective:    73 y.o.female s/p left TKA on 5/30 with Dr. Real. No acute events, no new complaints. Pain well controlled. Denies fevers, chills, CP, SOB, N/V, numbness or tingling. Patient reports no issues with urination or bowel movements. Patient states she ambulated yesterday and she is doing exercises in her bed.    Labs:  0   Lab Value Date/Time    HCT 35.5 05/31/2024 0533    HCT 41.9 05/03/2024 0841    HCT 40.2 07/27/2023 0751    HCT 40.2 11/21/2015 0509    HCT 41.4 11/20/2015 1729    HGB 11.7 05/31/2024 0533    HGB 13.6 05/03/2024 0841    HGB 13.0 07/27/2023 0751    HGB 13.1 11/21/2015 0509    HGB 14.0 11/20/2015 1729    INR 1.04 05/03/2024 0841    INR 1.09 11/20/2015 1729    WBC 12.00 (H) 05/31/2024 0533    WBC 5.50 05/03/2024 0841    WBC 5.17 07/27/2023 0751    WBC 5.44 11/21/2015 0509    WBC 6.43 11/20/2015 1729    ESR 27 07/27/2023 0751    CRP <3.0 03/08/2023 1308       Meds:    Current Facility-Administered Medications:     acetaminophen (TYLENOL) tablet 975 mg, 975 mg, Oral, Q8H, Sonia Real MD, 975 mg at 05/31/24 0142    calcium carbonate (TUMS) chewable tablet 1,000 mg, 1,000 mg, Oral, Daily PRN, Sonia Real MD    docusate sodium (COLACE) capsule 100 mg, 100 mg, Oral, BID, Sonia Real MD, 100 mg at 05/30/24 1731    enoxaparin (LOVENOX) subcutaneous injection 40 mg, 40 mg, Subcutaneous, Daily, Sonia Real MD, 40 mg at 05/31/24 0142    HYDROmorphone (DILAUDID) injection 0.5 mg, 0.5 mg, Intravenous, Q2H PRN, Sonia Real MD    lactated ringers bolus 1,000 mL, 1,000 mL, Intravenous, Once PRN **AND** lactated ringers bolus 1,000 mL, 1,000 mL, Intravenous, Once PRN, Sonia Real MD    lactated ringers infusion, 75 mL/hr, Intravenous, Continuous, Sonia Real MD, Last Rate: 75 mL/hr at 05/31/24 0656, 75 mL/hr at 05/31/24 0656    latanoprost (XALATAN)  "0.005 % ophthalmic solution 1 drop, 1 drop, Both Eyes, HS, Sonia Real MD, 1 drop at 05/30/24 2233    levothyroxine tablet 88 mcg, 88 mcg, Oral, Early Morning, Sonia Real MD, 88 mcg at 05/31/24 0517    losartan (COZAAR) tablet 100 mg, 100 mg, Oral, HS, Sonia Real MD    metoprolol succinate (TOPROL-XL) 24 hr tablet 50 mg, 50 mg, Oral, Daily, Sonia Real MD    oxyCODONE (ROXICODONE) immediate release tablet 10 mg, 10 mg, Oral, Q4H PRN, Sonia Real MD    oxyCODONE (ROXICODONE) IR tablet 5 mg, 5 mg, Oral, Q4H PRN, Sonia Real MD, 5 mg at 05/30/24 1731    pantoprazole (PROTONIX) EC tablet 40 mg, 40 mg, Oral, Early Morning, Sonia Real MD, 40 mg at 05/31/24 0517    sodium chloride 0.9 % bolus 1,000 mL, 1,000 mL, Intravenous, Once PRN **AND** sodium chloride 0.9 % bolus 1,000 mL, 1,000 mL, Intravenous, Once PRN, Sonia Real MD    Blood Culture:   No results found for: \"BLOODCX\"    Wound Culture:   No results found for: \"WOUNDCULT\"    Ins and Outs:  I/O last 24 hours:  In: 2480 [P.O.:480; I.V.:2000]  Out: 1050 [Urine:1000; Blood:50]          Physical:  Vitals:    05/31/24 0711   BP: 133/78   Pulse: 56   Resp: 16   Temp: 97.5 °F (36.4 °C)   SpO2: 96%     Musculoskeletal: left Lower Extremity    Dressing c/d/i  Thigh supple, nontender  Sensation intact to saphenous, sural, tibial, superficial peroneal nerve, and deep peroneal  Motor intact to +FHL/EHL, +ankle dorsi/plantar flexion  2+ DP pulse  Digits warm and well perfused  Capillary refill < 2 seconds    Assessment:    73 y.o.female s/p left TKA on 5/30 .     Plan:  WBAT LLE  Will monitor for ABLA and administer IVF/prbc as indicated for Greater than 2 gram drop or Hgb < 7 Hg 11.7  PT/OT  Pain control  DVT ppx aspirin on discharge  Dispo: discharge pending PT today    Josue Tran PA-C      "

## 2024-05-31 NOTE — PLAN OF CARE
Problem: PAIN - ADULT  Goal: Verbalizes/displays adequate comfort level or baseline comfort level  Description: Interventions:  - Encourage patient to monitor pain and request assistance  - Assess pain using appropriate pain scale  - Administer analgesics based on type and severity of pain and evaluate response  - Implement non-pharmacological measures as appropriate and evaluate response  - Consider cultural and social influences on pain and pain management  - Notify physician/advanced practitioner if interventions unsuccessful or patient reports new pain  Outcome: Progressing     Problem: INFECTION - ADULT  Goal: Absence or prevention of progression during hospitalization  Description: INTERVENTIONS:  - Assess and monitor for signs and symptoms of infection  - Monitor lab/diagnostic results  - Monitor all insertion sites, i.e. indwelling lines, tubes, and drains  - Monitor endotracheal if appropriate and nasal secretions for changes in amount and color  - [place] appropriate cooling/warming therapies per order  - Administer medications as ordered  - Instruct and encourage patient and family to use good hand hygiene technique  - Identify and instruct in appropriate isolation precautions for identified infection/condition  Outcome: Progressing  Goal: Absence of fever/infection during neutropenic period  Description: INTERVENTIONS:  - Monitor WBC    Outcome: Progressing     Problem: SAFETY ADULT  Goal: Patient will remain free of falls  Description: INTERVENTIONS:  - Educate patient/family on patient safety including physical limitations  - Instruct patient to call for assistance with activity   - Consult OT/PT to assist with strengthening/mobility   - Keep Call bell within reach  - Keep bed low and locked with side rails adjusted as appropriate  - Keep care items and personal belongings within reach  - Initiate and maintain comfort rounds  - Make Fall Risk Sign visible to staff  - Offer Toileting every  Hours,  in advance of need  - Initiate/Maintain alarm  - Obtain necessary fall risk management equipment:   - Apply yellow socks and bracelet for high fall risk patients  - Consider moving patient to room near nurses station  Outcome: Progressing  Goal: Maintain or return to baseline ADL function  Description: INTERVENTIONS:  -  Assess patient's ability to carry out ADLs; assess patient's baseline for ADL function and identify physical deficits which impact ability to perform ADLs (bathing, care of mouth/teeth, toileting, grooming, dressing, etc.)  - Assess/evaluate cause of self-care deficits   - Assess range of motion  - Assess patient's mobility; develop plan if impaired  - Assess patient's need for assistive devices and provide as appropriate  - Encourage maximum independence but intervene and supervise when necessary  - Involve family in performance of ADLs  - Assess for home care needs following discharge   - Consider OT consult to assist with ADL evaluation and planning for discharge  - Provide patient education as appropriate  Outcome: Progressing  Goal: Maintains/Returns to pre admission functional level  Description: INTERVENTIONS:  - Perform AM-PAC 6 Click Basic Mobility/ Daily Activity assessment daily.  - Set and communicate daily mobility goal to care team and patient/family/caregiver.   - Collaborate with rehabilitation services on mobility goals if consulted  - Perform Range of Motion  times a day.  - Reposition patient every  hours.  - Dangle patient  times a day  - Stand patient  times a day  - Ambulate patient  times a day  - Out of bed to chair  times a day   - Out of bed for meals times a day  - Out of bed for toileting  - Record patient progress and toleration of activity level   Outcome: Progressing     Problem: DISCHARGE PLANNING  Goal: Discharge to home or other facility with appropriate resources  Description: INTERVENTIONS:  - Identify barriers to discharge w/patient and caregiver  - Arrange for  needed discharge resources and transportation as appropriate  - Identify discharge learning needs (meds, wound care, etc.)  - Arrange for interpretive services to assist at discharge as needed  - Refer to Case Management Department for coordinating discharge planning if the patient needs post-hospital services based on physician/advanced practitioner order or complex needs related to functional status, cognitive ability, or social support system  Outcome: Progressing     Problem: Knowledge Deficit  Goal: Patient/family/caregiver demonstrates understanding of disease process, treatment plan, medications, and discharge instructions  Description: Complete learning assessment and assess knowledge base.  Interventions:  - Provide teaching at level of understanding  - Provide teaching via preferred learning methods  Outcome: Progressing

## 2024-06-03 ENCOUNTER — EVALUATION (OUTPATIENT)
Dept: PHYSICAL THERAPY | Facility: MEDICAL CENTER | Age: 74
End: 2024-06-03
Payer: MEDICARE

## 2024-06-03 DIAGNOSIS — M25.562 CHRONIC PAIN OF LEFT KNEE: ICD-10-CM

## 2024-06-03 DIAGNOSIS — G89.29 CHRONIC PAIN OF LEFT KNEE: ICD-10-CM

## 2024-06-03 DIAGNOSIS — Z96.652 HISTORY OF TOTAL KNEE REPLACEMENT, LEFT: Primary | ICD-10-CM

## 2024-06-03 PROCEDURE — 97164 PT RE-EVAL EST PLAN CARE: CPT | Performed by: PHYSICAL THERAPIST

## 2024-06-03 PROCEDURE — 97110 THERAPEUTIC EXERCISES: CPT | Performed by: PHYSICAL THERAPIST

## 2024-06-03 PROCEDURE — 97140 MANUAL THERAPY 1/> REGIONS: CPT | Performed by: PHYSICAL THERAPIST

## 2024-06-03 NOTE — PROGRESS NOTES
PT Re-Evaluation     Today's date: 6/3/2024  Patient name: Heather Holt  : 1950  MRN: [id number]  Referring provider: Sonia Real MD  Dx:   Encounter Diagnosis     ICD-10-CM    1. History of total knee replacement, left  Z96.652       2. Chronic pain of left knee  M25.562     G89.29                      Assessment  Impairments: abnormal gait, abnormal muscle firing, abnormal or restricted ROM, activity intolerance, impaired physical strength, lacks appropriate home exercise program, pain with function, weight-bearing intolerance, poor posture  and poor body mechanics    Assessment details: Pt is an alert and oriented 72 yo female, referred to out-pt PT for her first post-op appt for L TKA performed on 24.  Pt states having low HR and mild fever post-op but has subsided.   Pt states having long h/o knee pain, that is worse with weight bearing activities.   Pt also states having a fall OOB on Friday, and states her L knee got stuck underneath her and has been having more soreness since.  Upon reassessment, pt demonstrates pain, tissue tenderness, decreased L knee strength and ROM, mild seeping and scabbing along distal incision site, (+) swelling and bruising, gait deficits, and functional limitations.  Reviewed post-op guidelines and rehabilitation and icing.  Pt will benefit from skilled PT to address the deficits listed above and maximize function.  Thank you for your referral.  Understanding of Dx/Px/POC: good     Prognosis: good    Goals  ST: Decrease pain by 25% in 4 weeks with all functional activities  2: Improved ROM by 25% in 4 weeks to assist with all functional activities  3: Improve strength by 1/2 grade in 4 weeks to assist with all functional activities  LT:  Decrease pain to 0-1/10 with all functional activities in 4-6 weeks  2: Improved ROM to WFL's in 4-6 weeks to assist with all functional activities  3: Increase strength to WFL's in 4-6 weeks to assist with all  functional activities     Plan  Patient would benefit from: PT eval and skilled physical therapy  Planned modality interventions: cryotherapy    Planned therapy interventions: IASTM, joint mobilization, manual therapy, neuromuscular re-education, muscle pump exercises, patient education, postural training, strengthening, stretching, therapeutic activities, therapeutic exercise, IADL retraining, home exercise program, functional ROM exercises, flexibility, gait training and balance/weight bearing training    Frequency: 2x week  Duration in weeks: 6  Plan of Care beginning date: 6/3/2024  Plan of Care expiration date: 2024  Treatment plan discussed with: patient      Subjective Evaluation    History of Present Illness  Mechanism of injury: surgery  Mechanism of injury: L TKA 24  Patient Goals  Patient goals for therapy: increased strength, independence with ADLs/IADLs, return to sport/leisure activities, increased motion, improved balance and decreased pain    Pain  Current pain ratin  At best pain ratin  At worst pain ratin  Location: L knee  Quality: dull ache  Relieving factors: medications and ice  Aggravating factors: walking, standing and stair climbing    Social Support  Steps to enter house: yes  2  Stairs in house: no   Lives in: one-story house  Lives with: spouse      Diagnostic Tests  X-ray: abnormal  Treatments  Current treatment: physical therapy      Objective     Observations     Additional Observation Details  Observation:  74 yo female, mod genu valgum L LE, mild quad atrophy, L incision site staples intact, mild seeping distal incision site, mild scabbing throughout incision site, (+) ecchymosis L knee, (+) swelling    Gait:  Pt amb ind with RW, antalgic gait,  decreased opal, step-to gait on L    Palpation     Additional Palpation Details  Palpation:  mild tenderness med>lat knee joint line, med hamstring tendons    Neurological Testing     Sensation     Knee   Left Knee  "  Intact: Light touch    Active Range of Motion   Left Knee   Flexion: 90 degrees with pain  Extension: 2 degrees with pain    Passive Range of Motion   Left Knee   Flexion: 95 degrees with pain  Extension: 0 degrees with pain    Strength/Myotome Testing     Left Knee   Flexion: 3-  Extension: 3-  Quadriceps contraction: good    Additional Strength Details  L hip flex: 3-/5           Abd: 3+/5           Ext:3+    L ankle strength 4/5           Tests     Additional Tests Details  (-) Rika's             Precautions: HTN, IBS, lumbar spondylosis, OP, FM, h/o R DL and R TKA    Eval/Re-Eval POC Expires Auth #/ Referral # Total Visits Start Date Expiration Date Extension Info Visits Limitation                                                                   1 2 3 4 5 6   5/13 pre-op 6/3 post-op       7 8 9 10 11 12           13 14 15 16 17 18           19 20 21 22 23 24           25 26 27 28 29 30                 Manuals 5/13 6/3           PROM L knee  BG                                                  Neuro Re-Ed                                                                                                        Ther Ex 5/13 6/3           bike  x10'           HR  nv           Mini squats  nv           LAQ/SAQ  5\"x20ea           Heel slides HEP 10\"x10           Quad sets HEP 5\"x20           SLR flex             Lunge on step             Ther Activity             Sit-stand                          Gait Training                                       Modalities 5/13 6/3           CP prn                              "

## 2024-06-03 NOTE — TELEPHONE ENCOUNTER
"Patient contacted for a postoperative follow up assessment. Patient reports \"mishap since Friday\" and state she fell \"out of the bed.\" She reports posterior knee pain. She is able to ambulate with the RW, and reports pain improves with ambulating.     Patient states current pain level of a  5/10.   Patient denies increase in swelling, stating \"its gone down\" and dressing is clean, dry and intact (she continues to re-wrap the ACE bandage).  Patient is icing the site regularly, and we discussed continuing to ICE over the next few few weeks, areas of pain and swelling, especially after increased activity.    She reports she had a low grade fever, and we discussed postoperative low grade temperatures, she reports with tylenol having improvement and denies concerning symptoms.       She reports she is doing exercises taught to her, and has PT today.     We reviewed patients AVS medication list. Patient is taking Tylenol 650mg every 6 hours, Oxycodone 5mg PRN, ASA 81mg BID, and  OTC Miralax, and \"no problem\" going to the bathroom.     Patient denies nausea, vomiting, abdominal pain, chest pain, shortness of breath, dizziness and calf pain. Patient does not have any other questions or concerns at this time. Pt was encouraged to call with any questions, concerns or issues.    "

## 2024-06-07 ENCOUNTER — OFFICE VISIT (OUTPATIENT)
Dept: PHYSICAL THERAPY | Facility: MEDICAL CENTER | Age: 74
End: 2024-06-07
Payer: MEDICARE

## 2024-06-07 DIAGNOSIS — G89.29 CHRONIC PAIN OF LEFT KNEE: ICD-10-CM

## 2024-06-07 DIAGNOSIS — M25.562 CHRONIC PAIN OF LEFT KNEE: ICD-10-CM

## 2024-06-07 DIAGNOSIS — Z01.818 PRE-OP TESTING: ICD-10-CM

## 2024-06-07 DIAGNOSIS — Z96.652 HISTORY OF TOTAL KNEE REPLACEMENT, LEFT: Primary | ICD-10-CM

## 2024-06-07 PROCEDURE — 97110 THERAPEUTIC EXERCISES: CPT

## 2024-06-07 PROCEDURE — 97140 MANUAL THERAPY 1/> REGIONS: CPT

## 2024-06-07 NOTE — PROGRESS NOTES
"Daily Note     Today's date: 2024  Patient name: Heather Holt  : 1950  MRN: [id number]  Referring provider: Sonia Real MD  Dx:   Encounter Diagnosis     ICD-10-CM    1. History of total knee replacement, left  Z96.652       2. Chronic pain of left knee  M25.562     G89.29       3. Pre-op testing  Z01.818           Start Time: 1030  Stop Time: 1110  Total time in clinic (min): 40 minutes    Subjective: Pt voices concerns about swelling, is given EDU about elevation, icing, compression socks etc       Objective: See treatment diary below      Assessment: Heather tolerated treatment well with consistent cuing throughout. TE's were performed with the same resistance and reps. No new TE's were performed today. Following treatment, the patient demonstrated fatigue post treatment, exhibited good technique with therapeutic exercises, and would benefit from continued PT.         Plan: Continue per plan of care.      Precautions: HTN, IBS, lumbar spondylosis, OP, FM, h/o R DL and R TKA    Eval/Re-Eval POC Expires Auth #/ Referral # Total Visits Start Date Expiration Date Extension Info Visits Limitation                                                                   1 2 3 4 5 6    pre-op 6/3 post-op       7 8 9 10 11 12           13 14 15 16 17 18           19 20 21 22 23 24           25 26 27 28 29 30                 Manuals 5/13 6/3 6/7          PROM L knee  BG FH                                                 Neuro Re-Ed                                                                                                        Ther Ex 5/13 6/3 6/7          bike  x10' x10'          HR  nv 2x10          Mini squats  nv nv          LAQ/SAQ  5\"x20ea 5''x20 ea          Heel slides HEP 10\"x10 10''x10          Quad sets HEP 5\"x20 5''x20          SLR flex             Lunge on step             Ther Activity             Sit-stand                          Gait Training                                     "   Modalities 5/13 6/3 6/7          CP prn

## 2024-06-10 ENCOUNTER — OFFICE VISIT (OUTPATIENT)
Dept: PHYSICAL THERAPY | Facility: MEDICAL CENTER | Age: 74
End: 2024-06-10
Payer: MEDICARE

## 2024-06-10 DIAGNOSIS — M25.562 CHRONIC PAIN OF LEFT KNEE: ICD-10-CM

## 2024-06-10 DIAGNOSIS — Z96.652 HISTORY OF TOTAL KNEE REPLACEMENT, LEFT: Primary | ICD-10-CM

## 2024-06-10 DIAGNOSIS — G89.29 CHRONIC PAIN OF LEFT KNEE: ICD-10-CM

## 2024-06-10 PROCEDURE — 97110 THERAPEUTIC EXERCISES: CPT | Performed by: PHYSICAL THERAPIST

## 2024-06-10 PROCEDURE — 97140 MANUAL THERAPY 1/> REGIONS: CPT | Performed by: PHYSICAL THERAPIST

## 2024-06-10 NOTE — PROGRESS NOTES
"Daily Note     Today's date: 6/10/2024  Patient name: Heather Holt  : 1950  MRN: [id number]  Referring provider: Sonia Real MD  Dx:   Encounter Diagnosis     ICD-10-CM    1. History of total knee replacement, left  Z96.652       2. Chronic pain of left knee  M25.562     G89.29                      Subjective: Pt states she still feels her ankle and leg are very swollen.  Mod trendelenburg gait on L even with RW observed.        Objective: See treatment diary below      Assessment: Heather tolerated treatment well with consistent cuing throughout. TE's were performed with the same resistance and reps. No new TE's were performed today. Following treatment, the patient demonstrated fatigue post treatment, exhibited good technique with therapeutic exercises, and would benefit from continued PT.   Pt required assist with SLR flexion, however good quad activation with LAQ and quad sets present.  Pt advised to cont to stretch and ice.        Plan: Continue per plan of care.      Precautions: HTN, IBS, lumbar spondylosis, OP, FM, h/o R DL and R TKA    Eval/Re-Eval POC Expires Auth #/ Referral # Total Visits Start Date Expiration Date Extension Info Visits Limitation                                                                   1 2 3 4 5 6    pre-op 6/3 post-op 6/7 6/10     7 8 9 10 11 12           13 14 15 16 17 18           19 20 21 22 23 24           25 26 27 28 29 30                 Manuals 5/13 6/3 6/7 6/10         PROM L knee  BG FH BG                                                Neuro Re-Ed                                                                 clamshells             bridges             Standing hip abd    2x10         Ther Ex 5/13 6/3 6/7 6/10         bike  x10' x10' x10'         HR  nv 2x10 x20         Mini squats  nv nv x10         LAQ/SAQ  5\"x20ea 5''x20 ea 5\"x20ea         Heel slides HEP 10\"x10 10''x10 10\"x20         Quad sets HEP 5\"x20 5''x20 5\"x20         SLR flex    2x10 " w/A         Mikaela on step             Ther Activity             Sit-stand                          Gait Training                                       Modalities 5/13 6/3 6/7 6/10         CP prn   x10'

## 2024-06-12 ENCOUNTER — OFFICE VISIT (OUTPATIENT)
Dept: OBGYN CLINIC | Facility: CLINIC | Age: 74
End: 2024-06-12

## 2024-06-12 ENCOUNTER — HOSPITAL ENCOUNTER (OUTPATIENT)
Dept: RADIOLOGY | Facility: HOSPITAL | Age: 74
Discharge: HOME | End: 2024-06-12
Attending: ORTHOPAEDIC SURGERY
Payer: MEDICARE

## 2024-06-12 VITALS — WEIGHT: 210 LBS | HEIGHT: 62 IN | BODY MASS INDEX: 38.64 KG/M2

## 2024-06-12 DIAGNOSIS — Z96.652 S/P TOTAL KNEE REPLACEMENT USING CEMENT, LEFT: Primary | ICD-10-CM

## 2024-06-12 DIAGNOSIS — Z96.652 S/P TOTAL KNEE REPLACEMENT USING CEMENT, LEFT: ICD-10-CM

## 2024-06-12 PROCEDURE — 99024 POSTOP FOLLOW-UP VISIT: CPT | Performed by: ORTHOPAEDIC SURGERY

## 2024-06-12 PROCEDURE — 73562 X-RAY EXAM OF KNEE 3: CPT

## 2024-06-12 RX ORDER — ACETAMINOPHEN 500 MG
500 TABLET ORAL EVERY 6 HOURS PRN
COMMUNITY

## 2024-06-12 NOTE — PROGRESS NOTES
73 y.o.female presents for 2 weeks postoperative visit status post left TKA. Patient denies any chest pain, shortness or breath or calf pain.  Patient is taking aspirin for DVT prophylaxis.  Pain is well controlled with medication.    Review of Systems  Review of systems negative unless otherwise specified in HPI    Past Medical History  Past Medical History:   Diagnosis Date    Anxiety     Fibromyalgia     H/O cardiovascular stress test     H/O echocardiogram     HBP (high blood pressure)     Hypothyroidism     IBS (irritable bowel syndrome)     PONV (postoperative nausea and vomiting)     Vascular disease     Varicose veins       Past Surgical History  Past Surgical History:   Procedure Laterality Date    CARDIAC CATHETERIZATION      EF 50%. No significant CAD.     SECTION      CHOLECYSTECTOMY      HERNIA REPAIR      [place] ARTHRP KNE CONDYLE&PLATU MEDIAL&LAT COMPARTMENTS Left 2024    Procedure: ARTHROPLASTY KNEE TOTAL;  Surgeon: Sonia Real MD;  Location:  MAIN OR;  Service: Orthopedics    REPLACEMENT TOTAL KNEE Right 2013    TOTAL HIP ARTHROPLASTY Right 2019    VEIN SURGERY Bilateral     2002 Dr [address]rejon       Current Medications  Current Outpatient Medications on File Prior to Visit   Medication Sig Dispense Refill    acetaminophen (TYLENOL) 500 mg tablet Take 500 mg by mouth every 6 (six) hours as needed for mild pain      ascorbic acid (VITAMIN C) 500 MG tablet Take 1 tablet (500 mg total) by mouth 2 (two) times a day 60 tablet 1    aspirin (ECOTRIN LOW STRENGTH) 81 mg EC tablet Take 1 tablet (81 mg total) by mouth 2 (two) times a day 70 tablet 0    calcium carbonate (OS-INGRID) 600 MG tablet Take 600 mg by mouth 3 (three) times a week      Cholecalciferol (Vitamin D) 50 MCG (2000 UT) tablet Take 2,000 Units by mouth 3 (three) times a week      esomeprazole (NexIUM) 20 mg capsule Take 20 mg by mouth every evening      famotidine (PEPCID) 20 mg tablet       ferrous sulfate 324 (65  Fe) mg Take 1 tablet (324 mg total) by mouth 2 (two) times a day before meals 60 tablet 1    folic acid (FOLVITE) 1 mg tablet Take 1 tablet (1 mg total) by mouth daily 30 tablet 1    Krill Oil Omega-3 500 MG CAPS Take 350 mg by mouth every other day      latanoprost (XALATAN) 0.005 % ophthalmic solution 1 drop daily at bedtime      Linzess 72 MCG CAPS 72 mcg daily in the early morning       losartan (COZAAR) 100 MG tablet Take 100 mg by mouth daily at bedtime      metoprolol succinate (TOPROL-XL) 50 mg 24 hr tablet 50 mg daily      Multiple Vitamins-Minerals (ALIVE WOMENS 50+ GUMMY PO) Take by mouth      oxyCODONE (Roxicodone) 5 immediate release tablet Take 1 tablet (5 mg total) by mouth every 4 (four) hours as needed for moderate pain Max Daily Amount: 30 mg 40 tablet 0    Synthroid 88 MCG tablet       amoxicillin (AMOXIL) 500 mg capsule Take 2,000 mg by mouth 60 minutes pre-procedure (Patient not taking: Reported on 3/28/2023)      dexlansoprazole (Dexilant) 60 MG capsule Take 60 mg by mouth daily      losartan-hydrochlorothiazide (HYZAAR) 100-25 MG per tablet Take 1 tablet by mouth as needed (Patient not taking: Reported on 10/21/2022)      metoprolol tartrate (LOPRESSOR) 50 mg tablet Take 50 mg by mouth every 12 (twelve) hours (Patient not taking: Reported on 8/15/2023)       No current facility-administered medications on file prior to visit.       Recent Labs (HCT,HGB,PT,INR,ESR,CRP,GLU,HgA1C)  0   Lab Value Date/Time    HCT 35.5 05/31/2024 0533    HCT 40.2 11/21/2015 0509    HGB 11.7 05/31/2024 0533    HGB 13.1 11/21/2015 0509    WBC 12.00 (H) 05/31/2024 0533    WBC 5.44 11/21/2015 0509    INR 1.04 05/03/2024 0841    INR 1.09 11/20/2015 1729    ESR 27 07/27/2023 0751    CRP <3.0 03/08/2023 1308    GLUCOSE 89 11/21/2015 0509    HGBA1C 5.7 (H) 05/03/2024 0841    HGBA1C 5.3 11/21/2015 0509           Body mass index is 38.41 kg/m².  Wt Readings from Last 3 Encounters:   06/12/24 95.3 kg (210 lb)   05/30/24 95.3  kg (210 lb)   04/23/24 95.3 kg (210 lb)       Physical exam   General: Awake, Alert, Oriented   Eyes: Pupils equal, round and reactive to light    Heart: regular rate and rhythm   Lungs: No audible wheezing   Abdomen: soft  left Lower extremity      Incision well approximated without erythema no tenderness to palpation    Full knee extension 120 of flexion   Active ankle dorsal and plantarflexion without pain, calf nontender palpation   sensation mildly decreased mary-incisionally otherwise intact   Distal pulses present      Imaging  X rays of the left knee reviewed.  X rays reveal excellently aligned left total knee arthroplasty without evidence of loosening or osseous abnormality.    Assessment:  Status post 2 weeks left TKA    Plan:  Weight bearing as tolerated left Lower extremity  Physical therapy  [facility] antibiotic prophylaxis recommended  Pain medication as needed  Follow-up in 2 months and repeat x-rays left knee

## 2024-06-14 ENCOUNTER — OFFICE VISIT (OUTPATIENT)
Dept: PHYSICAL THERAPY | Facility: MEDICAL CENTER | Age: 74
End: 2024-06-14
Payer: MEDICARE

## 2024-06-14 DIAGNOSIS — M25.562 CHRONIC PAIN OF LEFT KNEE: ICD-10-CM

## 2024-06-14 DIAGNOSIS — G89.29 CHRONIC PAIN OF LEFT KNEE: ICD-10-CM

## 2024-06-14 DIAGNOSIS — Z96.652 HISTORY OF TOTAL KNEE REPLACEMENT, LEFT: Primary | ICD-10-CM

## 2024-06-14 PROCEDURE — 97110 THERAPEUTIC EXERCISES: CPT | Performed by: PHYSICAL THERAPIST

## 2024-06-14 PROCEDURE — 97140 MANUAL THERAPY 1/> REGIONS: CPT | Performed by: PHYSICAL THERAPIST

## 2024-06-14 NOTE — PROGRESS NOTES
Daily Note     Today's date: 2024  Patient name: Heather Holt  : 1950  MRN: [id number]  Referring provider: Sonia Real MD  Dx:   Encounter Diagnosis     ICD-10-CM    1. History of total knee replacement, left  Z96.652       2. Chronic pain of left knee  M25.562     G89.29                      Subjective: Pt states she had a f/u with MD on Wed and states he was pleased with her progress.  Pt was given compression sock for swelling.  Pt states she feels she is overdoing it at home with house work and does have increased pain and swelling upon presentation today.  Staples were removed and incision site clean, dry and intact with sterri strips in place.        Objective: See treatment diary below      Assessment: Heather tolerated treatment well with consistent cuing throughout.  Patient demonstrated fatigue post treatment, exhibited good technique with therapeutic exercises, and would benefit from continued PT.  Pt cont to require A with SLR flex due to weakness.  Knee ext ROM WNLs at this time.  Pt advised to cont to ice at home for swelling.          Plan: Continue per plan of care.      Precautions: HTN, IBS, lumbar spondylosis, OP, FM, h/o R DL and R TKA    Eval/Re-Eval POC Expires Auth #/ Referral # Total Visits Start Date Expiration Date Extension Info Visits Limitation                                                                   1 2 3 4 5 6    pre-op 6/3 post-op 6/7 6/10 6/14    7 8 9 10 11 12           13 14 15 16 17 18           19 20 21 22 23 24           25 26 27 28 29 30                 Manuals 5/13 6/3 6/7 6/10 6/14        PROM L knee  BG FH BG BG                                               Neuro Re-Ed                                                                 clamshells             bridges             Standing hip abd    2x10 2x10        Ther Ex 5/13 6/3 6/7 6/10 6/14        bike  x10' x10' x10' x10'        HR  nv 2x10 x20 x20        Mini squats  nv nv x10 2x10       "  LAQ/SAQ  5\"x20ea 5''x20 ea 5\"x20ea 5\"x20ea        Heel slides HEP 10\"x10 10''x10 10\"x20 10\"x20        Quad sets HEP 5\"x20 5''x20 5\"x20 5\"x20        SLR flex    2x10 w/A 2x10 W/A        Lunge on step             Ther Activity             Sit-stand             Stair training      nv        Gait Training     Nv with SPC                                  Modalities 5/13 6/3 6/7 6/10 6/14        CP prn   x10' x10'                          "

## 2024-06-17 ENCOUNTER — OFFICE VISIT (OUTPATIENT)
Dept: PHYSICAL THERAPY | Facility: MEDICAL CENTER | Age: 74
End: 2024-06-17
Payer: MEDICARE

## 2024-06-17 DIAGNOSIS — Z96.652 HISTORY OF TOTAL KNEE REPLACEMENT, LEFT: Primary | ICD-10-CM

## 2024-06-17 DIAGNOSIS — M25.562 CHRONIC PAIN OF LEFT KNEE: ICD-10-CM

## 2024-06-17 DIAGNOSIS — G89.29 CHRONIC PAIN OF LEFT KNEE: ICD-10-CM

## 2024-06-17 PROCEDURE — 97116 GAIT TRAINING THERAPY: CPT | Performed by: PHYSICAL THERAPIST

## 2024-06-17 PROCEDURE — 97140 MANUAL THERAPY 1/> REGIONS: CPT | Performed by: PHYSICAL THERAPIST

## 2024-06-17 PROCEDURE — 97110 THERAPEUTIC EXERCISES: CPT | Performed by: PHYSICAL THERAPIST

## 2024-06-17 NOTE — PROGRESS NOTES
"Daily Note     Today's date: 2024  Patient name: Heather Holt  : 1950  MRN: [id number]  Referring provider: Sonia Real MD  Dx:   Encounter Diagnosis     ICD-10-CM    1. History of total knee replacement, left  Z96.652       2. Chronic pain of left knee  M25.562     G89.29                      Subjective: Pt presents to PT today amb with SPC.  Mod R lat trunk shift present with gait with SPC observed.      Objective: See treatment diary below      Assessment: Heather tolerated treatment well with consistent cuing throughout.  Patient demonstrated fatigue post treatment, exhibited good technique with therapeutic exercises, and would benefit from continued PT.  Pt able to perform SLR without A today.  Progress hip strengthening as able.        Plan: Continue per plan of care.   Add R shoulder to POC during RA next week.     Precautions: HTN, IBS, lumbar spondylosis, OP, FM, h/o R DL and R TKA    Eval/Re-Eval POC Expires Auth #/ Referral # Total Visits Start Date Expiration Date Extension Info Visits Limitation                                                                   1 2 3 4 5 6    pre-op 6/3 post-op 6/7 6/10 6/14 6/17   7 8 9 10 11 12           13 14 15 16 17 18           19 20 21 22 23 24           25 26 27 28 29 30                 Manuals 5/13 6/3 6/7 6/10 6/14 6/17       PROM L knee  BG FH BG BG BG                                              Neuro Re-Ed                                                                 clamshells             bridges             Standing hip abd    2x10 2x10 2x10       Ther Ex 5/13 6/3 6/7 6/10 6/14 6/17       bike  x10' x10' x10' x10' x10'       HR  nv 2x10 x20 x20 x20       Mini squats  nv nv x10 2x10 x20       LAQ/SAQ  5\"x20ea 5''x20 ea 5\"x20ea 5\"x20ea 5\"x20ea       Heel slides HEP 10\"x10 10''x10 10\"x20 10\"x20 10\"x20       Quad sets HEP 5\"x20 5''x20 5\"x20 5\"x20 5\"x20       SLR flex    2x10 w/A 2x10 W/A 2x10       Lunge on step             Ther " Activity             Sit-stand             Stair training      nv Reciprical x5 laps       Gait Training     Nv with SPC W/SPC BG                                 Modalities 5/13 6/3 6/7 6/10 6/14 6/17       CP prn   x10' x10' NP

## 2024-06-18 DIAGNOSIS — Z01.818 PRE-OP TESTING: ICD-10-CM

## 2024-06-18 RX ORDER — FOLIC ACID 1 MG/1
1000 TABLET ORAL DAILY
Qty: 90 TABLET | Refills: 1 | Status: SHIPPED | OUTPATIENT
Start: 2024-06-18

## 2024-06-19 ENCOUNTER — OFFICE VISIT (OUTPATIENT)
Dept: PHYSICAL THERAPY | Facility: MEDICAL CENTER | Age: 74
End: 2024-06-19
Payer: MEDICARE

## 2024-06-19 DIAGNOSIS — Z96.652 HISTORY OF TOTAL KNEE REPLACEMENT, LEFT: Primary | ICD-10-CM

## 2024-06-19 DIAGNOSIS — M25.562 CHRONIC PAIN OF LEFT KNEE: ICD-10-CM

## 2024-06-19 DIAGNOSIS — G89.29 CHRONIC PAIN OF LEFT KNEE: ICD-10-CM

## 2024-06-19 PROCEDURE — 97140 MANUAL THERAPY 1/> REGIONS: CPT | Performed by: PHYSICAL THERAPIST

## 2024-06-19 PROCEDURE — 97110 THERAPEUTIC EXERCISES: CPT | Performed by: PHYSICAL THERAPIST

## 2024-06-19 NOTE — PROGRESS NOTES
"Daily Note     Today's date: 2024  Patient name: Heather Holt  : 1950  MRN: [id number]  Referring provider: Sonia Real MD  Dx:   Encounter Diagnosis     ICD-10-CM    1. History of total knee replacement, left  Z96.652       2. Chronic pain of left knee  M25.562     G89.29                      Subjective: Pt states mild knee soreness upon presentation, and c/c of stiffness.  Pt states she cont to ice 3x/day.      Objective: See treatment diary below      Assessment: Heather tolerated treatment well with consistent cuing throughout.  Patient demonstrated fatigue post treatment, exhibited good technique with therapeutic exercises, and would benefit from continued PT.  Initiated step ups today, which pt states feeling challenged by.  Progress hip abd strength as able due to mod lat trunk shift during gait.        Plan: Continue per plan of care.   Add R shoulder to POC during RA next visit.     Precautions: HTN, IBS, lumbar spondylosis, OP, FM, h/o R DL and R TKA    Eval/Re-Eval POC Expires Auth #/ Referral # Total Visits Start Date Expiration Date Extension Info Visits Limitation                                                                   1 2 3 4 5 6    pre-op 6/3 post-op 6/7 6/10 6/14 6/17   7 8 9 10 11 12           13 14 15 16 17 18           19 20 21 22 23 24           25 26 27 28 29 30                 Manuals 5/13 6/3 6/7 6/10 6/14 6/17 6/19      PROM L knee  BG FH BG BG BG BG      RA       nv                                Neuro Re-Ed                                                    S/l hip abd       nv      clamshells       nv      bridges       nv      Standing hip abd    2x10 2x10 2x10 2x10      Ther Ex 5/13 6/3 6/7 6/10 6/14 6/17 6/19      bike  x10' x10' x10' x10' x10' x10'      HR  nv 2x10 x20 x20 x20 x20      Mini squats  nv nv x10 2x10 x20 x20      LAQ/SAQ  5\"x20ea 5''x20 ea 5\"x20ea 5\"x20ea 5\"x20ea 5\"x20ea      Heel slides HEP 10\"x10 10''x10 10\"x20 10\"x20 10\"x20 " "10\"x20      Quad sets HEP 5\"x20 5''x20 5\"x20 5\"x20 5\"x20 5\"x20      SLR flex    2x10 w/A 2x10 W/A 2x10 2x10      Lunge on step             Ther Activity             Sit-stand             Stair training      nv Reciprical x5 laps Step ups 8\"x20, lat 6\"x20      Gait Training     Nv with SPC W/SPC BG                                 Modalities 5/13 6/3 6/7 6/10 6/14 6/17       CP prn   x10' x10' NP                         "

## 2024-06-22 DIAGNOSIS — Z01.818 PRE-OP TESTING: ICD-10-CM

## 2024-06-24 ENCOUNTER — EVALUATION (OUTPATIENT)
Dept: PHYSICAL THERAPY | Facility: MEDICAL CENTER | Age: 74
End: 2024-06-24
Payer: MEDICARE

## 2024-06-24 ENCOUNTER — OFFICE VISIT (OUTPATIENT)
Dept: OBGYN CLINIC | Facility: CLINIC | Age: 74
End: 2024-06-24

## 2024-06-24 VITALS — BODY MASS INDEX: 39.2 KG/M2 | HEART RATE: 80 BPM | OXYGEN SATURATION: 97 % | WEIGHT: 213 LBS | HEIGHT: 62 IN

## 2024-06-24 DIAGNOSIS — Z96.652 HISTORY OF TOTAL KNEE REPLACEMENT, LEFT: Primary | ICD-10-CM

## 2024-06-24 DIAGNOSIS — M25.511 CHRONIC RIGHT SHOULDER PAIN: ICD-10-CM

## 2024-06-24 DIAGNOSIS — M79.89 CALF SWELLING: Primary | ICD-10-CM

## 2024-06-24 DIAGNOSIS — M25.562 CHRONIC PAIN OF LEFT KNEE: ICD-10-CM

## 2024-06-24 DIAGNOSIS — G89.29 CHRONIC PAIN OF LEFT KNEE: ICD-10-CM

## 2024-06-24 DIAGNOSIS — T81.72XA COMPLICATION OF VEIN FOLLOWING A PROCEDURE, NOT ELSEWHERE CLASSIFIED, INITIAL ENCOUNTER: ICD-10-CM

## 2024-06-24 DIAGNOSIS — G89.29 CHRONIC RIGHT SHOULDER PAIN: ICD-10-CM

## 2024-06-24 PROCEDURE — 97164 PT RE-EVAL EST PLAN CARE: CPT | Performed by: PHYSICAL THERAPIST

## 2024-06-24 PROCEDURE — 99024 POSTOP FOLLOW-UP VISIT: CPT | Performed by: PHYSICIAN ASSISTANT

## 2024-06-24 PROCEDURE — 97140 MANUAL THERAPY 1/> REGIONS: CPT | Performed by: PHYSICAL THERAPIST

## 2024-06-24 PROCEDURE — 97110 THERAPEUTIC EXERCISES: CPT | Performed by: PHYSICAL THERAPIST

## 2024-06-24 RX ORDER — FERROUS SULFATE 324(65)MG
324 TABLET, DELAYED RELEASE (ENTERIC COATED) ORAL
Qty: 60 TABLET | Refills: 1 | Status: SHIPPED | OUTPATIENT
Start: 2024-06-24

## 2024-06-24 NOTE — PROGRESS NOTES
PT Re-Evaluation     Today's date: 2024  Patient name: Heather Holt  : 1950  MRN: [id number]  Referring provider: Sonia Real MD  Dx:   Encounter Diagnosis     ICD-10-CM    1. History of total knee replacement, left  Z96.652       2. Chronic pain of left knee  M25.562     G89.29       3. Chronic right shoulder pain  M25.511     G89.29                      Assessment  Impairments: abnormal gait, abnormal muscle firing, abnormal or restricted ROM, activity intolerance, impaired physical strength, lacks appropriate home exercise program, pain with function, weight-bearing intolerance, poor posture  and poor body mechanics    Assessment details: Pt has attended 7 visits of skilled PT over the past 4 weeks.  Pt states she feels she is making steady progress with gait, decreased pain, however still feels her L LE swells as soon as she gets out of bed in the morning.   Pt states she barely uses SPC at home, but does walk all day.  Pt still has difficulty with stairs.  Pt also c/o R shoulder pain, and wanted to add that to her POC at this time.  Pt states having MRI >1 yr ago and did show a tear.  Pt states most shoulder pain with lifting overhead.  Upon reassessment, pt demonstrates decreased pain/ tissue tenderness, steady gain L knee strength and ROM, however gait deficits and functional limitations persist.  Pt does have (+) pain/tenderness post calf and continued pitting edema L lower leg and ankle.  Will contact MD regarding post calf tenderness and LE swelling.  Thank you for your referral.  Understanding of Dx/Px/POC: good     Prognosis: good    Goals  ST: Decrease pain by 25% in 4 weeks with all functional activities-partially met as of   2: Improved ROM by 25% in 4 weeks to assist with all functional activities-met as of   3: Improve strength by 1/2 grade in 4 weeks to assist with all functional activities-partially met as of   LT:  Decrease pain to 0-1/10 with all  functional activities in 4-6 weeks  2: Improved ROM to WFL's in 4-6 weeks to assist with all functional activities  3: Increase strength to WFL's in 4-6 weeks to assist with all functional activities     Plan  Patient would benefit from: skilled physical therapy  Planned modality interventions: cryotherapy    Planned therapy interventions: IASTM, joint mobilization, manual therapy, neuromuscular re-education, muscle pump exercises, patient education, postural training, strengthening, stretching, therapeutic activities, therapeutic exercise, IADL retraining, home exercise program, functional ROM exercises, flexibility, gait training and balance/weight bearing training    Frequency: 2x week  Duration in weeks: 6  Plan of Care beginning date: 6/3/2024  Plan of Care expiration date: 2024  Treatment plan discussed with: patient        Subjective Evaluation    History of Present Illness  Mechanism of injury: surgery  Mechanism of injury: L TKA 24  Patient Goals  Patient goals for therapy: increased strength, independence with ADLs/IADLs, return to sport/leisure activities, increased motion, improved balance and decreased pain    Pain  Current pain ratin  At best pain ratin  At worst pain ratin  Location: L knee  Quality: dull ache  Relieving factors: medications and ice  Aggravating factors: walking, standing and stair climbing    Social Support  Steps to enter house: yes  2  Stairs in house: no   Lives in: one-story house  Lives with: spouse      Diagnostic Tests  X-ray: abnormal  Treatments  Current treatment: physical therapy        Objective     Observations     Additional Observation Details  Observation:  72 yo female, mild quad atrophy, L incision site clean, dry and intact staples removed, sterri strips falling off, mild scabbing present,  (+) swelling    Gait:  Pt amb ind with SPC, antalgic gait,  decreased opal, step-through gait patterning,  mod R lat trunk shift present    Palpation      Additional Palpation Details  Palpation:  mild tenderness med>lat knee joint line, med hamstring tendons    Neurological Testing     Sensation     Knee   Left Knee   Intact: Light touch    Active Range of Motion   Left Shoulder   External rotation BTH: C5   Internal rotation BTB: T12     Right Shoulder   Flexion: 160 degrees   Extension: 60 degrees with pain  Abduction: 170 degrees   External rotation BTH: C5   Internal rotation BTB: T12   Horizontal abduction: with pain  Left Knee   Flexion: 95 degrees   Extension: 0 degrees     Additional Active Range of Motion Details  Palpation:  pain/tenderness R ant shoulder/RTC insertion site    Passive Range of Motion   Left Knee   Flexion: 105 degrees with pain  Extension: 0 degrees     Strength/Myotome Testing     Right Shoulder     Planes of Motion   Flexion: 3+   Extension: 4-   Abduction: 4   Adduction: 4   External rotation at 0°: 3+   Internal rotation at 0°: 4     Left Knee   Flexion: 4-  Extension: 4-  Quadriceps contraction: good    Additional Strength Details  L hip flex: 3+/5           Abd: 4-/5           Ext:4-/5    L ankle strength 4/5           Tests     Right Shoulder   Positive empty can.     Additional Tests Details  (+) Rika's             Precautions: HTN, IBS, lumbar spondylosis, OP, FM, h/o R DL and R TKA    1 2 3 4 5 6   5/13 pre-op 6/3 post-op 6/7 6/10 6/14 6/17   7 8 9 10 11 12   6/19 6/24 RA            13 14 15 16 17 18                 19 20 21 22 23 24                 25 26 27 28 29 30                          Manuals 5/13 6/3 6/7 6/10 6/14 6/17 6/19  6/24       PROM L knee   BG FH BG BG BG BG  BG       RA             nv  BG        add R RTC to POC                BG                               Neuro Re-Ed                       4-way shoulder isometrics                nv        Tb ext/retract                nv        TB IR/ER                nv       S/l hip abd             nv         clamshells             nv         bridges             nv     "     Standing hip abd       2x10 2x10 2x10 2x10  2# 2x10ea       Ther Ex 5/13 6/3 6/7 6/10 6/14 6/17 6/19  6/24       bike   x10' x10' x10' x10' x10' x10'  x10'       HR   nv 2x10 x20 x20 x20 x20  x20       Mini squats   nv nv x10 2x10 x20 x20  x20       LAQ/SAQ   5\"x20ea 5''x20 ea 5\"x20ea 5\"x20ea 5\"x20ea 5\"x20ea  2# 5\"x20ea       Heel slides HEP 10\"x10 10''x10 10\"x20 10\"x20 10\"x20 10\"x20  10\"x10       Quad sets HEP 5\"x20 5''x20 5\"x20 5\"x20 5\"x20 5\"x20  5\"x20       SLR flex       2x10 w/A 2x10 W/A 2x10 2x10  2x10       Lunge on step                       Ther Activity                       Sit-stand                       Stair training          nv Reciprical x5 laps Step ups 8\"x20, lat 6\"x20  step ups 6\"x20. Lat x20       Gait Training         Nv with SPC W/SPC BG                                                           Modalities 5/13 6/3 6/7 6/10 6/14 6/17           CP prn     x10' x10' NP                                       "

## 2024-06-24 NOTE — PROGRESS NOTES
Assessment:   S/P Arthroplasty Knee Total - Left on 5/30/2024  Left calf swelling - concern for DVT    Plan:   Stat duplex doppler r/o DVT    Follow Up:  As scheduled with Dr. Real    To Do Next Visit:         CHIEF COMPLAINT:  No chief complaint on file.        SUBJECTIVE:  Heather Holt is a 73 y.o. female who presents for follow up after Arthroplasty Knee Total - Left on 5/30/2024.  Today patient has popliteal pain and calf/ankle swelling.  The swelling has been there for 2 weeks, and the popliteal pain is new today. No fever or chills.  No chest tightness, chest pain, or sob.      PHYSICAL EXAMINATION:  Vital signs: There were no vitals taken for this visit.  General: well developed and well nourished, alert, oriented times 3, and appears comfortable  Psychiatric: Normal    MUSCULOSKELETAL EXAMINATION:  Incision: Clean, dry, intact  Range of Motion: As expected  Neurovascular status: Neuro intact, good cap refill        STUDIES REVIEWED:  No Studies to review      PROCEDURES PERFORMED:  Procedures  No Procedures performed today

## 2024-06-25 ENCOUNTER — HOSPITAL ENCOUNTER (OUTPATIENT)
Dept: NON INVASIVE DIAGNOSTICS | Facility: CLINIC | Age: 74
Discharge: HOME | End: 2024-06-25
Payer: MEDICARE

## 2024-06-25 DIAGNOSIS — M79.89 CALF SWELLING: ICD-10-CM

## 2024-06-25 DIAGNOSIS — T81.72XA COMPLICATION OF VEIN FOLLOWING A PROCEDURE, NOT ELSEWHERE CLASSIFIED, INITIAL ENCOUNTER: ICD-10-CM

## 2024-06-25 PROCEDURE — 93971 EXTREMITY STUDY: CPT | Performed by: SURGERY

## 2024-06-25 PROCEDURE — 93971 EXTREMITY STUDY: CPT

## 2024-06-26 ENCOUNTER — OFFICE VISIT (OUTPATIENT)
Dept: PHYSICAL THERAPY | Facility: MEDICAL CENTER | Age: 74
End: 2024-06-26
Payer: MEDICARE

## 2024-06-26 DIAGNOSIS — M25.511 CHRONIC RIGHT SHOULDER PAIN: ICD-10-CM

## 2024-06-26 DIAGNOSIS — G89.29 CHRONIC PAIN OF LEFT KNEE: ICD-10-CM

## 2024-06-26 DIAGNOSIS — M25.562 CHRONIC PAIN OF LEFT KNEE: ICD-10-CM

## 2024-06-26 DIAGNOSIS — G89.29 CHRONIC RIGHT SHOULDER PAIN: ICD-10-CM

## 2024-06-26 DIAGNOSIS — Z96.652 HISTORY OF TOTAL KNEE REPLACEMENT, LEFT: Primary | ICD-10-CM

## 2024-06-26 PROCEDURE — 97140 MANUAL THERAPY 1/> REGIONS: CPT | Performed by: PHYSICAL THERAPIST

## 2024-06-26 PROCEDURE — 97110 THERAPEUTIC EXERCISES: CPT | Performed by: PHYSICAL THERAPIST

## 2024-06-26 NOTE — PROGRESS NOTES
"Daily Note     Today's date: 2024  Patient name: Heather Holt  : 1950  MRN: [id number]  Referring provider: Sonia Real MD  Dx:   Encounter Diagnosis     ICD-10-CM    1. History of total knee replacement, left  Z96.652       2. Chronic pain of left knee  M25.562     G89.29       3. Chronic right shoulder pain  M25.511     G89.29                      Subjective:  Pt went to see PA after treatment on Monday, and Doppler was neg for DVT.  Pt states she had a bad night's sleep and woke up with a \"kink\" in her L neck.  Pt also c/o restless leg in her R leg last night, which is why she had a rough night sleeping.    Objective: See treatment diary below      Assessment: Tolerated treatment well. Patient demonstrated fatigue post treatment and would benefit from continued PT.  Initiated gentle RTC strengthening on the R today.        Plan: Continue per plan of care.      Precautions: HTN, IBS, lumbar spondylosis, OP, FM, h/o R DL and R TKA    1 2 3 4 5 6    pre-op 6/3 post-op 6/7 6/10 6/14 6/17   7 8 9 10 11 12    RA            13 14 15 16 17 18                 19 20 21 22 23 24                 25 26 27 28 29 30                          Manuals 5/13 6/3 6/7 6/10 6/14 6/17 6/19  6/24  6/26     PROM L knee   BG FH BG BG BG BG  BG  BG     RA             nv  BG        add R RTC to POC                BG                               Neuro Re-Ed                       4-way shoulder isometrics                nv  5\"x10ea      Tb ext/retract                nv  scap retract 5\"x20      TB IR/ER                nv       S/l hip abd             nv         clamshells             nv         bridges             nv         Standing hip abd       2x10 2x10 2x10 2x10  2# 2x10ea  2# 2x10ea     Ther Ex 5/13 6/3 6/7 6/10 6/14 6/17 6/19  6/24  6/26     bike   x10' x10' x10' x10' x10' x10'  x10'  x10'     HR   nv 2x10 x20 x20 x20 x20  x20  x20     Mini squats   nv nv x10 2x10 x20 x20  x20  x20     LAQ/SAQ  " " 5\"x20ea 5''x20 ea 5\"x20ea 5\"x20ea 5\"x20ea 5\"x20ea  2# 5\"x20ea  2# 5\"x20ea     Heel slides HEP 10\"x10 10''x10 10\"x20 10\"x20 10\"x20 10\"x20  10\"x10  10\"x20     Quad sets HEP 5\"x20 5''x20 5\"x20 5\"x20 5\"x20 5\"x20  5\"x20  5\"x20     SLR flex       2x10 w/A 2x10 W/A 2x10 2x10  2x10  2x10     Lunge on step                       Ther Activity                       Sit-stand                       Stair training          nv Reciprical x5 laps Step ups 8\"x20, lat 6\"x20  step ups 6\"x20. Lat x20  step ups 6\"x20ea , fwd and lat     Gait Training         Nv with SPC W/SPC BG                                                           Modalities 5/13 6/3 6/7 6/10 6/14 6/17           CP prn     x10' x10' NP                                            "

## 2024-07-01 ENCOUNTER — OFFICE VISIT (OUTPATIENT)
Dept: PHYSICAL THERAPY | Facility: MEDICAL CENTER | Age: 74
End: 2024-07-01
Payer: MEDICARE

## 2024-07-01 DIAGNOSIS — Z96.652 HISTORY OF TOTAL KNEE REPLACEMENT, LEFT: Primary | ICD-10-CM

## 2024-07-01 DIAGNOSIS — M25.562 CHRONIC PAIN OF LEFT KNEE: ICD-10-CM

## 2024-07-01 DIAGNOSIS — M25.511 CHRONIC RIGHT SHOULDER PAIN: ICD-10-CM

## 2024-07-01 DIAGNOSIS — G89.29 CHRONIC PAIN OF LEFT KNEE: ICD-10-CM

## 2024-07-01 DIAGNOSIS — G89.29 CHRONIC RIGHT SHOULDER PAIN: ICD-10-CM

## 2024-07-01 PROCEDURE — 97140 MANUAL THERAPY 1/> REGIONS: CPT

## 2024-07-01 PROCEDURE — 97110 THERAPEUTIC EXERCISES: CPT

## 2024-07-01 NOTE — PROGRESS NOTES
"Daily Note     Today's date: 2024  Patient name: Heather Holt  : 1950  MRN: [id number]  Referring provider: Sonia Real MD  Dx:   Encounter Diagnosis     ICD-10-CM    1. History of total knee replacement, left  Z96.652       2. Chronic pain of left knee  M25.562     G89.29       3. Chronic right shoulder pain  M25.511     G89.29           Start Time: 1036  Stop Time: 1121  Total time in clinic (min): 45 minutes    Subjective:  Pt c/o swelling in L ankle.     Objective: See treatment diary below      Assessment: Tolerated treatment well. Patient demonstrated fatigue post treatment and would benefit from continued PT.  Pt advised to speak to primary about interventions for swelling.       Plan: Continue per plan of care.      Precautions: HTN, IBS, lumbar spondylosis, OP, FM, h/o R DL and R TKA    1 2 3 4 5 6    pre-op 6/3 post-op 6/7 6/10 6/14 6/17   7 8 9 10 11 12    RA            13 14 15 16 17 18                 19 20 21 22 23 24                 25 26 27 28 29 30                          Manuals 5/13 6/3 6/7 6/10 6/14 6/17 6/19  6/24  6/26  7/1   PROM L knee   BG FH BG BG BG BG  BG  BG  SA   RA             nv  BG        add R RTC to POC                BG                               Neuro Re-Ed                       4-way shoulder isometrics                nv  5\"x10ea  5\"x10ea    Tb ext/retract                nv  scap retract 5\"x20  scap retract 5\"x20    TB IR/ER                nv       S/l hip abd             nv         clamshells             nv         bridges             nv         Standing hip abd       2x10 2x10 2x10 2x10  2# 2x10ea  2# 2x10ea  2# 2x10ea   Ther Ex 5/13 6/3 6/7 6/10 6/14 6/17 6/19  6/24  6/26  7/1   bike   x10' x10' x10' x10' x10' x10'  x10'  x10'  x10'   HR   nv 2x10 x20 x20 x20 x20  x20  x20  x20   Mini squats   nv nv x10 2x10 x20 x20  x20  x20  x20   LAQ/SAQ   5\"x20ea 5''x20 ea 5\"x20ea 5\"x20ea 5\"x20ea 5\"x20ea  2# 5\"x20ea  2# 5\"x20ea  2# 5\"x20ea " "  Heel slides HEP 10\"x10 10''x10 10\"x20 10\"x20 10\"x20 10\"x20  10\"x10  10\"x20  10\"x20   Quad sets HEP 5\"x20 5''x20 5\"x20 5\"x20 5\"x20 5\"x20  5\"x20  5\"x20  5\"x20   SLR flex       2x10 w/A 2x10 W/A 2x10 2x10  2x10  2x10  2x10   Lunge on step                       Ther Activity                       Sit-stand                       Stair training          nv Reciprical x5 laps Step ups 8\"x20, lat 6\"x20  step ups 6\"x20. Lat x20  step ups 6\"x20ea , fwd and lat  step ups 6\"x20ea , fwd and lat   Gait Training         Nv with SPC W/SPC BG                                                           Modalities 5/13 6/3 6/7 6/10 6/14 6/17           CP prn     x10' x10' NP                                            "

## 2024-07-03 ENCOUNTER — OFFICE VISIT (OUTPATIENT)
Dept: PHYSICAL THERAPY | Facility: MEDICAL CENTER | Age: 74
End: 2024-07-03
Payer: MEDICARE

## 2024-07-03 DIAGNOSIS — Z96.652 HISTORY OF TOTAL KNEE REPLACEMENT, LEFT: Primary | ICD-10-CM

## 2024-07-03 DIAGNOSIS — M25.562 CHRONIC PAIN OF LEFT KNEE: ICD-10-CM

## 2024-07-03 DIAGNOSIS — G89.29 CHRONIC RIGHT SHOULDER PAIN: ICD-10-CM

## 2024-07-03 DIAGNOSIS — M25.511 CHRONIC RIGHT SHOULDER PAIN: ICD-10-CM

## 2024-07-03 DIAGNOSIS — G89.29 CHRONIC PAIN OF LEFT KNEE: ICD-10-CM

## 2024-07-03 PROCEDURE — 97140 MANUAL THERAPY 1/> REGIONS: CPT | Performed by: PHYSICAL THERAPIST

## 2024-07-03 PROCEDURE — 97110 THERAPEUTIC EXERCISES: CPT | Performed by: PHYSICAL THERAPIST

## 2024-07-03 NOTE — PROGRESS NOTES
"Daily Note     Today's date: 7/3/2024  Patient name: Heather Holt  : 1950  MRN: [id number]  Referring provider: Sonia Real MD  Dx:   No diagnosis found.                 Subjective:  Pt reports continued swelling in the knee and ankle worse as the day progresses. She denies any difficulty with ADLs besides    Objective: See treatment diary below  Knee PROM: 0-109 deg    Assessment: Tolerated treatment well. Fatigue but good form noted with squats and stair navigation. Knee PROM continues to improve since last recorded measurement.       Plan: Continue per plan of care.      Precautions: HTN, IBS, lumbar spondylosis, OP, FM, h/o R DL and R TKA    1 2 3 4 5 6    pre-op 6/3 post-op 6/7 6/10 6/14 6/17   7 8 9 10 11 12    RA  6/26  7/3        13 14 15 16 17 18                 19 20 21 22 23 24                 25 26 27 28 29 30                          Manuals 6/3 6/7 6/10 6/14 6/17 6/19  6/24  6/26  7/1 7/3   PROM L knee BG FH BG BG BG BG  BG  BG  SA NB   RA           nv  BG         add R RTC to POC              BG                               Neuro Re-Ed                      4-way shoulder isometrics              nv  5\"x10ea  5\"x10ea     Tb ext/retract              nv  scap retract 5\"x20  scap retract 5\"x20     TB IR/ER              nv        S/l hip abd           nv          clamshells           nv          bridges           nv          Standing hip abd     2x10 2x10 2x10 2x10  2# 2x10ea  2# 2x10ea  2# 2x10ea 2# 2x10 ea   Ther Ex 6/3 6/7 6/10 6/14 6/17 6/19  6/24  6/26  7/1 7/3   bike x10' x10' x10' x10' x10' x10'  x10'  x10'  x10' x10'   HR nv 2x10 x20 x20 x20 x20  x20  x20  x20 x20   Mini squats nv nv x10 2x10 x20 x20  x20  x20  x20 x20   LAQ/SAQ 5\"x20ea 5''x20 ea 5\"x20ea 5\"x20ea 5\"x20ea 5\"x20ea  2# 5\"x20ea  2# 5\"x20ea  2# 5\"x20ea 2# 5\"x20 ea   Heel slides 10\"x10 10''x10 10\"x20 10\"x20 10\"x20 10\"x20  10\"x10  10\"x20  10\"x20 10\"x20   Quad sets 5\"x20 5''x20 5\"x20 5\"x20 5\"x20 5\"x20  " "5\"x20  5\"x20  5\"x20 5\"x20   SLR flex     2x10 w/A 2x10 W/A 2x10 2x10  2x10  2x10  2x10 2x10   Lunge on step                      Ther Activity                      Sit-stand                      Stair training        nv Reciprical x5 laps Step ups 8\"x20, lat 6\"x20  step ups 6\"x20. Lat x20  step ups 6\"x20ea , fwd and lat  step ups 6\"x20ea , fwd and lat Step ups 6\"x20 ea fwd and lat   Gait Training       Nv with SPC W/SPC BG                                                          Modalities 6/3 6/7 6/10 6/14 6/17            CP     x10' x10' NP                                            "

## 2024-07-08 ENCOUNTER — OFFICE VISIT (OUTPATIENT)
Dept: PHYSICAL THERAPY | Facility: MEDICAL CENTER | Age: 74
End: 2024-07-08
Payer: MEDICARE

## 2024-07-08 DIAGNOSIS — M25.562 CHRONIC PAIN OF LEFT KNEE: ICD-10-CM

## 2024-07-08 DIAGNOSIS — G89.29 CHRONIC PAIN OF LEFT KNEE: ICD-10-CM

## 2024-07-08 DIAGNOSIS — G89.29 CHRONIC RIGHT SHOULDER PAIN: ICD-10-CM

## 2024-07-08 DIAGNOSIS — Z96.652 HISTORY OF TOTAL KNEE REPLACEMENT, LEFT: Primary | ICD-10-CM

## 2024-07-08 DIAGNOSIS — M25.511 CHRONIC RIGHT SHOULDER PAIN: ICD-10-CM

## 2024-07-08 PROCEDURE — 97140 MANUAL THERAPY 1/> REGIONS: CPT | Performed by: PHYSICAL THERAPIST

## 2024-07-08 PROCEDURE — 97110 THERAPEUTIC EXERCISES: CPT | Performed by: PHYSICAL THERAPIST

## 2024-07-08 NOTE — PROGRESS NOTES
"Daily Note     Today's date: 2024  Patient name: Heather Holt  : 1950  MRN: [id number]  Referring provider: Sonia Real MD  Dx:   Encounter Diagnosis     ICD-10-CM    1. History of total knee replacement, left  Z96.652       2. Chronic pain of left knee  M25.562     G89.29       3. Chronic right shoulder pain  M25.511     G89.29                        Subjective:  Pt reports having pain along base of 1st MTP, but states she was wearing new orthotics while she was having pain.  Pt states she went back to her old sneakers and pain seemed to improve.    Objective: See treatment diary below      Assessment: Tolerated treatment well (+) Lat trunk shift b/l persists throughout gait.  Knee ROM WFL's and decreased swelling L LE observed.        Plan: Continue per plan of care.      Precautions: HTN, IBS, lumbar spondylosis, OP, FM, h/o R DL and R TKA    1 2 3 4 5 6    pre-op 6/3 post-op 6/7 6/10 6/14 6/17   7 8 9 10 11 12    RA  6/26  7/3  7/8      13 14 15 16 17 18                 19 20 21 22 23 24                 25 26 27 28 29 30                          Manuals 7/8 6/7 6/10 6/14 6/17 6/19  6/24  6/26  7/1 7/3   PROM L knee BG FH BG BG BG BG  BG  BG  SA NB   RA           nv  BG         add R RTC to POC              BG                               Neuro Re-Ed                      4-way shoulder isometrics              nv  5\"x10ea  5\"x10ea     Tb ext/retract              nv  scap retract 5\"x20  scap retract 5\"x20     TB IR/ER              nv        S/l hip abd           nv          clamshells           nv          bridges           nv          Standing hip abd  2x10ea   2x10 2x10 2x10 2x10  2# 2x10ea  2# 2x10ea  2# 2x10ea 2# 2x10 ea   Ther Ex 7/8 6/7 6/10 6/14 6/17 6/19  6/24  6/26  7/1 7/3   bike x10' x10' x10' x10' x10' x10'  x10'  x10'  x10' x10'   HR x20 2x10 x20 x20 x20 x20  x20  x20  x20 x20   Mini squats x20 nv x10 2x10 x20 x20  x20  x20  x20 x20   LAQ/SAQ 2.5# 5\"x20ea 5''x20 " "ea 5\"x20ea 5\"x20ea 5\"x20ea 5\"x20ea  2# 5\"x20ea  2# 5\"x20ea  2# 5\"x20ea 2# 5\"x20 ea   Heel slides 10\"x20 10''x10 10\"x20 10\"x20 10\"x20 10\"x20  10\"x10  10\"x20  10\"x20 10\"x20   Quad sets 5\"x20 5''x20 5\"x20 5\"x20 5\"x20 5\"x20  5\"x20  5\"x20  5\"x20 5\"x20   SLR flex  2x10   2x10 w/A 2x10 W/A 2x10 2x10  2x10  2x10  2x10 2x10   Lunge on step                      Ther Activity                      Sit-stand                      Stair training   step ups fwd/lat 6\"x20ea     nv Reciprical x5 laps Step ups 8\"x20, lat 6\"x20  step ups 6\"x20. Lat x20  step ups 6\"x20ea , fwd and lat  step ups 6\"x20ea , fwd and lat Step ups 6\"x20 ea fwd and lat   Gait Training       Nv with SPC W/SPC BG                                                          Modalities  6/7 6/10 6/14 6/17            CP     x10' x10' NP                                            "

## 2024-07-10 ENCOUNTER — OFFICE VISIT (OUTPATIENT)
Dept: PHYSICAL THERAPY | Facility: MEDICAL CENTER | Age: 74
End: 2024-07-10
Payer: MEDICARE

## 2024-07-10 DIAGNOSIS — M25.562 CHRONIC PAIN OF LEFT KNEE: ICD-10-CM

## 2024-07-10 DIAGNOSIS — G89.29 CHRONIC PAIN OF LEFT KNEE: ICD-10-CM

## 2024-07-10 DIAGNOSIS — M25.511 CHRONIC RIGHT SHOULDER PAIN: ICD-10-CM

## 2024-07-10 DIAGNOSIS — G89.29 CHRONIC RIGHT SHOULDER PAIN: ICD-10-CM

## 2024-07-10 DIAGNOSIS — Z96.652 HISTORY OF TOTAL KNEE REPLACEMENT, LEFT: Primary | ICD-10-CM

## 2024-07-10 PROCEDURE — 97140 MANUAL THERAPY 1/> REGIONS: CPT | Performed by: PHYSICAL THERAPIST

## 2024-07-10 PROCEDURE — 97110 THERAPEUTIC EXERCISES: CPT | Performed by: PHYSICAL THERAPIST

## 2024-07-10 NOTE — PROGRESS NOTES
"Daily Note     Today's date: 7/10/2024  Patient name: Heather Holt  : 1950  MRN: [id number]  Referring provider: Sonia Rael MD  Dx:   Encounter Diagnosis     ICD-10-CM    1. History of total knee replacement, left  Z96.652       2. Chronic pain of left knee  M25.562     G89.29       3. Chronic right shoulder pain  M25.511     G89.29                        Subjective:  Pt reports c/c of stiffness upon presentation today, but states she was busy doing laundry, showering today and was on her feet all morning.    Objective: See treatment diary below      Assessment: Tolerated treatment well (+) Lat trunk shift b/l persists throughout gait. Pt demonstrates most soreness with end range flexion PROM.      Plan: Continue per plan of care.      Precautions: HTN, IBS, lumbar spondylosis, OP, FM, h/o R DL and R TKA    1 2 3 4 5 6    pre-op 6/3 post-op 6/7 6/10 6/14 6/17   7 8 9 10 11 12    RA  6/26  7/3  7/8  7/10    13 14 15 16 17 18                 19 20 21 22 23 24                 25 26 27 28 29 30                          Manuals 7/8 7/10 6/10 6/14 6/17 6/19  6/24  6/26  7/1 7/3   PROM L knee BG BG BG BG BG BG  BG  BG  SA NB   RA           nv  BG         add R RTC to POC              BG                               Neuro Re-Ed                      4-way shoulder isometrics              nv  5\"x10ea  5\"x10ea     Tb ext/retract              nv  scap retract 5\"x20  scap retract 5\"x20     TB IR/ER              nv        S/l hip abd           nv          clamshells           nv          bridges           nv          Standing hip abd  2x10ea  2.5# x20ea 2x10 2x10 2x10 2x10  2# 2x10ea  2# 2x10ea  2# 2x10ea 2# 2x10 ea   Ther Ex 7/8 7/10 6/10 6/14 6/17 6/19  6/24  6/26  7/1 7/3   bike x10' x10' x10' x10' x10' x10'  x10'  x10'  x10' x10'   HR x20 2x10 x20 x20 x20 x20  x20  x20  x20 x20   Mini squats x20 x20 x10 2x10 x20 x20  x20  x20  x20 x20   LAQ/SAQ 2.5# 5\"x20ea 2.5# 5''x20 ea 5\"x20ea 5\"x20ea " "5\"x20ea 5\"x20ea  2# 5\"x20ea  2# 5\"x20ea  2# 5\"x20ea 2# 5\"x20 ea   Heel slides 10\"x20 10''x10 10\"x20 10\"x20 10\"x20 10\"x20  10\"x10  10\"x20  10\"x20 10\"x20   Quad sets 5\"x20 5''x20 5\"x20 5\"x20 5\"x20 5\"x20  5\"x20  5\"x20  5\"x20 5\"x20   SLR flex  2x10  2x10 2x10 w/A 2x10 W/A 2x10 2x10  2x10  2x10  2x10 2x10   Lunge on step                      Ther Activity                      Sit-stand                      Stair training   step ups fwd/lat 6\"x20ea  step ups 6\"x20 fwd/lat x20ea   nv Reciprical x5 laps Step ups 8\"x20, lat 6\"x20  step ups 6\"x20. Lat x20  step ups 6\"x20ea , fwd and lat  step ups 6\"x20ea , fwd and lat Step ups 6\"x20 ea fwd and lat   Gait Training       Nv with SPC W/SPC BG                                                          Modalities   6/10 6/14 6/17            CP     x10' x10' NP                                            "

## 2024-07-15 ENCOUNTER — OFFICE VISIT (OUTPATIENT)
Dept: PHYSICAL THERAPY | Facility: MEDICAL CENTER | Age: 74
End: 2024-07-15
Payer: MEDICARE

## 2024-07-15 DIAGNOSIS — Z96.652 HISTORY OF TOTAL KNEE REPLACEMENT, LEFT: Primary | ICD-10-CM

## 2024-07-15 DIAGNOSIS — M25.511 CHRONIC RIGHT SHOULDER PAIN: ICD-10-CM

## 2024-07-15 DIAGNOSIS — G89.29 CHRONIC PAIN OF LEFT KNEE: ICD-10-CM

## 2024-07-15 DIAGNOSIS — M25.562 CHRONIC PAIN OF LEFT KNEE: ICD-10-CM

## 2024-07-15 DIAGNOSIS — G89.29 CHRONIC RIGHT SHOULDER PAIN: ICD-10-CM

## 2024-07-15 PROCEDURE — 97110 THERAPEUTIC EXERCISES: CPT | Performed by: PHYSICAL THERAPIST

## 2024-07-15 PROCEDURE — 97140 MANUAL THERAPY 1/> REGIONS: CPT | Performed by: PHYSICAL THERAPIST

## 2024-07-15 NOTE — PROGRESS NOTES
"Daily Note     Today's date: 7/15/2024  Patient name: Heather Holt  : 1950  MRN: [id number]  Referring provider: Sonia Real MD  Dx:   Encounter Diagnosis     ICD-10-CM    1. History of total knee replacement, left  Z96.652       2. Chronic pain of left knee  M25.562     G89.29       3. Chronic right shoulder pain  M25.511     G89.29                        Subjective:  Pt reports c/c of stiffness upon presentation.      Objective: See treatment diary below      Assessment: Tolerated treatment well (+) Lat trunk shift b/l persists throughout gait. Pt demonstrates most soreness with end range flexion PROM.  ROM WFL's.      Plan: Continue per plan of care.  D/C to ind HEP after this week.     Precautions: HTN, IBS, lumbar spondylosis, OP, FM, h/o R DL and R TKA    1 2 3 4 5 6    pre-op 6/3 post-op 6/7 6/10 6/14 6/17   7 8 9 10 11 12    RA  6/26  7/3  7/8  7/10    13 14 15 16 17 18   7/15              19 20 21 22 23 24                 25 26 27 28 29 30                          Manuals 7/8 7/10 7/15 6/14 6/17 6/19  6/24  6/26  7/1 7/3   PROM L knee BG BG BG BG BG BG  BG  BG  SA NB   RA           nv  BG         add R RTC to POC              BG                               Neuro Re-Ed                      4-way shoulder isometrics              nv  5\"x10ea  5\"x10ea     Tb ext/retract              nv  scap retract 5\"x20  scap retract 5\"x20     TB IR/ER              nv        S/l hip abd           nv          clamshells           nv          bridges           nv          Standing hip abd  2x10ea  2.5# x20ea 2.5# 2x10 2x10 2x10 2x10  2# 2x10ea  2# 2x10ea  2# 2x10ea 2# 2x10 ea   Ther Ex 7/8 7/10 7/15 6/14 6/17 6/19  6/24  6/26  7/1 7/3   bike x10' x10' x10' x10' x10' x10'  x10'  x10'  x10' x10'   HR x20 2x10 x20 x20 x20 x20  x20  x20  x20 x20   Mini squats x20 x20 x10 2x10 x20 x20  x20  x20  x20 x20   LAQ/SAQ 2.5# 5\"x20ea 2.5# 5''x20 ea 2.5# 5\"x20ea 5\"x20ea 5\"x20ea 5\"x20ea  2# 5\"x20ea  2# " "5\"x20ea  2# 5\"x20ea 2# 5\"x20 ea   Heel slides 10\"x20 10''x10 10\"x20 10\"x20 10\"x20 10\"x20  10\"x10  10\"x20  10\"x20 10\"x20   Quad sets 5\"x20 5''x20 5\"x20 5\"x20 5\"x20 5\"x20  5\"x20  5\"x20  5\"x20 5\"x20   SLR flex  2x10  2x10 2x10  2x10 W/A 2x10 2x10  2x10  2x10  2x10 2x10   Lunge on step                      Ther Activity                      Sit-stand                      Stair training   step ups fwd/lat 6\"x20ea  step ups 6\"x20 fwd/lat x20ea  step ups  8\"x20ea nv Reciprical x5 laps Step ups 8\"x20, lat 6\"x20  step ups 6\"x20. Lat x20  step ups 6\"x20ea , fwd and lat  step ups 6\"x20ea , fwd and lat Step ups 6\"x20 ea fwd and lat   Gait Training       Nv with SPC W/SPC BG                                                          Modalities   6/10 6/14 6/17            CP     x10' x10' NP                                            "

## 2024-07-17 ENCOUNTER — OFFICE VISIT (OUTPATIENT)
Dept: PHYSICAL THERAPY | Facility: MEDICAL CENTER | Age: 74
End: 2024-07-17
Payer: MEDICARE

## 2024-07-17 DIAGNOSIS — Z96.652 HISTORY OF TOTAL KNEE REPLACEMENT, LEFT: Primary | ICD-10-CM

## 2024-07-17 DIAGNOSIS — G89.29 CHRONIC RIGHT SHOULDER PAIN: ICD-10-CM

## 2024-07-17 DIAGNOSIS — M25.562 CHRONIC PAIN OF LEFT KNEE: ICD-10-CM

## 2024-07-17 DIAGNOSIS — M25.511 CHRONIC RIGHT SHOULDER PAIN: ICD-10-CM

## 2024-07-17 DIAGNOSIS — G89.29 CHRONIC PAIN OF LEFT KNEE: ICD-10-CM

## 2024-07-17 PROCEDURE — 97110 THERAPEUTIC EXERCISES: CPT | Performed by: PHYSICAL THERAPIST

## 2024-07-17 PROCEDURE — 97140 MANUAL THERAPY 1/> REGIONS: CPT | Performed by: PHYSICAL THERAPIST

## 2024-07-17 NOTE — PROGRESS NOTES
"Daily Note     Today's date: 2024  Patient name: Heather Holt  : 1950  MRN: [id number]  Referring provider: Sonia Real MD  Dx:   Encounter Diagnosis     ICD-10-CM    1. History of total knee replacement, left  Z96.652       2. Chronic pain of left knee  M25.562     G89.29       3. Chronic right shoulder pain  M25.511     G89.29                        Subjective:  Pt reports c/c of stiffness upon presentation.      Objective: See treatment diary below      Assessment: Tolerated treatment well (+) Lat trunk shift b/l persists throughout gait. Pt is ind with all HEP at this and states she has returned to previous level of function.      Plan:  D/C to ind HEP.     Precautions: HTN, IBS, lumbar spondylosis, OP, FM, h/o R DL and R TKA    1 2 3 4 5 6    pre-op 6/3 post-op 6/7 6/10 6/14 6/17   7 8 9 10 11 12    RA  6/26  7/3  7/8  7/10    13 14 15 16 17 18   7/15  7/17            19 20 21 22 23 24                 25 26 27 28 29 30                          Manuals 7/8 7/10 7/15 7/17 6/17 6/19  6/24  6/26  7/1 7/3   PROM L knee BG BG BG BG BG BG  BG  BG  SA NB   RA           nv  BG         add R RTC to POC              BG                               Neuro Re-Ed                      4-way shoulder isometrics              nv  5\"x10ea  5\"x10ea     Tb ext/retract              nv  scap retract 5\"x20  scap retract 5\"x20     TB IR/ER              nv        S/l hip abd           nv          clamshells           nv          bridges           nv          Standing hip abd  2x10ea  2.5# x20ea 2.5# 2x10 2.5# 2x10 2x10 2x10  2# 2x10ea  2# 2x10ea  2# 2x10ea 2# 2x10 ea   Ther Ex 7/8 7/10 7/15 7/17 6/17 6/19  6/24  6/26  7/1 7/3   bike x10' x10' x10' x10' x10' x10'  x10'  x10'  x10' x10'   HR x20 2x10 x20 x20 x20 x20  x20  x20  x20 x20   Mini squats x20 x20 x10 2x10 x20 x20  x20  x20  x20 x20   LAQ/SAQ 2.5# 5\"x20ea 2.5# 5''x20 ea 2.5# 5\"x20ea 2.5# 5\"x20ea 5\"x20ea 5\"x20ea  2# 5\"x20ea  2# 5\"x20ea  2# " "5\"x20ea 2# 5\"x20 ea   Heel slides 10\"x20 10''x10 10\"x20 10\"x20 10\"x20 10\"x20  10\"x10  10\"x20  10\"x20 10\"x20   Quad sets 5\"x20 5''x20 5\"x20 5\"x20 5\"x20 5\"x20  5\"x20  5\"x20  5\"x20 5\"x20   SLR flex  2x10  2x10 2x10  2x10 2x10 2x10  2x10  2x10  2x10 2x10   Lunge on step                      Ther Activity                      Sit-stand                      Stair training   step ups fwd/lat 6\"x20ea  step ups 6\"x20 fwd/lat x20ea  step ups  8\"x20ea 8\"x20ea fwd/lat Reciprical x5 laps Step ups 8\"x20, lat 6\"x20  step ups 6\"x20. Lat x20  step ups 6\"x20ea , fwd and lat  step ups 6\"x20ea , fwd and lat Step ups 6\"x20 ea fwd and lat   Gait Training       Nv with SPC W/SPC BG                                                          Modalities   6/10 6/14 6/17            CP     x10' x10' NP                                            "

## 2024-08-20 ENCOUNTER — HOSPITAL ENCOUNTER (OUTPATIENT)
Dept: RADIOLOGY | Facility: HOSPITAL | Age: 74
Discharge: HOME | End: 2024-08-20
Attending: ORTHOPAEDIC SURGERY
Payer: MEDICARE

## 2024-08-20 ENCOUNTER — OFFICE VISIT (OUTPATIENT)
Dept: OBGYN CLINIC | Facility: CLINIC | Age: 74
End: 2024-08-20

## 2024-08-20 DIAGNOSIS — M21.70 LEG LENGTH DISCREPANCY: ICD-10-CM

## 2024-08-20 DIAGNOSIS — Z96.652 S/P TOTAL KNEE ARTHROPLASTY, LEFT: ICD-10-CM

## 2024-08-20 DIAGNOSIS — Z96.652 S/P TOTAL KNEE ARTHROPLASTY, LEFT: Primary | ICD-10-CM

## 2024-08-20 PROCEDURE — 99024 POSTOP FOLLOW-UP VISIT: CPT | Performed by: ORTHOPAEDIC SURGERY

## 2024-08-20 PROCEDURE — 73562 X-RAY EXAM OF KNEE 3: CPT

## 2024-08-20 RX ORDER — AMOXICILLIN 500 MG/1
CAPSULE ORAL
Qty: 4 CAPSULE | Refills: 3 | Status: SHIPPED | OUTPATIENT
Start: 2024-08-20 | End: 2024-09-03

## 2024-08-20 NOTE — PROGRESS NOTES
Assessment:  1. S/P total knee arthroplasty, left  XR knee 3 vw left non injury      2. Leg length discrepancy  Durable Medical Equipment          Plan:    Patient is 11 weeks s/p left total knee arthroplasty, DOS 24.   Weightbearing as tolerated   XR left knee was reviewed in the office today   Order was placed for DME 3/4 inch shoe insert/ shoe lift for the left leg   Patient is aware she will be on lifetime antibiotics for dental prophylaxis  Repeat x-ray of the left knee at the next office visit  Patient is to follow-up in 9 months        The above stated was discussed in layman's terms and the patient expressed understanding.  All questions were answered to the patient's satisfaction.         Subjective:   Heather Holt is a 74 y.o. female who presents today 11 weeks s/p left total knee arthroplasty, DOS 24. Patient states she is doing well. She denies any pain or discomfort in the knee. She has no  issues going up or down the steps. She is currently taking no medication.     Patient has hx of left posterior total hip arthroplasty in 2019 by Dr. Ramirez . Patent states she feels her left leg is shorter and feels she walks uneven  when walking.       Review of systems negative unless otherwise specified in HPI    Past Medical History:   Diagnosis Date    Anxiety     Fibromyalgia     H/O cardiovascular stress test 2018    H/O echocardiogram 2017    HBP (high blood pressure)     Hypothyroidism     IBS (irritable bowel syndrome)     PONV (postoperative nausea and vomiting)     Vascular disease     Varicose veins       Past Surgical History:   Procedure Laterality Date    CARDIAC CATHETERIZATION      EF 50%. No significant CAD.     SECTION      CHOLECYSTECTOMY      HERNIA REPAIR      MI ARTHRP KNE CONDYLE&PLATU MEDIAL&LAT COMPARTMENTS Left 2024    Procedure: ARTHROPLASTY KNEE TOTAL;  Surgeon: Sonia Real MD;  Location:  MAIN OR;  Service: Orthopedics    REPLACEMENT TOTAL  KNEE Right 2013    TOTAL HIP ARTHROPLASTY Right 2019    VEIN SURGERY Bilateral     2002 Dr Castrejon       Family History   Problem Relation Age of Onset    Cancer Mother     Heart failure Mother     Heart disease Mother     Lung cancer Father     Hypertension Sister     Breast cancer Sister         over 50    No Known Problems Sister     No Known Problems Sister     No Known Problems Sister     Lung cancer Sister     Cancer Sister     Skin cancer Sister     No Known Problems Daughter     No Known Problems Maternal Grandmother     No Known Problems Maternal Grandfather     No Known Problems Paternal Grandmother     No Known Problems Paternal Grandfather     Hypertension Brother     Heart disease Brother     Cancer Brother     Prostate cancer Brother     No Known Problems Brother     No Known Problems Brother     No Known Problems Brother     No Known Problems Son     No Known Problems Son     Coronary artery disease Other        Social History     Occupational History    Occupation: Retired   Tobacco Use    Smoking status: Never    Smokeless tobacco: Never    Tobacco comments:     pt quit in her 30s   Vaping Use    Vaping status: Never Used   Substance and Sexual Activity    Alcohol use: Never    Drug use: Never    Sexual activity: Not Currently     Birth control/protection: Post-menopausal         Current Outpatient Medications:     acetaminophen (TYLENOL) 500 mg tablet, Take 500 mg by mouth every 6 (six) hours as needed for mild pain, Disp: , Rfl:     amoxicillin (AMOXIL) 500 mg capsule, Take 2,000 mg by mouth 60 minutes pre-procedure (Patient not taking: Reported on 3/28/2023), Disp: , Rfl:     ascorbic acid (VITAMIN C) 500 MG tablet, Take 1 tablet (500 mg total) by mouth 2 (two) times a day, Disp: 60 tablet, Rfl: 1    aspirin (ECOTRIN LOW STRENGTH) 81 mg EC tablet, Take 1 tablet (81 mg total) by mouth 2 (two) times a day, Disp: 70 tablet, Rfl: 0    calcium carbonate (OS-INGRID) 600 MG tablet, Take 600 mg by mouth 3  (three) times a week, Disp: , Rfl:     Cholecalciferol (Vitamin D) 50 MCG (2000 UT) tablet, Take 2,000 Units by mouth 3 (three) times a week, Disp: , Rfl:     dexlansoprazole (Dexilant) 60 MG capsule, Take 60 mg by mouth daily, Disp: , Rfl:     esomeprazole (NexIUM) 20 mg capsule, Take 20 mg by mouth every evening, Disp: , Rfl:     famotidine (PEPCID) 20 mg tablet, , Disp: , Rfl:     ferrous sulfate 324 (65 Fe) mg, TAKE 1 TABLET BY MOUTH 2 TIMES A DAY BEFORE MEALS, Disp: 60 tablet, Rfl: 1    folic acid (FOLVITE) 1 mg tablet, TAKE 1 TABLET BY MOUTH EVERY DAY (Patient not taking: Reported on 6/24/2024), Disp: 90 tablet, Rfl: 1    Krill Oil Omega-3 500 MG CAPS, Take 350 mg by mouth every other day, Disp: , Rfl:     latanoprost (XALATAN) 0.005 % ophthalmic solution, 1 drop daily at bedtime, Disp: , Rfl:     Linzess 72 MCG CAPS, 72 mcg daily in the early morning , Disp: , Rfl:     losartan (COZAAR) 100 MG tablet, Take 100 mg by mouth daily at bedtime, Disp: , Rfl:     losartan-hydrochlorothiazide (HYZAAR) 100-25 MG per tablet, Take 1 tablet by mouth as needed (Patient not taking: Reported on 10/21/2022), Disp: , Rfl:     metoprolol succinate (TOPROL-XL) 50 mg 24 hr tablet, 50 mg daily, Disp: , Rfl:     metoprolol tartrate (LOPRESSOR) 50 mg tablet, Take 50 mg by mouth every 12 (twelve) hours (Patient not taking: Reported on 8/15/2023), Disp: , Rfl:     Multiple Vitamins-Minerals (ALIVE WOMENS 50+ GUMMY PO), Take by mouth, Disp: , Rfl:     oxyCODONE (Roxicodone) 5 immediate release tablet, Take 1 tablet (5 mg total) by mouth every 4 (four) hours as needed for moderate pain Max Daily Amount: 30 mg, Disp: 40 tablet, Rfl: 0    Synthroid 88 MCG tablet, , Disp: , Rfl:     Allergies   Allergen Reactions    Escitalopram Diarrhea and GI Intolerance    Food Throat Swelling     FAT FREE SALAD DRESSING    Hydrocodone-Acetaminophen Other (See Comments)     Heat intolerance, flushing    Lactose Intolerance [Tilactase] Abdominal Pain     Meloxicam Other (See Comments)     Other reaction(s): nose bleeds    Methylprednisolone Other (See Comments)     Red face    Niacin Facial Swelling     Other reaction(s): Unknown Allergic Reaction    Norco [Hydrocodone-Acetaminophen] Other (See Comments)     Heat intolerance, flushing    Gabapentin Anxiety          There were no vitals filed for this visit.  There is no height or weight on file to calculate BMI.  Wt Readings from Last 3 Encounters:   06/24/24 96.6 kg (213 lb)   06/12/24 95.3 kg (210 lb)   05/30/24 95.3 kg (210 lb)       Objective:            Physical Exam  Physical Exam:      General Appearance:    Alert, cooperative, no distress, appears stated age   Head:    Normocephalic, without obvious abnormality, atraumatic   Eyes:    conjunctiva/corneas clear, both eyes         Nose:   Nares normal, septum midline, no drainage    Throat:   Lips normal; teeth and gums normal   Neck:    symmetrical, trachea midline, ;     thyroid:  no enlargement/   Back:     Symmetric, no curvature, ROM normal   Lungs:   No audible wheezing or labored breathing   Chest Wall:    No tenderness or deformity    Heart:    Regular rate and rhythm                         Pulses:   2+ and symmetric all extremities   Skin:   Skin color, texture, turgor normal, no rashes or lesions   Neurologic:   normal strength, sensation and reflexes     throughout                       Ortho Exam  Left  Knee  Surgical incision well healed   No erythema or open wounds  Mild effusion  No Tenderness palpation of medial joint line  No lateral joint line tenderness  Near full extension  Full flexion  Patellar grind test +/-  Stable to varus and valgus stress  Negative posterior drawer  Negative Lachman test  Neurovascular intact distally       Diagnostics, reviewed and taken today if performed as documented:    The attending physician has personally reviewed the pertinent films in PACS and interpretation is as follows: XR left knee demonstrates s/p TKA  "adequate left implant with no sign of loosening      Procedures, if performed today:    Procedures    None performed      Scribe Attestation      I,:  Isidra Mckoy MA am acting as a scribe while in the presence of the attending physician.:       I,:  Sonia Real MD personally performed the services described in this documentation    as scribed in my presence.:               Portions of the record may have been created with voice recognition software.  Occasional wrong word or \"sound a like\" substitutions may have occurred due to the inherent limitations of voice recognition software.  Read the chart carefully and recognize, using context, where substitutions have occurred.  "

## 2024-08-20 NOTE — PATIENT INSTRUCTIONS
[facility]                                                                                               Dr. Sonia Real                                                                                                            ANTIBIOTIC USE FOR JOINT REPLACEMENT PATIENTS  You have had surgery to replace one of your joints with a metal prosthesis. Going forward, you should take oral antibiotics before any dental work, including routine cleanings. These procedures are a potential source of injection. If you develop an infection, it could spread to your operative joint and cause complications.  Please show the following guidelines to your medical doctor and dentist, so he/she can prescribe the appropriate medications.  The following information is recommended by the [employer], [employer], and Orthopedic Associations:  STANDARD GENERAL PROPHYLAXIS  antibiotic prophylaxis recommended lifetime  Recommend refraining from dental procedures until 3 months post operatively  Amoxicillin for Adults:    500mg - Take 4 capsules (2 grams) orally one hour before the procedure  If allergic to Penicillin  Clindamycin for Adults:   300mg - Take 2 capsules (600 mg) orally one hour before the procedure  Azithromycin for Adults:  250mg - Take 2 capsules (500 mg) orally one hour before the procedure  Cephalexin for Adults:     500mg - Take 4 capsules (2 grams) orally one hour before the procedure  Note: Cephalosporins should not be used if you have ever developed an immediate hypersensitivity reaction (i.e., hives, swelling, severe itching, difficulty breathing) to Penicillin  Please feel free to contact our office at [phone] with questions or concerns.

## 2024-09-06 ENCOUNTER — APPOINTMENT (OUTPATIENT)
Dept: LAB | Facility: MEDICAL CENTER | Age: 74
End: 2024-09-06
Payer: MEDICARE

## 2024-09-06 DIAGNOSIS — E11.9 DIABETES MELLITUS WITHOUT COMPLICATION (HCC): ICD-10-CM

## 2024-09-06 LAB
ALBUMIN SERPL BCG-MCNC: 4 G/DL (ref 3.5–5)
ALP SERPL-CCNC: 100 U/L (ref 34–104)
ALT SERPL W P-5'-P-CCNC: 15 U/L (ref 7–52)
ANION GAP SERPL CALCULATED.3IONS-SCNC: 7 MMOL/L (ref 4–13)
AST SERPL W P-5'-P-CCNC: 21 U/L (ref 13–39)
BASOPHILS # BLD AUTO: 0.03 THOUSANDS/ÂΜL (ref 0–0.1)
BASOPHILS NFR BLD AUTO: 1 % (ref 0–1)
BILIRUB SERPL-MCNC: 1.13 MG/DL (ref 0.2–1)
BUN SERPL-MCNC: 22 MG/DL (ref 5–25)
CALCIUM SERPL-MCNC: 9.8 MG/DL (ref 8.4–10.2)
CHLORIDE SERPL-SCNC: 102 MMOL/L (ref 96–108)
CHOLEST SERPL-MCNC: 187 MG/DL
CO2 SERPL-SCNC: 29 MMOL/L (ref 21–32)
CREAT SERPL-MCNC: 0.57 MG/DL (ref 0.6–1.3)
EOSINOPHIL # BLD AUTO: 0.08 THOUSAND/ÂΜL (ref 0–0.61)
EOSINOPHIL NFR BLD AUTO: 1 % (ref 0–6)
ERYTHROCYTE [DISTWIDTH] IN BLOOD BY AUTOMATED COUNT: 13.9 % (ref 11.6–15.1)
EST. AVERAGE GLUCOSE BLD GHB EST-MCNC: 108 MG/DL
GFR SERPL CREATININE-BSD FRML MDRD: 91 ML/MIN/1.73SQ M
GLUCOSE P FAST SERPL-MCNC: 95 MG/DL (ref 65–99)
HBA1C MFR BLD: 5.4 %
HCT VFR BLD AUTO: 40.3 % (ref 34.8–46.1)
HDLC SERPL-MCNC: 52 MG/DL
HGB BLD-MCNC: 13 G/DL (ref 11.5–15.4)
IMM GRANULOCYTES # BLD AUTO: 0.03 THOUSAND/UL (ref 0–0.2)
IMM GRANULOCYTES NFR BLD AUTO: 1 % (ref 0–2)
LDLC SERPL CALC-MCNC: 121 MG/DL (ref 0–100)
LYMPHOCYTES # BLD AUTO: 2.11 THOUSANDS/ÂΜL (ref 0.6–4.47)
LYMPHOCYTES NFR BLD AUTO: 34 % (ref 14–44)
MCH RBC QN AUTO: 30.5 PG (ref 26.8–34.3)
MCHC RBC AUTO-ENTMCNC: 32.3 G/DL (ref 31.4–37.4)
MCV RBC AUTO: 95 FL (ref 82–98)
MONOCYTES # BLD AUTO: 0.7 THOUSAND/ÂΜL (ref 0.17–1.22)
MONOCYTES NFR BLD AUTO: 11 % (ref 4–12)
NEUTROPHILS # BLD AUTO: 3.29 THOUSANDS/ÂΜL (ref 1.85–7.62)
NEUTS SEG NFR BLD AUTO: 52 % (ref 43–75)
NONHDLC SERPL-MCNC: 135 MG/DL
NRBC BLD AUTO-RTO: 0 /100 WBCS
PLATELET # BLD AUTO: 315 THOUSANDS/UL (ref 149–390)
PMV BLD AUTO: 10 FL (ref 8.9–12.7)
POTASSIUM SERPL-SCNC: 4.4 MMOL/L (ref 3.5–5.3)
PROT SERPL-MCNC: 7.3 G/DL (ref 6.4–8.4)
RBC # BLD AUTO: 4.26 MILLION/UL (ref 3.81–5.12)
SODIUM SERPL-SCNC: 138 MMOL/L (ref 135–147)
TRIGL SERPL-MCNC: 69 MG/DL
WBC # BLD AUTO: 6.24 THOUSAND/UL (ref 4.31–10.16)

## 2024-09-06 PROCEDURE — 80061 LIPID PANEL: CPT

## 2024-09-06 PROCEDURE — 83036 HEMOGLOBIN GLYCOSYLATED A1C: CPT

## 2024-09-06 PROCEDURE — 36415 COLL VENOUS BLD VENIPUNCTURE: CPT

## 2024-09-06 PROCEDURE — 85025 COMPLETE CBC W/AUTO DIFF WBC: CPT

## 2024-09-06 PROCEDURE — 80053 COMPREHEN METABOLIC PANEL: CPT

## 2024-10-15 ENCOUNTER — HOSPITAL ENCOUNTER (EMERGENCY)
Facility: HOSPITAL | Age: 74
Discharge: HOME | End: 2024-10-15
Attending: EMERGENCY MEDICINE
Payer: MEDICARE

## 2024-10-15 ENCOUNTER — APPOINTMENT (EMERGENCY)
Dept: RADIOLOGY | Facility: HOSPITAL | Age: 74
End: 2024-10-15
Payer: MEDICARE

## 2024-10-15 VITALS
SYSTOLIC BLOOD PRESSURE: 140 MMHG | OXYGEN SATURATION: 98 % | TEMPERATURE: 97.7 F | DIASTOLIC BLOOD PRESSURE: 66 MMHG | HEART RATE: 56 BPM | RESPIRATION RATE: 18 BRPM

## 2024-10-15 DIAGNOSIS — S93.401A RIGHT ANKLE SPRAIN: Primary | ICD-10-CM

## 2024-10-15 DIAGNOSIS — M21.70 LEG LENGTH DISCREPANCY: ICD-10-CM

## 2024-10-15 PROCEDURE — 73610 X-RAY EXAM OF ANKLE: CPT

## 2024-10-15 PROCEDURE — 73630 X-RAY EXAM OF FOOT: CPT

## 2024-10-15 PROCEDURE — 73590 X-RAY EXAM OF LOWER LEG: CPT

## 2024-10-15 PROCEDURE — 99284 EMERGENCY DEPT VISIT MOD MDM: CPT | Performed by: EMERGENCY MEDICINE

## 2024-10-15 PROCEDURE — 99283 EMERGENCY DEPT VISIT LOW MDM: CPT

## 2024-10-15 NOTE — ED PROVIDER NOTES
Time reflects when diagnosis was documented in both MDM as applicable and the Disposition within this note       Time User Action Codes Description Comment    10/15/2024 10:08 AM Alanna Hoffman Add [S93.401A] Right ankle sprain     10/15/2024 10:13 AM Alanna Hoffman Add [M21.70] Leg length discrepancy           ED Disposition       ED Disposition   Discharge    Condition   Stable    Date/Time   Tue Oct 15, 2024 10:08 AM    Comment   Heather Holt discharge to home/self care.                   Assessment & Plan       Medical Decision Making  74-year-old female presents with acute right foot and ankle pain after twisting type injury yesterday.  Differential diagnosis includes but is not limited to acute foot/ankle sprain/strain, fracture, dislocation, soft tissue injury, stress fracture.    Problems Addressed:  Leg length discrepancy: chronic illness or injury  Right ankle sprain: acute illness or injury    Amount and/or Complexity of Data Reviewed  Independent Historian: spouse     Details: Patient's  at bedside to provide history  Radiology: ordered and independent interpretation performed.     Details: X-rays ordered and independently interpreted by me, my interpretation is no acute bony abnormality.    Risk  Risk Details: 74-year-old female presents with acute on chronic right foot pain.  Patient's x-ray is negative for acute fracture, suspect acute sprain.  Patient having significant difficulty ambulating due to pain.  Will place in walking boot and have patient follow-up with sports medicine or orthopedics for reevaluation.  Patient declined pain medication.  Discussed signs and symptoms to return to the emergency department.  Patient felt comfortable discharge plan.             Medications - No data to display    ED Risk Strat Scores                                               History of Present Illness       Chief Complaint   Patient presents with    Foot Pain     Patient reports right foot pain for  several months, has seen podiatry outpatient, yesterday stepped wrong in new sneakers and pain increased       Past Medical History:   Diagnosis Date    Anxiety     Fibromyalgia     H/O cardiovascular stress test     H/O echocardiogram 2017    HBP (high blood pressure)     Hypothyroidism     IBS (irritable bowel syndrome)     PONV (postoperative nausea and vomiting)     Vascular disease     Varicose veins      Past Surgical History:   Procedure Laterality Date    CARDIAC CATHETERIZATION      EF 50%. No significant CAD.     SECTION      CHOLECYSTECTOMY      HERNIA REPAIR      NE ARTHRP KNE CONDYLE&PLATU MEDIAL&LAT COMPARTMENTS Left 2024    Procedure: ARTHROPLASTY KNEE TOTAL;  Surgeon: Sonia Real MD;  Location:  MAIN OR;  Service: Orthopedics    REPLACEMENT TOTAL KNEE Right 2013    TOTAL HIP ARTHROPLASTY Right 2019    VEIN SURGERY Bilateral      Dr Castrejon      Family History   Problem Relation Age of Onset    Cancer Mother     Heart failure Mother     Heart disease Mother     Lung cancer Father     Hypertension Sister     Breast cancer Sister         over 50    No Known Problems Sister     No Known Problems Sister     No Known Problems Sister     Lung cancer Sister     Cancer Sister     Skin cancer Sister     No Known Problems Daughter     No Known Problems Maternal Grandmother     No Known Problems Maternal Grandfather     No Known Problems Paternal Grandmother     No Known Problems Paternal Grandfather     Hypertension Brother     Heart disease Brother     Cancer Brother     Prostate cancer Brother     No Known Problems Brother     No Known Problems Brother     No Known Problems Brother     No Known Problems Son     No Known Problems Son     Coronary artery disease Other       Social History     Tobacco Use    Smoking status: Never    Smokeless tobacco: Never    Tobacco comments:     pt quit in her 30s   Vaping Use    Vaping status: Never Used   Substance Use Topics    Alcohol  "use: Never    Drug use: Never      E-Cigarette/Vaping    E-Cigarette Use Never User       E-Cigarette/Vaping Substances      I have reviewed and agree with the history as documented.     74-year-old female presents emergency department accompanied by her  for evaluation of right foot and ankle pain.  Patient states that she has been having discomfort in the right foot for several months.  She attributes this to complications related to prior right hip surgery.  She states that she has a leg length discrepancy.  After having her left knee replaced in May she had been more dependent on the right leg.  The right foot has become more painful.  She did follow-up with podiatry but did not have much relief with suggested treatments.  Patient states yesterday she was wearing a new \"Hoka\" sneaker with a large sole.  She got her foot caught on tile and while twisting felt a pop sensation along the top of the foot and ankle.  She is now having pain when she bears weight that radiates into the calf and knee region on the right.  She did not fall or have other injuries.  She has been able to bear weight today with some difficulty.  It is most significant when patient attempts to dorsiflex the foot.      History provided by:  Patient and medical records   used: No    Ankle Pain  Location:  Foot and ankle  Time since incident:  1 day  Injury: yes    Ankle location:  R ankle  Foot location:  Dorsum of R foot  Pain details:     Quality:  Aching    Radiates to:  R leg    Severity:  Moderate    Onset quality:  Sudden    Duration:  1 day    Timing:  Constant    Progression:  Worsening  Chronicity:  New  Foreign body present:  No foreign bodies  Prior injury to area:  Yes  Relieved by:  NSAIDs  Worsened by:  Nothing  Associated symptoms: decreased ROM    Associated symptoms: no back pain, no fever, no neck pain, no numbness, no swelling and no tingling    Risk factors: no frequent fractures        Review of " Systems   Constitutional:  Negative for appetite change and fever.   Musculoskeletal:  Positive for arthralgias and gait problem. Negative for back pain and neck pain.   Skin:  Negative for wound.   Neurological:  Negative for weakness, numbness and headaches.   All other systems reviewed and are negative.          Objective       ED Triage Vitals [10/15/24 0904]   Temperature Pulse Blood Pressure Respirations SpO2 Patient Position - Orthostatic VS   97.7 °F (36.5 °C) 56 140/66 18 98 % Sitting      Temp Source Heart Rate Source BP Location FiO2 (%) Pain Score    Oral Monitor Right arm -- 8      Vitals      Date and Time Temp Pulse SpO2 Resp BP Pain Score FACES Pain Rating User   10/15/24 0904 97.7 °F (36.5 °C) 56 98 % 18 140/66 8 -- NR            Physical Exam  Constitutional:       General: She is not in acute distress.     Appearance: She is well-developed. She is not ill-appearing.   HENT:      Head: Normocephalic.      Nose: Nose normal.      Mouth/Throat:      Pharynx: No oropharyngeal exudate.   Eyes:      Conjunctiva/sclera: Conjunctivae normal.      Pupils: Pupils are equal, round, and reactive to light.   Cardiovascular:      Rate and Rhythm: Normal rate and regular rhythm.      Pulses:           Dorsalis pedis pulses are 2+ on the right side.        Posterior tibial pulses are 2+ on the right side.      Heart sounds: Normal heart sounds.   Pulmonary:      Effort: Pulmonary effort is normal.      Breath sounds: Normal breath sounds.   Abdominal:      General: Bowel sounds are normal. There is no distension.      Palpations: Abdomen is soft.      Tenderness: There is no abdominal tenderness. There is no guarding or rebound.   Musculoskeletal:         General: No deformity.      Cervical back: Normal range of motion and neck supple.      Right ankle: No deformity or ecchymosis. Tenderness present over the lateral malleolus, medial malleolus, ATF ligament, AITF ligament, posterior TF ligament and proximal  fibula. No base of 5th metatarsal tenderness. Decreased range of motion. Anterior drawer test negative. Normal pulse.      Left ankle: Normal. Normal range of motion.      Right foot: Decreased range of motion. No deformity.   Feet:      Right foot:      Skin integrity: Skin integrity normal.      Toenail Condition: Right toenails are normal.   Lymphadenopathy:      Cervical: No cervical adenopathy.   Skin:     General: Skin is warm and dry.      Findings: No rash.   Neurological:      General: No focal deficit present.      Mental Status: She is alert and oriented to person, place, and time.      Cranial Nerves: No cranial nerve deficit.      Sensory: No sensory deficit.      Motor: No weakness or abnormal muscle tone.      Coordination: Coordination normal.      Gait: Gait normal.      Deep Tendon Reflexes: Reflexes are normal and symmetric.   Psychiatric:         Mood and Affect: Mood normal.         Behavior: Behavior normal.         Thought Content: Thought content normal.         Judgment: Judgment normal.       Results Reviewed       None            XR ankle 3+ views RIGHT   ED Interpretation by Alanna Hoffman DO (10/15 1008)   No fracture      Final Interpretation by Ling Rachel MD (10/15 1026)   No acute osseous abnormality.         Computerized Assisted Algorithm (CAA) may have been used to analyze all applicable images.               Workstation performed: [id number]         XR foot 3+ views RIGHT   ED Interpretation by Alanna Hoffman DO (10/15 1008)   No fracture      Final Interpretation by Ling Rachel MD (10/15 1026)   No acute osseous abnormality.         Computerized Assisted Algorithm (CAA) may have been used to analyze all applicable images.               Workstation performed: [id number]         XR tibia fibula 2 views RIGHT   ED Interpretation by Alanna Hoffman DO (10/15 1008)   No fracture      Final Interpretation by Ling Rachel MD (10/15 1026)   No acute osseous abnormality.          Computerized Assisted Algorithm (CAA) may have been used to analyze all applicable images.               Workstation performed: [id number]             Splint application    Date/Time: 10/15/2024 12:10 PM    Performed by: Alanna Hoffman DO  Authorized by: Alanna Hoffman DO  [place] Protocol:  Procedure performed by:  Consent: Verbal consent obtained.  Consent given by: patient  Patient identity confirmed: verbally with patient    Pre-procedure details:     Sensation:  Normal  Procedure details:     Laterality:  Right    Location:  Ankle    Ankle:  R ankle    Strapping: no  Cast type:  Short leg walking      Post-procedure details:     Pain:  Unchanged    Sensation:  Normal    Patient tolerance of procedure:  Tolerated well, no immediate complications      ED Medication and Procedure Management   Prior to Admission Medications   Prescriptions Last Dose Informant Patient Reported? Taking?   Cholecalciferol (Vitamin D) 50 MCG (2000 UT) tablet   Yes No   Sig: Take 2,000 Units by mouth 3 (three) times a week   Krill Oil Omega-3 500 MG CAPS   Yes No   Sig: Take 350 mg by mouth every other day   Linzess 72 MCG CAPS  Self Yes No   Si mcg daily in the early morning    Multiple Vitamins-Minerals (ALIVE WOMENS 50+ GUMMY PO)   Yes No   Sig: Take by mouth   Synthroid 88 MCG tablet  Self Yes No   acetaminophen (TYLENOL) 500 mg tablet   Yes No   Sig: Take 500 mg by mouth every 6 (six) hours as needed for mild pain   amoxicillin (AMOXIL) 500 mg capsule  Self Yes No   Sig: Take 2,000 mg by mouth 60 minutes pre-procedure   Patient not taking: Reported on 3/28/2023   ascorbic acid (VITAMIN C) 500 MG tablet   No No   Sig: Take 1 tablet (500 mg total) by mouth 2 (two) times a day   aspirin (ECOTRIN LOW STRENGTH) 81 mg EC tablet   No No   Sig: Take 1 tablet (81 mg total) by mouth 2 (two) times a day   calcium carbonate (OS-INGRID) 600 MG tablet   Yes No   Sig: Take 600 mg by mouth 3 (three) times a week   dexlansoprazole (Dexilant) 60 MG  capsule  Self Yes No   Sig: Take 60 mg by mouth daily   esomeprazole (NexIUM) 20 mg capsule  Self Yes No   Sig: Take 20 mg by mouth every evening   famotidine (PEPCID) 20 mg tablet  Self Yes No   ferrous sulfate 324 (65 Fe) mg   No No   Sig: TAKE 1 TABLET BY MOUTH 2 TIMES A DAY BEFORE MEALS   folic acid (FOLVITE) 1 mg tablet   No No   Sig: TAKE 1 TABLET BY MOUTH EVERY DAY   Patient not taking: Reported on 2024   latanoprost (XALATAN) 0.005 % ophthalmic solution  Self Yes No   Si drop daily at bedtime   losartan (COZAAR) 100 MG tablet  Self Yes No   Sig: Take 100 mg by mouth daily at bedtime   losartan-hydrochlorothiazide (HYZAAR) 100-25 MG per tablet  Self Yes No   Sig: Take 1 tablet by mouth as needed   Patient not taking: Reported on 10/21/2022   metoprolol succinate (TOPROL-XL) 50 mg 24 hr tablet  Self Yes No   Si mg daily   metoprolol tartrate (LOPRESSOR) 50 mg tablet  Self Yes No   Sig: Take 50 mg by mouth every 12 (twelve) hours   Patient not taking: Reported on 8/15/2023   oxyCODONE (Roxicodone) 5 immediate release tablet   No No   Sig: Take 1 tablet (5 mg total) by mouth every 4 (four) hours as needed for moderate pain Max Daily Amount: 30 mg      Facility-Administered Medications: None     Discharge Medication List as of 10/15/2024 10:10 AM        CONTINUE these medications which have NOT CHANGED    Details   acetaminophen (TYLENOL) 500 mg tablet Take 500 mg by mouth every 6 (six) hours as needed for mild pain, Historical Med      amoxicillin (AMOXIL) 500 mg capsule Take 2,000 mg by mouth 60 minutes pre-procedure, Starting 2022, Historical Med      ascorbic acid (VITAMIN C) 500 MG tablet Take 1 tablet (500 mg total) by mouth 2 (two) times a day, Starting 2024, Normal      aspirin (ECOTRIN LOW STRENGTH) 81 mg EC tablet Take 1 tablet (81 mg total) by mouth 2 (two) times a day, Starting 2024, Until 2024, Normal      calcium carbonate (OS-INGRID) 600 MG tablet Take  600 mg by mouth 3 (three) times a week, Historical Med      Cholecalciferol (Vitamin D) 50 MCG (2000 UT) tablet Take 2,000 Units by mouth 3 (three) times a week, Historical Med      dexlansoprazole (Dexilant) 60 MG capsule Take 60 mg by mouth daily, Historical Med      esomeprazole (NexIUM) 20 mg capsule Take 20 mg by mouth every evening, Historical Med      famotidine (PEPCID) 20 mg tablet Starting Tue 8/16/2022, Historical Med      ferrous sulfate 324 (65 Fe) mg TAKE 1 TABLET BY MOUTH 2 TIMES A DAY BEFORE MEALS, Starting Mon 6/24/2024, Normal      folic acid (FOLVITE) 1 mg tablet TAKE 1 TABLET BY MOUTH EVERY DAY, Starting Tue 6/18/2024, Normal      Krill Oil Omega-3 500 MG CAPS Take 350 mg by mouth every other day, Historical Med      latanoprost (XALATAN) 0.005 % ophthalmic solution 1 drop daily at bedtime, Historical Med      Linzess 72 MCG CAPS 72 mcg daily in the early morning , Starting Mon 4/2/2018, Historical Med      losartan (COZAAR) 100 MG tablet Take 100 mg by mouth daily at bedtime, Historical Med      losartan-hydrochlorothiazide (HYZAAR) 100-25 MG per tablet Take 1 tablet by mouth as needed, Historical Med      metoprolol succinate (TOPROL-XL) 50 mg 24 hr tablet 50 mg daily, Starting Fri 8/11/2023, Historical Med      metoprolol tartrate (LOPRESSOR) 50 mg tablet Take 50 mg by mouth every 12 (twelve) hours, Starting Tue 12/6/2022, Historical Med      Multiple Vitamins-Minerals (ALIVE WOMENS 50+ GUMMY PO) Take by mouth, Historical Med      oxyCODONE (Roxicodone) 5 immediate release tablet Take 1 tablet (5 mg total) by mouth every 4 (four) hours as needed for moderate pain Max Daily Amount: 30 mg, Starting Fri 5/31/2024, Normal      Synthroid 88 MCG tablet Starting Wed 8/2/2023, Historical Med             ED SEPSIS DOCUMENTATION   Time reflects when diagnosis was documented in both MDM as applicable and the Disposition within this note       Time User Action Codes Description Comment    10/15/2024 10:08  AM Alanna Hoffman [S93.401A] Right ankle sprain     10/15/2024 10:13 AM Alanna Hoffman [M21.70] Leg length discrepancy                  Alanna Hoffman DO  10/15/24 1813

## 2024-10-18 ENCOUNTER — OFFICE VISIT (OUTPATIENT)
Dept: OBGYN CLINIC | Facility: CLINIC | Age: 74
End: 2024-10-18
Payer: MEDICARE

## 2024-10-18 VITALS
WEIGHT: 213 LBS | BODY MASS INDEX: 39.2 KG/M2 | SYSTOLIC BLOOD PRESSURE: 139 MMHG | HEART RATE: 87 BPM | DIASTOLIC BLOOD PRESSURE: 76 MMHG | HEIGHT: 62 IN

## 2024-10-18 DIAGNOSIS — M21.70 LEG LENGTH DISCREPANCY: ICD-10-CM

## 2024-10-18 DIAGNOSIS — S93.401A RIGHT ANKLE SPRAIN: Primary | ICD-10-CM

## 2024-10-18 PROCEDURE — 99213 OFFICE O/P EST LOW 20 MIN: CPT | Performed by: STUDENT IN AN ORGANIZED HEALTH CARE EDUCATION/TRAINING PROGRAM

## 2024-10-18 NOTE — PROGRESS NOTES
Assessment:   Diagnosis ICD-10-CM Associated Orders   1. Right ankle sprain  S93.401A Ambulatory Referral to Orthopedic Surgery     Ambulatory Referral to Physical Therapy      2. Leg length discrepancy  M21.70 Ambulatory Referral to Orthopedic Surgery          Plan:  Right ankle sprain, tibialis anterior    The patient's x-rays, history, physical exam were reviewed with her in clinic.  It was discussed that she has a right ankle sprain, with associated edema, affecting the tibialis anterior.  It was discussed that she can take anti-inflammatory medication as needed.  It was also discussed that she can attend formal physical therapy.  It was discussed that she may transition out of her cam boot at this time.  She will follow-up with the clinic in 6 weeks for a recheck.      Assessment & Plan  Right ankle sprain    Orders:    Ambulatory Referral to Orthopedic Surgery    Ambulatory Referral to Physical Therapy; Future    Leg length discrepancy    Orders:    Ambulatory Referral to Orthopedic Surgery        To do next visit:  Return in about 6 weeks (around 11/29/2024).    The above stated was discussed in layman's terms and the patient expressed understanding.  All questions were answered to the patient's satisfaction.       Scribe Attestation      I,:   am acting as a scribe while in the presence of the attending physician.:       I,:   personally performed the services described in this documentation    as scribed in my presence.:               Subjective:   Heather Holt is a 74 y.o. female who presents for right ankle evaluation.  She states that on 10/15/2024 she tripped while in the kitchen, landing on her right ankle.  She states she does not remember what way her ankle rolled.  She states following this she had significant pain as well as swelling in the right ankle.  She states her pain has started to decrease, but swelling has been consistent, and has slightly increased over the past day.  She was seen in  the ED on 10/15/2024 where she was put in a cam boot.  She states that she has been using this for long distance walking, but not in the house.  She denies any previous major injuries to this ankle.  She does state that she has a leg length discrepancy due to a hip replacement, and wears a lift in her left shoe.  She has tried conservative management such as ice, ibuprofen, and rest.  She presents to clinic for further evaluation.      Review of systems negative unless otherwise specified in HPI  Review of Systems    Past Medical History:   Diagnosis Date    Anxiety     Fibromyalgia     H/O cardiovascular stress test     H/O echocardiogram     HBP (high blood pressure)     Hypothyroidism     IBS (irritable bowel syndrome)     PONV (postoperative nausea and vomiting)     Vascular disease     Varicose veins       Past Surgical History:   Procedure Laterality Date    CARDIAC CATHETERIZATION      EF 50%. No significant CAD.     SECTION      CHOLECYSTECTOMY      HERNIA REPAIR      [facility] ARTHRP KNE CONDYLE&PLATU MEDIAL&LAT COMPARTMENTS Left 2024    Procedure: ARTHROPLASTY KNEE TOTAL;  Surgeon: Sonia Real MD;  Location:  MAIN OR;  Service: Orthopedics    REPLACEMENT TOTAL KNEE Right 2013    TOTAL HIP ARTHROPLASTY Right 2019    VEIN SURGERY Bilateral      Dr Castrejon       Family History   Problem Relation Age of Onset    Cancer Mother     Heart failure Mother     Heart disease Mother     Lung cancer Father     Hypertension Sister     Breast cancer Sister         over 50    No Known Problems Sister     No Known Problems Sister     No Known Problems Sister     Lung cancer Sister     Cancer Sister     Skin cancer Sister     No Known Problems Daughter     No Known Problems Maternal Grandmother     No Known Problems Maternal Grandfather     No Known Problems Paternal Grandmother     No Known Problems Paternal Grandfather     Hypertension Brother     Heart disease Brother     Cancer Brother      Prostate cancer Brother     No Known Problems Brother     No Known Problems Brother     No Known Problems Brother     No Known Problems Son     No Known Problems Son     Coronary artery disease Other        Social History     Occupational History    Occupation: Retired   Tobacco Use    Smoking status: Never    Smokeless tobacco: Never    Tobacco comments:     pt quit in her 30s   Vaping Use    Vaping status: Never Used   Substance and Sexual Activity    Alcohol use: Never    Drug use: Never    Sexual activity: Not Currently     Birth control/protection: Post-menopausal         Current Outpatient Medications:     calcium carbonate (OS-INGRID) 600 MG tablet, Take 600 mg by mouth 3 (three) times a week, Disp: , Rfl:     Cholecalciferol (Vitamin D) 50 MCG (2000 UT) tablet, Take 2,000 Units by mouth 3 (three) times a week, Disp: , Rfl:     esomeprazole (NexIUM) 20 mg capsule, Take 20 mg by mouth every evening, Disp: , Rfl:     famotidine (PEPCID) 20 mg tablet, , Disp: , Rfl:     Krill Oil Omega-3 500 MG CAPS, Take 350 mg by mouth every other day, Disp: , Rfl:     latanoprost (XALATAN) 0.005 % ophthalmic solution, 1 drop daily at bedtime, Disp: , Rfl:     Linzess 72 MCG CAPS, 72 mcg daily in the early morning , Disp: , Rfl:     losartan (COZAAR) 100 MG tablet, Take 100 mg by mouth daily at bedtime, Disp: , Rfl:     metoprolol succinate (TOPROL-XL) 50 mg 24 hr tablet, 50 mg daily, Disp: , Rfl:     Multiple Vitamins-Minerals (ALIVE WOMENS 50+ GUMMY PO), Take by mouth, Disp: , Rfl:     Synthroid 88 MCG tablet, , Disp: , Rfl:     acetaminophen (TYLENOL) 500 mg tablet, Take 500 mg by mouth every 6 (six) hours as needed for mild pain, Disp: , Rfl:     amoxicillin (AMOXIL) 500 mg capsule, Take 2,000 mg by mouth 60 minutes pre-procedure (Patient not taking: Reported on 3/28/2023), Disp: , Rfl:     ascorbic acid (VITAMIN C) 500 MG tablet, Take 1 tablet (500 mg total) by mouth 2 (two) times a day, Disp: 60 tablet, Rfl: 1     aspirin (ECOTRIN LOW STRENGTH) 81 mg EC tablet, Take 1 tablet (81 mg total) by mouth 2 (two) times a day, Disp: 70 tablet, Rfl: 0    dexlansoprazole (Dexilant) 60 MG capsule, Take 60 mg by mouth daily, Disp: , Rfl:     ferrous sulfate 324 (65 Fe) mg, TAKE 1 TABLET BY MOUTH 2 TIMES A DAY BEFORE MEALS, Disp: 60 tablet, Rfl: 1    folic acid (FOLVITE) 1 mg tablet, TAKE 1 TABLET BY MOUTH EVERY DAY (Patient not taking: Reported on 6/24/2024), Disp: 90 tablet, Rfl: 1    losartan-hydrochlorothiazide (HYZAAR) 100-25 MG per tablet, Take 1 tablet by mouth as needed (Patient not taking: Reported on 10/21/2022), Disp: , Rfl:     metoprolol tartrate (LOPRESSOR) 50 mg tablet, Take 50 mg by mouth every 12 (twelve) hours (Patient not taking: Reported on 8/15/2023), Disp: , Rfl:     oxyCODONE (Roxicodone) 5 immediate release tablet, Take 1 tablet (5 mg total) by mouth every 4 (four) hours as needed for moderate pain Max Daily Amount: 30 mg, Disp: 40 tablet, Rfl: 0    Allergies   Allergen Reactions    Escitalopram Diarrhea and GI Intolerance    Food Throat Swelling     FAT FREE SALAD DRESSING    Hydrocodone-Acetaminophen Other (See Comments)     Heat intolerance, flushing    Lactose Intolerance [Tilactase] Abdominal Pain    Meloxicam Other (See Comments)     Other reaction(s): nose bleeds    Methylprednisolone Other (See Comments)     Red face    Niacin Facial Swelling     Other reaction(s): Unknown Allergic Reaction    Norco [Hydrocodone-Acetaminophen] Other (See Comments)     Heat intolerance, flushing    Gabapentin Anxiety            Vitals:    10/18/24 1114   BP: 139/76   Pulse: 87       Body mass index is 38.96 kg/m².  Wt Readings from Last 3 Encounters:   10/18/24 96.6 kg (213 lb)   06/24/24 96.6 kg (213 lb)   06/12/24 95.3 kg (210 lb)       Objective:                    Ortho Exam    Pain with anterior tibialis MMT   Moderate edema   Tibialis anterior TTP   Small cyst formation near the tibialis anterior   NVI distally   "    Diagnostics, reviewed and taken today if performed as documented:    None performed      I have personally reviewed the pertinent films in PACS and I interpretation is as follows: 3 views of the ankle, 3 views of the foot, and 2 views of the the fibula were reviewed in clinic today from 10/15/2024.  Views demonstrate no acute osseous abnormalities.       Procedures, if performed today:    Procedures    None performed      Portions of the record may have been created with voice recognition software.  Occasional wrong word or \"sound a like\" substitutions may have occurred due to the inherent limitations of voice recognition software.  Read the chart carefully and recognize, using context, where substitutions have occurred.   "

## 2024-10-28 ENCOUNTER — EVALUATION (OUTPATIENT)
Dept: PHYSICAL THERAPY | Facility: MEDICAL CENTER | Age: 74
End: 2024-10-28
Payer: MEDICARE

## 2024-10-28 DIAGNOSIS — S93.491D SPRAIN OF ANTERIOR TALOFIBULAR LIGAMENT OF RIGHT ANKLE, SUBSEQUENT ENCOUNTER: Primary | ICD-10-CM

## 2024-10-28 DIAGNOSIS — S93.401A RIGHT ANKLE SPRAIN: ICD-10-CM

## 2024-10-28 PROCEDURE — 97161 PT EVAL LOW COMPLEX 20 MIN: CPT | Performed by: PHYSICAL THERAPIST

## 2024-10-28 PROCEDURE — 97110 THERAPEUTIC EXERCISES: CPT | Performed by: PHYSICAL THERAPIST

## 2024-10-28 NOTE — PROGRESS NOTES
"PT Evaluation     Today's date: 10/28/2024  Patient name: Heather Holt  : 1950  MRN: [id number]  Referring provider: Neeraj Reese MD  Dx:   Encounter Diagnosis     ICD-10-CM    1. Sprain of anterior talofibular ligament of right ankle, subsequent encounter  S93.491D       2. Right ankle sprain  S93.401A Ambulatory Referral to Physical Therapy                     Assessment  Impairments: abnormal gait, abnormal muscle firing, abnormal or restricted ROM, activity intolerance, impaired balance, impaired physical strength, lacks appropriate home exercise program and pain with function  Symptom irritability: low    Assessment details: Pt is an alert and oriented 75 yo female, referred to out-pt PT with dx of R ankle sprain.  Pt states she stepped hard on her ankle and went to ED on 10/15/24.  Pt states she had bruising.  X-rays were normal and was placed in a walking boot.  Pt then referred to ortho, and per his notes pt can begin to transition out of boot and WBAT.  Pt states minimal pain, however has \"shooting pain\" in ant shin region with standing/walking.   Upon examination, pt demonstrates pain, tissue tenderness, decreased R ankle strength and ROM, gait deficits, and functional limitations.  Pt has f/u with podiatrist on 24 and will proceed as advised.  Thank you for your referral.  Understanding of Dx/Px/POC: good     Prognosis: good    Goals  ST: Decrease pain by 25% in 4 weeks with all functional activities  2: Improved ROM by 25% in 4 weeks to assist with all functional activities  3: Improve strength by 1/2 grade in 4 weeks to assist with all functional activities  LT:  Decrease pain to 0-1/10 with all functional activities in 4-6 weeks  2: Improved ROM to WFL's in 4-6 weeks to assist with all functional activities  3: Increase strength to WFL's in 4-6 weeks to assist with all functional activities     Plan  Patient would benefit from: PT eval and skilled physical " therapy  Planned modality interventions: cryotherapy    Planned therapy interventions: IASTM, joint mobilization, kinesiology taping, manual therapy, neuromuscular re-education, patient/caregiver education, strengthening, stretching, therapeutic activities, therapeutic exercise, IADL retraining, home exercise program, gait training, functional ROM exercises and flexibility    Frequency: 2x week  Duration in weeks: 6  Plan of Care beginning date: 10/28/2024  Plan of Care expiration date: 2024  Treatment plan discussed with: patient      Subjective Evaluation    Patient Goals  Patient goals for therapy: increased strength, independence with ADLs/IADLs, increased motion, decreased pain and decreased edema    Pain  Current pain ratin  At best pain ratin  At worst pain ratin  Location: R ankle  Quality: dull ache  Relieving factors: rest    Social Support  Steps to enter house: yes  Stairs in house: yes       Diagnostic Tests  X-ray: normal  Treatments  Current treatment: physical therapy      Objective     Observations     Additional Observation Details  Observation:  73 yo female, mild swelling/possibly cyst formation R ant ankle region, (-) redness/bruising    Gait:  Pt amb ind without AD, mild R lat trunk shift    Palpation     Additional Palpation Details  Palpation:  pain/tenderness R ant tib, sinus tarsi region, palpable dense cyst ant ankle     Neurological Testing     Sensation     Ankle/Foot   Left Ankle/Foot   Intact: light touch    Right Ankle/Foot   Intact: light touch     Active Range of Motion     Right Ankle/Foot   Dorsiflexion (ke): 2 degrees with pain  Plantar flexion: 40 degrees   Inversion: 30 degrees   Eversion: 20 degrees     Strength/Myotome Testing     Right Ankle/Foot   Dorsiflexion: 4  Plantar flexion: 4+  Inversion: 4+  Eversion: 4+             Precautions:  HTN, OA B knees, lumbar radicluopathy, GERD, hypothroid      Manuals 10/28                                                                 Neuro Re-Ed 10/28            Seated BAPS             Seated HT raises             SLS                                                                 Ther Ex 10/28            bike             HR             squats             DF strap stretch HEP            TB 4-way             Ankle ROM 4-way HEP                                      Ther Activity                                       Gait Training                                       Modalities 10/28            CP

## 2024-12-05 ENCOUNTER — OFFICE VISIT (OUTPATIENT)
Dept: OBGYN CLINIC | Facility: CLINIC | Age: 74
End: 2024-12-05
Payer: MEDICARE

## 2024-12-05 VITALS
BODY MASS INDEX: 39.2 KG/M2 | DIASTOLIC BLOOD PRESSURE: 91 MMHG | WEIGHT: 213 LBS | SYSTOLIC BLOOD PRESSURE: 150 MMHG | HEIGHT: 62 IN | HEART RATE: 67 BPM

## 2024-12-05 DIAGNOSIS — M67.471 GANGLION CYST OF RIGHT FOOT: ICD-10-CM

## 2024-12-05 DIAGNOSIS — M76.811 ANTERIOR TIBIALIS TENDINITIS, RIGHT: Primary | ICD-10-CM

## 2024-12-05 PROCEDURE — 99213 OFFICE O/P EST LOW 20 MIN: CPT | Performed by: STUDENT IN AN ORGANIZED HEALTH CARE EDUCATION/TRAINING PROGRAM

## 2024-12-06 NOTE — PROGRESS NOTES
Ortho Sports Medicine Follow-Up Ankle Visit    Assesment:  74 y.o. female right ankle tibialis anterior tendon strain, resolving, as well as tib ant tendon possible ganglion cyst    Plan:    No additional workup is necessary at this time.  We had a long discussion in the office about the nature of her condition and his treatment options - both operative and nonoperative.  She has responded well to PT and RICE. Regarding the cyst, we discussed no need for further workup at this time if it is not significantly bothering her. We discussed red flags to look out for including significant pain, skin changes, increased size, systematic symptoms. We discussed the use of oral anti-inflammatory medications and physical therapy.  She will continue with home exercises. She will return to the office as needed for reevaluation. She verbalized her understanding of the diagnosis and treatment plan.  All of her questions were answered in detail.       Conservative treatment:    PT for ROM/strengthening to knee, hip and core.  OTC NSAIDS prn for pain.  Let pain guide gradual return activities.    Imaging:    All imaging from today was reviewed by myself and explained to the patient.       Injection:    No Injection planned at this time.      Surgery:     No surgery is recommended at this point, continue with conservative treatment plan as noted.      Follow up:    As needed        Chief Complaint   Patient presents with    Right Ankle - Follow-up       History of Present Illness:      The patient is returns for follow up of her right ankle.  Since the prior visit, She reports significant improvement.     Pain is located anterior. Her pain is mostly gone however she does still have that dense cyst in her tib ant tendon at her anterior ankle. No numbness / tingling / skin changes / recent enlargement of the cyst.     Pain is improved by rest, ice, and NSAIDS.  Symptoms include swelling.     The patient has tried rest, ice, and NSAIDS.     She did a session of PT and has been doing exercises at home.    I have reviewed the past medical, surgical, social and family history, medications and allergies as documented in the EMR.    Ankle Surgical History:  None    Past Medical, Social and Family History:  Past Medical History:   Diagnosis Date    Anxiety     Fibromyalgia     H/O cardiovascular stress test     H/O echocardiogram     HBP (high blood pressure)     Hypothyroidism     IBS (irritable bowel syndrome)     PONV (postoperative nausea and vomiting)     Vascular disease     Varicose veins     Past Surgical History:   Procedure Laterality Date    CARDIAC CATHETERIZATION      EF 50%. No significant CAD.     SECTION      CHOLECYSTECTOMY      HERNIA REPAIR      [place] ARTHRP KNE CONDYLE&PLATU MEDIAL&LAT COMPARTMENTS Left 2024    Procedure: ARTHROPLASTY KNEE TOTAL;  Surgeon: Sonia Real MD;  Location:  MAIN OR;  Service: Orthopedics    REPLACEMENT TOTAL KNEE Right 2013    TOTAL HIP ARTHROPLASTY Right 2019    VEIN SURGERY Bilateral      Dr Castrejon     Allergies   Allergen Reactions    Escitalopram Diarrhea and GI Intolerance    Food Throat Swelling     FAT FREE SALAD DRESSING    Hydrocodone-Acetaminophen Other (See Comments)     Heat intolerance, flushing    Lactose Intolerance [Tilactase] Abdominal Pain    Meloxicam Other (See Comments)     Other reaction(s): nose bleeds    Methylprednisolone Other (See Comments)     Red face    Niacin Facial Swelling     Other reaction(s): Unknown Allergic Reaction    Norco [Hydrocodone-Acetaminophen] Other (See Comments)     Heat intolerance, flushing    Gabapentin Anxiety     Current Outpatient Medications on File Prior to Visit   Medication Sig Dispense Refill    calcium carbonate (OS-INGRID) 600 MG tablet Take 600 mg by mouth 3 (three) times a week      Cholecalciferol (Vitamin D) 50 MCG ( UT) tablet Take 2,000 Units by mouth 3 (three) times a week      esomeprazole (NexIUM) 20  mg capsule Take 20 mg by mouth every evening      famotidine (PEPCID) 20 mg tablet       Krill Oil Omega-3 500 MG CAPS Take 350 mg by mouth every other day      latanoprost (XALATAN) 0.005 % ophthalmic solution 1 drop daily at bedtime      Linzess 72 MCG CAPS 72 mcg daily in the early morning       losartan (COZAAR) 100 MG tablet Take 100 mg by mouth daily at bedtime      metoprolol succinate (TOPROL-XL) 50 mg 24 hr tablet 50 mg daily      Multiple Vitamins-Minerals (ALIVE WOMENS 50+ GUMMY PO) Take by mouth      Synthroid 88 MCG tablet       acetaminophen (TYLENOL) 500 mg tablet Take 500 mg by mouth every 6 (six) hours as needed for mild pain      amoxicillin (AMOXIL) 500 mg capsule Take 2,000 mg by mouth 60 minutes pre-procedure (Patient not taking: Reported on 3/28/2023)      ascorbic acid (VITAMIN C) 500 MG tablet Take 1 tablet (500 mg total) by mouth 2 (two) times a day 60 tablet 1    aspirin (ECOTRIN LOW STRENGTH) 81 mg EC tablet Take 1 tablet (81 mg total) by mouth 2 (two) times a day 70 tablet 0    dexlansoprazole (Dexilant) 60 MG capsule Take 60 mg by mouth daily      ferrous sulfate 324 (65 Fe) mg TAKE 1 TABLET BY MOUTH 2 TIMES A DAY BEFORE MEALS 60 tablet 1    folic acid (FOLVITE) 1 mg tablet TAKE 1 TABLET BY MOUTH EVERY DAY (Patient not taking: Reported on 6/24/2024) 90 tablet 1    losartan-hydrochlorothiazide (HYZAAR) 100-25 MG per tablet Take 1 tablet by mouth as needed (Patient not taking: Reported on 10/21/2022)      metoprolol tartrate (LOPRESSOR) 50 mg tablet Take 50 mg by mouth every 12 (twelve) hours (Patient not taking: Reported on 8/15/2023)      oxyCODONE (Roxicodone) 5 immediate release tablet Take 1 tablet (5 mg total) by mouth every 4 (four) hours as needed for moderate pain Max Daily Amount: 30 mg 40 tablet 0     No current facility-administered medications on file prior to visit.     Social History     Socioeconomic History    Marital status: /Civil Union     Spouse name: Not on  file    Number of children: Not on file    Years of education: Not on file    Highest education level: Not on file   Occupational History    Occupation: Retired   Tobacco Use    Smoking status: Never    Smokeless tobacco: Never    Tobacco comments:     pt quit in her 30s   Vaping Use    Vaping status: Never Used   Substance and Sexual Activity    Alcohol use: Never    Drug use: Never    Sexual activity: Not Currently     Birth control/protection: Post-menopausal   Other Topics Concern    Not on file   Social History Narrative    Most recent tobacco use screening: 10-    Alcohol intake: None    Marital status:     Live alone or with others: with others    Occupation: retired     Social Drivers of Health     Financial Resource Strain: Not on file   Food Insecurity: No Food Insecurity (5/31/2024)    Nursing - Inadequate Food Risk Classification     Worried About Running Out of Food in the Last Year: Never true     Ran Out of Food in the Last Year: Never true     Ran Out of Food in the Last Year: Not on file   Transportation Needs: No Transportation Needs (5/31/2024)    PRAPARE - Transportation     Lack of Transportation (Medical): No     Lack of Transportation (Non-Medical): No   Physical Activity: Not on file   Stress: Not on file   Social Connections: Not on file   Intimate Partner Violence: Not on file   Housing Stability: Low Risk  (5/31/2024)    Housing Stability Vital Sign     Unable to Pay for Housing in the Last Year: No     Number of Times Moved in the Last Year: 0     Homeless in the Last Year: No         I have reviewed the past medical, surgical, social and family history, medications and allergies as documented in the EMR.    Review of systems: ROS is negative other than that noted in the HPI.  Constitutional: Negative for fatigue and fever.   HENT: Negative for sore throat.    Respiratory: Negative for shortness of breath.    Cardiovascular: Negative for chest pain.   Gastrointestinal:  "Negative for abdominal pain.   Endocrine: Negative for cold intolerance and heat intolerance.   Genitourinary: Negative for flank pain.   Musculoskeletal: Negative for back pain.   Skin: Negative for rash.   Allergic/Immunologic: Negative for immunocompromised state.   Neurological: Negative for dizziness.   Psychiatric/Behavioral: Negative for agitation.      Physical Exam:    Blood pressure 150/91, pulse 67, height 5' 2\" (1.575 m), weight 96.6 kg (213 lb).    General/Constitutional: NAD, well developed, well nourished  HENT: Normocephalic, atraumatic  CV: Intact distal pulses, regular rate  Resp: No respiratory distress or labored breathing  Lymphatic: No lymphadenopathy palpated  Neuro: Alert and Oriented x 3, no focal deficits  Psych: Normal mood, normal affect, normal judgement, normal behavior  Skin: Warm, dry, no rashes, no erythema       Ankle Examination (focused):    RIGHT    ROM:  DF 10 PF 45    Palpation:  Non-tender to palpation over medial malleolus, lateral malleolus, anterior and posterior ankle    Anterior Drawer negative    Calcaneal inversion negative    Squeeze Test negative    5/5 strength tib ant tendon, no pain with resisted strength testing     No pain with palpation or range of motion of midfoot and forefoot bilaterally    No limp upon gait exam    No calf tenderness to palpation bilaterally    LE NV Exam: +2 DP/PT pulses bilaterally  Sensation intact to light touch L2-S1 bilaterally      Ankle Imaging:  No new imaging today  "

## 2025-01-03 ENCOUNTER — TELEPHONE (OUTPATIENT)
Age: 75
End: 2025-01-03

## 2025-01-03 DIAGNOSIS — Z12.31 ENCOUNTER FOR SCREENING MAMMOGRAM FOR MALIGNANT NEOPLASM OF BREAST: Primary | ICD-10-CM

## 2025-01-03 NOTE — TELEPHONE ENCOUNTER
Patient called, requested screening mammogram script.     Pt yearly scheduled first available with Marissa Pearson, 7/11/25. Pt aware to schedule mammogram date after 4/10.

## 2025-02-27 ENCOUNTER — APPOINTMENT (OUTPATIENT)
Dept: LAB | Facility: MEDICAL CENTER | Age: 75
End: 2025-02-27
Payer: MEDICARE

## 2025-02-27 DIAGNOSIS — R74.8 ELEVATED LIVER ENZYMES: ICD-10-CM

## 2025-02-27 LAB
ALBUMIN SERPL BCG-MCNC: 4 G/DL (ref 3.5–5)
ALP SERPL-CCNC: 112 U/L (ref 34–104)
ALT SERPL W P-5'-P-CCNC: 16 U/L (ref 7–52)
AST SERPL W P-5'-P-CCNC: 25 U/L (ref 13–39)
BILIRUB DIRECT SERPL-MCNC: 0.23 MG/DL (ref 0–0.2)
BILIRUB SERPL-MCNC: 1.24 MG/DL (ref 0.2–1)
PROT SERPL-MCNC: 7 G/DL (ref 6.4–8.4)

## 2025-02-27 PROCEDURE — 80076 HEPATIC FUNCTION PANEL: CPT

## 2025-02-27 PROCEDURE — 36415 COLL VENOUS BLD VENIPUNCTURE: CPT

## 2025-04-11 ENCOUNTER — HOSPITAL ENCOUNTER (OUTPATIENT)
Dept: RADIOLOGY | Facility: MEDICAL CENTER | Age: 75
Discharge: HOME | End: 2025-04-11
Payer: MEDICARE

## 2025-04-11 VITALS — BODY MASS INDEX: 37.39 KG/M2 | HEIGHT: 63 IN | WEIGHT: 211 LBS

## 2025-04-11 DIAGNOSIS — Z12.31 ENCOUNTER FOR SCREENING MAMMOGRAM FOR MALIGNANT NEOPLASM OF BREAST: ICD-10-CM

## 2025-04-11 PROCEDURE — 77067 SCR MAMMO BI INCL CAD: CPT

## 2025-04-11 PROCEDURE — 77063 BREAST TOMOSYNTHESIS BI: CPT

## 2025-04-22 ENCOUNTER — RESULTS FOLLOW-UP (OUTPATIENT)
Dept: OBGYN CLINIC | Facility: MEDICAL CENTER | Age: 75
End: 2025-04-22

## 2025-05-20 ENCOUNTER — HOSPITAL ENCOUNTER (OUTPATIENT)
Dept: RADIOLOGY | Facility: HOSPITAL | Age: 75
Discharge: HOME | End: 2025-05-20
Attending: ORTHOPAEDIC SURGERY
Payer: MEDICARE

## 2025-05-20 ENCOUNTER — OFFICE VISIT (OUTPATIENT)
Dept: OBGYN CLINIC | Facility: CLINIC | Age: 75
End: 2025-05-20
Payer: MEDICARE

## 2025-05-20 DIAGNOSIS — G89.29 CHRONIC RADICULAR PAIN OF LOWER EXTREMITY: Primary | ICD-10-CM

## 2025-05-20 DIAGNOSIS — M54.10 CHRONIC RADICULAR PAIN OF LOWER EXTREMITY: Primary | ICD-10-CM

## 2025-05-20 DIAGNOSIS — Z96.652 S/P TOTAL KNEE ARTHROPLASTY, LEFT: ICD-10-CM

## 2025-05-20 DIAGNOSIS — M25.552 LEFT HIP PAIN: ICD-10-CM

## 2025-05-20 DIAGNOSIS — M47.816 LUMBAR SPONDYLOSIS: ICD-10-CM

## 2025-05-20 DIAGNOSIS — M21.70 LEG LENGTH DISCREPANCY: ICD-10-CM

## 2025-05-20 PROCEDURE — 73521 X-RAY EXAM HIPS BI 2 VIEWS: CPT

## 2025-05-20 PROCEDURE — 73562 X-RAY EXAM OF KNEE 3: CPT

## 2025-05-20 PROCEDURE — 99214 OFFICE O/P EST MOD 30 MIN: CPT | Performed by: ORTHOPAEDIC SURGERY

## 2025-05-20 NOTE — PROGRESS NOTES
"Assessment:  Assessment & Plan  S/P total knee arthroplasty, left  New xrays of the left knee were obtained and reviewed with the patient.   On exam, she has full ROM, mild tenderness at the pes anserine bursa  Reminded to take patient to take antibiotics prior to any dental cleanings or procedures.  Antibiotic prophylaxis sheet was placed in her AVS  We will follow-up in 1 year with new x-rays      Orders:    XR knee 3 vw left non injury; Future    Left hip pain  X-rays bilateral hips were obtained today and reviewed noting that the left hip joint is well-preserved.  On exam she has full nonpainful range of motion  Discussed with the patient that at this time we would not indicate providing a total hip to bring her out to length purely because of leg length discrepancy.  A total hip arthroplasty is primarily done for pain.  And due to her not having pain in the hip or restriction, she is not a  left hip total arthroplasty candidate.  Orders:    XR hips bilateral 2 vw w pelvis if performed; Future    Leg length discrepancy  Gait continues to be off even with the 1/2\"  shoe insert fabricate [employer].   Reviewed CT scan noting that patient is when it shorter and at her previous visit recommended a three-quarter inch shoe insert.  Recommend the patient to go back to [facility] for an adjustment or can go to  clinic with a new prescription.  New orthotic prescription was placed in with the suggested measurements based off of the CT scanogram.    Orders:    Ambulatory referral to Spine & Pain Management; Future    Ambulatory referral to Orthotist/Prosthetist; Future    Lumbar spondylosis  Referral to Dr. Bhagat for evaluation and treatment for her known lumbar stenosis and active right-sided sciatica.  On exam patient has positive straight leg raise with numbness on the posterior thigh.    Orders:    Ambulatory referral to Spine & Pain Management; Future    Ambulatory referral to Orthotist/Prosthetist; " Future    Chronic radicular pain of lower extremity       Will have patient follow-up with pain management for evaluation of radicular symptoms.  I do believe that patient's symptoms may be exacerbated by her leg length discrepancy status post total hip arthroplasty on the right side  Will follow-up with patient within the next 3 to 4 months to see if surgical intervention is required on the left side.  I do believe that a shoe lift may help relieve the symptoms if it is the correct size        To do next visit:  Return in about 1 year (around 5/20/2026) for Left total knee.    The above stated was discussed in layman's terms and the patient expressed understanding.  All questions were answered to the patient's satisfaction.       Scribe Attestation      I,:  Yanique Barros am acting as a scribe while in the presence of the attending physician.:       I,:  Sonia Real MD personally performed the services described in this documentation    as scribed in my presence.:               Subjective:   Heather Holt is a 74 y.o. female who presents today for a 9-month follow-up for her left total knee arthroplasty performed, 5/20/2024. At times she has some groin pain, left side. She notes that her right sided sciatic pain is worse. She does have numbness that goes into the right groin, down the posterior right leg.   Patient has hx of Right posterior total hip arthroplasty in 2019 by Dr. Ramirez . Patent states she feels her left leg is shorter and feels she walks uneven when walking.   Hx of a right total knee arthroplasty.       Review of systems negative unless otherwise specified in HPI  Review of Systems   Constitutional:  Negative for chills, fever and unexpected weight change.   HENT:  Negative for hearing loss, nosebleeds and sore throat.    Eyes:  Negative for pain, redness and visual disturbance.   Respiratory:  Negative for cough, shortness of breath and wheezing.    Cardiovascular:  Negative for  chest pain, palpitations and leg swelling.   Gastrointestinal:  Negative for abdominal pain and nausea.   Genitourinary:  Negative for dyspareunia, dysuria and frequency.   Skin:  Negative for rash and wound.   Neurological:  Negative for dizziness, numbness and headaches.   Psychiatric/Behavioral:  Negative for decreased concentration and suicidal ideas. The patient is not nervous/anxious.        Past Medical History:   Diagnosis Date    Anxiety     Fibromyalgia     H/O cardiovascular stress test     H/O echocardiogram     HBP (high blood pressure)     Hypothyroidism     IBS (irritable bowel syndrome)     PONV (postoperative nausea and vomiting)     Vascular disease     Varicose veins       Past Surgical History:   Procedure Laterality Date    CARDIAC CATHETERIZATION      EF 50%. No significant CAD.     SECTION      CHOLECYSTECTOMY      HERNIA REPAIR      MA ARTHRP KNE CONDYLE&PLATU MEDIAL&LAT COMPARTMENTS Left 2024    Procedure: ARTHROPLASTY KNEE TOTAL;  Surgeon: Sonia Real MD;  Location:  MAIN OR;  Service: Orthopedics    REPLACEMENT TOTAL KNEE Right 2013    TOTAL HIP ARTHROPLASTY Right 2019    VEIN SURGERY Bilateral      Dr Castrejon       Family History   Problem Relation Age of Onset    Cancer Mother     Heart failure Mother     Heart disease Mother     Lung cancer Father     Hypertension Sister     Breast cancer Sister         over 50    No Known Problems Sister     No Known Problems Sister     No Known Problems Sister     Lung cancer Sister     Cancer Sister     Skin cancer Sister     No Known Problems Daughter     No Known Problems Maternal Grandmother     No Known Problems Maternal Grandfather     No Known Problems Paternal Grandmother     No Known Problems Paternal Grandfather     Hypertension Brother     Heart disease Brother     Cancer Brother     Prostate cancer Brother     No Known Problems Brother     No Known Problems Brother     No Known Problems Brother      "No Known Problems Son     No Known Problems Son     Coronary artery disease Other        Social History     Occupational History    Occupation: Retired   Tobacco Use    Smoking status: Never    Smokeless tobacco: Never    Tobacco comments:     pt quit in her 30s   Vaping Use    Vaping status: Never Used   Substance and Sexual Activity    Alcohol use: Never    Drug use: Never    Sexual activity: Not Currently     Birth control/protection: Post-menopausal       Current Medications[1]    Allergies[2]       There were no vitals filed for this visit.    There is no height or weight on file to calculate BMI.  Wt Readings from Last 3 Encounters:   04/11/25 95.7 kg (211 lb)   12/05/24 96.6 kg (213 lb)   10/18/24 96.6 kg (213 lb)       Objective:                    Left Knee Exam     Tenderness   The patient is experiencing no tenderness.     Range of Motion   Extension:  0   Flexion:  120     Tests   Varus: negative Valgus: negative    Other   Erythema: absent  Sensation: normal  Pulse: present  Swelling: none  Effusion: no effusion present    Comments:  Calf is soft and non tender  NVI    + SLR            Diagnostics, reviewed and taken today if performed as documented:    None performed:    None performed but reviewed:      The attending physician has personally reviewed the pertinent films in PACS and interpretation is as follows:  Xrays of the bilateral hip/pelvis 2-3 views: Prosthesis is in good anatomic position no signs of loosening, fracture or dislocation of right hip, well-maintained joint space of left hip joint  Xrays of the left knee 3 views: Prosthesis is in good anatomic position no signs of loosening or fracture    Procedures, if performed today:    Procedures    None performed      Portions of the record may have been created with voice recognition software.  Occasional wrong word or \"sound a like\" substitutions may have occurred due to the inherent limitations of voice recognition software.  Read the chart " carefully and recognize, using context, where substitutions have occurred.         [1]   Current Outpatient Medications:     acetaminophen (TYLENOL) 500 mg tablet, Take 500 mg by mouth every 6 (six) hours as needed for mild pain, Disp: , Rfl:     amoxicillin (AMOXIL) 500 mg capsule, Take 2,000 mg by mouth 60 minutes pre-procedure (Patient not taking: Reported on 3/28/2023), Disp: , Rfl:     ascorbic acid (VITAMIN C) 500 MG tablet, Take 1 tablet (500 mg total) by mouth 2 (two) times a day, Disp: 60 tablet, Rfl: 1    aspirin (ECOTRIN LOW STRENGTH) 81 mg EC tablet, Take 1 tablet (81 mg total) by mouth 2 (two) times a day, Disp: 70 tablet, Rfl: 0    calcium carbonate (OS-INGRID) 600 MG tablet, Take 600 mg by mouth 3 (three) times a week, Disp: , Rfl:     Cholecalciferol (Vitamin D) 50 MCG (2000 UT) tablet, Take 2,000 Units by mouth 3 (three) times a week, Disp: , Rfl:     dexlansoprazole (Dexilant) 60 MG capsule, Take 60 mg by mouth daily, Disp: , Rfl:     esomeprazole (NexIUM) 20 mg capsule, Take 20 mg by mouth every evening, Disp: , Rfl:     famotidine (PEPCID) 20 mg tablet, , Disp: , Rfl:     ferrous sulfate 324 (65 Fe) mg, TAKE 1 TABLET BY MOUTH 2 TIMES A DAY BEFORE MEALS, Disp: 60 tablet, Rfl: 1    folic acid (FOLVITE) 1 mg tablet, TAKE 1 TABLET BY MOUTH EVERY DAY (Patient not taking: Reported on 6/24/2024), Disp: 90 tablet, Rfl: 1    Krill Oil Omega-3 500 MG CAPS, Take 350 mg by mouth every other day, Disp: , Rfl:     latanoprost (XALATAN) 0.005 % ophthalmic solution, 1 drop daily at bedtime, Disp: , Rfl:     Linzess 72 MCG CAPS, 72 mcg daily in the early morning , Disp: , Rfl:     losartan (COZAAR) 100 MG tablet, Take 100 mg by mouth daily at bedtime, Disp: , Rfl:     losartan-hydrochlorothiazide (HYZAAR) 100-25 MG per tablet, Take 1 tablet by mouth as needed (Patient not taking: Reported on 10/21/2022), Disp: , Rfl:     metoprolol succinate (TOPROL-XL) 50 mg 24 hr tablet, 50 mg daily, Disp: , Rfl:     metoprolol  tartrate (LOPRESSOR) 50 mg tablet, Take 50 mg by mouth every 12 (twelve) hours (Patient not taking: Reported on 8/15/2023), Disp: , Rfl:     Multiple Vitamins-Minerals (ALIVE WOMENS 50+ GUMMY PO), Take by mouth, Disp: , Rfl:     oxyCODONE (Roxicodone) 5 immediate release tablet, Take 1 tablet (5 mg total) by mouth every 4 (four) hours as needed for moderate pain Max Daily Amount: 30 mg, Disp: 40 tablet, Rfl: 0    Synthroid 88 MCG tablet, , Disp: , Rfl:   [2]   Allergies  Allergen Reactions    Escitalopram Diarrhea and GI Intolerance    Food Throat Swelling     FAT FREE SALAD DRESSING    Hydrocodone-Acetaminophen Other (See Comments)     Heat intolerance, flushing    Lactose Intolerance [Tilactase] Abdominal Pain    Meloxicam Other (See Comments)     Other reaction(s): nose bleeds    Methylprednisolone Other (See Comments)     Red face    Niacin Facial Swelling     Other reaction(s): Unknown Allergic Reaction    Norco [Hydrocodone-Acetaminophen] Other (See Comments)     Heat intolerance, flushing    Gabapentin Anxiety

## 2025-05-20 NOTE — ASSESSMENT & PLAN NOTE
Referral to Dr. Bhagat for evaluation and treatment for her known lumbar stenosis and active right-sided sciatica.  On exam patient has positive straight leg raise with numbness on the posterior thigh.    Orders:    Ambulatory referral to Spine & Pain Management; Future    Ambulatory referral to Orthotist/Prosthetist; Future

## 2025-05-20 NOTE — PATIENT INSTRUCTIONS
[facility]: Prosthetics & Orthotics    [address] [facility] [address], [place], [place] [address] [phone]      Heather Holt  1950    [facility]                                                                                               Dr. Sonia Real                                                                                                            ANTIBIOTIC USE FOR JOINT REPLACEMENT PATIENTS  You have had surgery to replace one of your joints with a metal prosthesis. Going forward, you should take oral antibiotics before any dental work, including routine cleanings. These procedures are a potential source of injection. If you develop an infection, it could spread to your operative joint and cause complications.  Please show the following guidelines to your medical doctor and dentist, so he/she can prescribe the appropriate medications.  The following information is recommended by the American Heart, Dental, and Orthopedic Associations:  STANDARD GENERAL PROPHYLAXIS  antibiotic prophylaxis recommended lifetime  Recommend refraining from dental procedures until 3 months post operatively  Amoxicillin for Adults:    500mg - Take 4 capsules (2 grams) orally one hour before the procedure  If allergic to Penicillin  Clindamycin for Adults:   300mg - Take 2 capsules (600 mg) orally one hour before the procedure  Azithromycin for Adults:  250mg - Take 2 capsules (500 mg) orally one hour before the procedure  Cephalexin for Adults:     500mg - Take 4 capsules (2 grams) orally one hour before the procedure  Note: Cephalosporins should not be used if you have ever developed an immediate hypersensitivity reaction (i.e., hives, swelling, severe itching, difficulty breathing) to Penicillin  Please feel free to contact our office at [phone] with questions or concerns.

## 2025-05-20 NOTE — ASSESSMENT & PLAN NOTE
"Gait continues to be off even with the 1/2\"  shoe insert fabricate [employer].   Reviewed CT scan noting that patient is when it shorter and at her previous visit recommended a three-quarter inch shoe insert.  Recommend the patient to go back to [facility] for an adjustment or can go to  clinic with a new prescription.  New orthotic prescription was placed in with the suggested measurements based off of the CT scanogram.    Orders:    Ambulatory referral to Spine & Pain Management; Future    Ambulatory referral to Orthotist/Prosthetist; Future    "

## 2025-06-16 ENCOUNTER — CONSULT (OUTPATIENT)
Dept: PAIN MEDICINE | Facility: CLINIC | Age: 75
End: 2025-06-16
Payer: MEDICARE

## 2025-06-16 VITALS — BODY MASS INDEX: 38.64 KG/M2 | WEIGHT: 210 LBS | HEIGHT: 62 IN

## 2025-06-16 DIAGNOSIS — E66.01 MORBID OBESITY DUE TO EXCESS CALORIES (HCC): ICD-10-CM

## 2025-06-16 DIAGNOSIS — M54.16 LUMBAR RADICULOPATHY: Primary | ICD-10-CM

## 2025-06-16 PROCEDURE — 99204 OFFICE O/P NEW MOD 45 MIN: CPT | Performed by: PAIN MEDICINE

## 2025-06-16 NOTE — PROGRESS NOTES
"Name: Heather Holt      : 1950      MRN: [id number]  Encounter Provider: Gilberto Bhagat MD  Encounter Date: 2025   Encounter department: [facility]  :  Assessment & Plan  Lumbar radiculopathy    Orders:  •  MRI lumbar spine wo contrast; Future  Heather is a pleasant 74-year-old female history of low back and right leg pain ongoing for the past 1 year increasing in severity pain is in the distribution of L4-5 L5-S1 will obtain an MRI of her lumbar spine to review further.  Morbid obesity due to excess calories (HCC)                 My impressions and treatment recommendations were discussed in detail with the patient who verbalized understanding and had no further questions.  Discharge instructions were provided. I personally saw and examined the patient and I agree with the above discussed plan of care.    History of Present Illness     Heather Holt is a 74 y.o. female presenting [facility] pain chief complaint of low back right leg pain in the distribution of L4-5 L5-S1 she has a status post right hip surgery but has been having issues with right low back and right buttocks and leg pain ongoing for the past 1 year increasing in severity she reports numbness and tingling in the bottom of her foot on the right side but no significant symptoms on the left side.  Pain can increase to 7 out of 10 depending on her activity level.  She is on physical therapy with minimal response in her pain symptoms.    Review of Systems  Medical History Reviewed by provider this encounter:     .       Objective   Ht 5' 2\" (1.575 m)   Wt 95.3 kg (210 lb)   BMI 38.41 kg/m²         Physical Exam  Lumbar Spine:  No masses or atrophy,    Range of motion - Decreased extension/flexion  Palpation - Tenderness to palpation in the lumbar parapsinals right  PSIS tenderness right +   Anin's/MARILEE right +  Gaenslen's right +  SLR right +       Strength Right Left   Hip flexion L1,2 5 " 5   Knee extension L3 5 5   Ankle dorsiflexion L4 5 5   Great toe extension L5 5 5   Ankle Plantarflexion S1 5 5       Sensory Exam:  intact to light touch bilateral LE       Reflexes:     Right Left   Biceps 2+ 2+   Triceps 2+ 2+   Brachioradialis 2+ 2+   Patellar 2+ 2+   Achilles 2+ 2+   Babinski neg neg        Gait antalgic  Xray hip    FINDINGS:  The bony pelvis appears intact.   Degenerative narrowing of the pubic symphysis  Degenerative changes visualized lower lumbar spine.      LEFT HIP:  There is no acute fracture or dislocation.  No significant hip degenerative changes.  No lytic or blastic osseous lesion.  Soft tissues are unremarkable.     RIGHT HIP:  There is no acute fracture or dislocation.  Status post right hip arthroplasty. Anatomic alignment. No loosening or heterotopic bone formation.  No lytic or blastic osseous lesion.  Soft tissues are unremarkable. Surgical clips are seen overlying the inferior pubic rami bilaterally     IMPRESSION:        No acute osseous abnormality of the left hip.  Satisfactory appearing right hip arthroplasty as above.

## 2025-06-26 ENCOUNTER — APPOINTMENT (OUTPATIENT)
Dept: LAB | Facility: MEDICAL CENTER | Age: 75
End: 2025-06-26
Attending: PHYSICIAN ASSISTANT
Payer: MEDICARE

## 2025-06-26 DIAGNOSIS — E78.00 HYPERCHOLESTEROLEMIA: ICD-10-CM

## 2025-06-26 LAB
CHOLEST SERPL-MCNC: 176 MG/DL (ref ?–200)
HDLC SERPL-MCNC: 52 MG/DL
LDLC SERPL CALC-MCNC: 107 MG/DL (ref 0–100)
NONHDLC SERPL-MCNC: 124 MG/DL
TRIGL SERPL-MCNC: 86 MG/DL (ref ?–150)

## 2025-06-26 PROCEDURE — 36415 COLL VENOUS BLD VENIPUNCTURE: CPT

## 2025-06-26 PROCEDURE — 80061 LIPID PANEL: CPT

## 2025-07-07 ENCOUNTER — HOSPITAL ENCOUNTER (OUTPATIENT)
Dept: MRI IMAGING | Facility: HOSPITAL | Age: 75
Discharge: HOME | End: 2025-07-07
Attending: PAIN MEDICINE
Payer: MEDICARE

## 2025-07-07 DIAGNOSIS — M54.16 LUMBAR RADICULOPATHY: ICD-10-CM

## 2025-07-07 PROCEDURE — 72148 MRI LUMBAR SPINE W/O DYE: CPT

## 2025-07-10 ENCOUNTER — RESULTS FOLLOW-UP (OUTPATIENT)
Dept: PAIN MEDICINE | Facility: CLINIC | Age: 75
End: 2025-07-10

## 2025-07-11 ENCOUNTER — APPOINTMENT (OUTPATIENT)
Dept: LAB | Facility: MEDICAL CENTER | Age: 75
End: 2025-07-11
Attending: NURSE PRACTITIONER
Payer: MEDICARE

## 2025-07-11 ENCOUNTER — ANNUAL EXAM (OUTPATIENT)
Dept: OBGYN CLINIC | Facility: MEDICAL CENTER | Age: 75
End: 2025-07-11
Payer: MEDICARE

## 2025-07-11 VITALS — BODY MASS INDEX: 38.74 KG/M2 | SYSTOLIC BLOOD PRESSURE: 136 MMHG | WEIGHT: 211.8 LBS | DIASTOLIC BLOOD PRESSURE: 74 MMHG

## 2025-07-11 DIAGNOSIS — Z01.419 ENCNTR FOR GYN EXAM (GENERAL) (ROUTINE) W/O ABN FINDINGS: Primary | ICD-10-CM

## 2025-07-11 DIAGNOSIS — Z78.0 ENCOUNTER FOR OSTEOPOROSIS SCREENING IN ASYMPTOMATIC POSTMENOPAUSAL PATIENT: ICD-10-CM

## 2025-07-11 DIAGNOSIS — N81.4 CYSTOCELE WITH UTERINE PROLAPSE: ICD-10-CM

## 2025-07-11 DIAGNOSIS — N39.41 URGE INCONTINENCE OF URINE: ICD-10-CM

## 2025-07-11 DIAGNOSIS — Z12.31 ENCOUNTER FOR SCREENING MAMMOGRAM FOR MALIGNANT NEOPLASM OF BREAST: ICD-10-CM

## 2025-07-11 DIAGNOSIS — R30.0 DYSURIA: ICD-10-CM

## 2025-07-11 DIAGNOSIS — Z13.820 ENCOUNTER FOR OSTEOPOROSIS SCREENING IN ASYMPTOMATIC POSTMENOPAUSAL PATIENT: ICD-10-CM

## 2025-07-11 LAB
BACTERIA UR QL AUTO: ABNORMAL /HPF
BILIRUB UR QL STRIP: NEGATIVE
CLARITY UR: CLEAR
COLOR UR: YELLOW
GLUCOSE UR STRIP-MCNC: NEGATIVE MG/DL
HGB UR QL STRIP.AUTO: ABNORMAL
KETONES UR STRIP-MCNC: NEGATIVE MG/DL
LEUKOCYTE ESTERASE UR QL STRIP: NEGATIVE
MUCOUS THREADS UR QL AUTO: ABNORMAL
NITRITE UR QL STRIP: NEGATIVE
NON-SQ EPI CELLS URNS QL MICRO: ABNORMAL /HPF
PH UR STRIP.AUTO: 5.5 [PH]
PROT UR STRIP-MCNC: ABNORMAL MG/DL
RBC #/AREA URNS AUTO: ABNORMAL /HPF
SP GR UR STRIP.AUTO: 1.02 (ref 1–1.03)
UROBILINOGEN UR STRIP-ACNC: <2 MG/DL
WBC #/AREA URNS AUTO: ABNORMAL /HPF

## 2025-07-11 PROCEDURE — G0101 CA SCREEN;PELVIC/BREAST EXAM: HCPCS | Performed by: NURSE PRACTITIONER

## 2025-07-11 PROCEDURE — 87086 URINE CULTURE/COLONY COUNT: CPT

## 2025-07-11 PROCEDURE — 81001 URINALYSIS AUTO W/SCOPE: CPT

## 2025-07-11 NOTE — PROGRESS NOTES
Name: Heather Holt      : 1950      MRN: [id number]  Encounter Provider: SUSANA Perez  Encounter Date: 2025   Encounter department: [facility] [place]  :  Assessment & Plan  Encntr for gyn exam (general) (routine) w/o abn findings  Calcium 9211-7817 mg (in divided doses-max 600 mg at one time) + 800-1000 IU Vit D daily unless otherwise directed. Avoid falls.   Exercise 150-300 minutes per week minimum including weight bearing exercises. DEXA scan- ordered     Call your insurance company to verify coverage prior to completing any ordered tests.    No further paps after age 65 as long as there has been adequate normal paps completed, no history of KI 2  or a more severe pap diagnosis in the last 20 years.     Annual mammogram ordered and monthly breast self exam recommended .     Colonoscopy-Up to date           Kegels 20 times twice daily. Vaginal moisturizers twice weekly as needed.   Referred to pelvic floor PT. Call to schedule.   Return to office in one year or sooner, if needed.        Encounter for screening mammogram for malignant neoplasm of breast  Mammogram ordered. Call to schedule.   Orders:    Mammo screening bilateral w 3d and cad; Future    Encounter for osteoporosis screening in asymptomatic postmenopausal patient  DEXA ordered. Call to schedule.   Orders:    DXA bone density spine hip and pelvis; Future    Cystocele with uterine prolapse  Stable  Referred to pelvic floor PT. Call to schedule.   Orders:    Ambulatory referral to Physical Therapy; Future    Urge incontinence of urine  Referred to pelvic floor PT.   Orders:    Ambulatory referral to Physical Therapy; Future    Dysuria  UA UC ordered.   Orders:    Urinalysis with microscopic; Future    Urine culture; Future        History of Present Illness   HPI  Heather Holt is a 74 y.o.  (52 yo boy, 49 yo boy, 43 yo girl) female who is here today for her annual visit.  No gynecologic health concerns.   She was evaluated in ED yesterday for palpitations. Stable findings. She feels it may have been her anxiety.   She does follow with a cardiologist.  She does check her BP at home.     She believes she may have a UTI as she has c/o dysuria x 4 days and lower pelvic pressure. Unable to leave a sample today.     Last in office on 1/10/23 with Dr Bowser for her pessary insertion. (#4 ring support) this pessary fell out and she has not returned for re fitting. Notes say she did not tolerate the #5 ring support pessary. She is able to reduce her prolapse to void.   Menopausal with no vaginal bleeding.   Exercise- 5 days per week.     Heather Holt is sexually active with male partner/  of 54 years. He has prostate problems. Feels safe in this relationship. Denies vaginal pain,bleeding or dryness.    She is not interested in STD screening today.   She denies vaginal discharge, itching or pelvic pain.   She has no urinary concerns, does have urge incontinence.  No bowel concerns.  No breast concerns.     Last pap: 10/04/2022 normal with negative HR HPV   Mammogram: 04/11/2025 normal   Colonoscopy:  3/25 with no recall.  DEXA scan: 11/28/2022 osteoporosis    Family history of cancer:   Cancer-related family history includes Breast cancer in her sister; Cancer in her brother, brother, father, mother, sister, and sister; Lung cancer in her father and sister; Prostate cancer in her brother; Skin cancer in her sister.    History obtained from: patient    Review of Systems   Constitutional: Negative.  Negative for activity change, appetite change, chills, diaphoresis, fatigue, fever and unexpected weight change.   HENT:  Negative for congestion, dental problem, sneezing, sore throat and trouble swallowing.    Eyes:  Negative for visual disturbance.   Respiratory:  Negative for chest tightness and shortness of breath.    Cardiovascular:  Negative for chest pain and leg swelling.    Gastrointestinal:  Negative for abdominal pain, constipation, diarrhea, nausea and vomiting.   Genitourinary:  Positive for dysuria. Negative for difficulty urinating, flank pain, frequency, hematuria, pelvic pain, urgency, vaginal bleeding, vaginal discharge and vaginal pain.        Reducible uterine prolapse  Urge incontinence   Musculoskeletal:  Negative for back pain, gait problem (unsteady getting up onto exam table) and neck pain.   Skin: Negative.    Allergic/Immunologic: Negative.    Neurological:  Negative for headaches.   Hematological:  Negative for adenopathy.   Psychiatric/Behavioral: Negative.       Medical History Reviewed by provider this encounter:  Tobacco  Allergies  Meds  Problems  Med Hx  Surg Hx  Fam Hx     .  Medications Ordered Prior to Encounter[1]   Social History[2]     Objective   /74 (BP Location: Left arm, Patient Position: Sitting, Cuff Size: Standard)   Wt 96.1 kg (211 lb 12.8 oz)   BMI 38.74 kg/m²      Physical Exam  Vitals and nursing note reviewed.   Constitutional:       Appearance: Normal appearance. She is well-developed.      Comments: BMI 38   HENT:      Head: Normocephalic.   Neck:      Thyroid: No thyromegaly.     Cardiovascular:      Rate and Rhythm: Normal rate and regular rhythm.      Heart sounds: Murmur heard.   Pulmonary:      Effort: Pulmonary effort is normal.      Breath sounds: Normal breath sounds.   Chest:   Breasts:     Breasts are symmetrical.      Right: Normal. No inverted nipple, mass, nipple discharge, skin change or tenderness.      Left: Normal. No inverted nipple, mass, nipple discharge, skin change or tenderness.   Abdominal:      Palpations: Abdomen is soft.   Genitourinary:     General: Normal vulva.      Exam position: Lithotomy position.      Labia:         Right: No rash, tenderness, lesion or injury.         Left: No rash, tenderness, lesion or injury.       Urethra: No prolapse, urethral pain, urethral swelling or urethral  lesion.      Vagina: No signs of injury and foreign body. No vaginal discharge, erythema, tenderness, bleeding, lesions or prolapsed vaginal walls.      Cervix: Normal.      Uterus: With uterine prolapse (with cystocele; reducible and stable).       Adnexa: Right adnexa normal and left adnexa normal.        Right: No mass, tenderness or fullness.          Left: No mass, tenderness or fullness.        Rectum: No external hemorrhoid.      Comments: Vulvovaginal atrophy    Musculoskeletal:         General: Normal range of motion.      Cervical back: Normal range of motion.   Lymphadenopathy:      Head:      Right side of head: No submental, submandibular, tonsillar or occipital adenopathy.      Left side of head: No submental, submandibular, tonsillar or occipital adenopathy.      Upper Body:      Right upper body: No supraclavicular or axillary adenopathy.      Left upper body: No supraclavicular or axillary adenopathy.      Lower Body: No right inguinal adenopathy. No left inguinal adenopathy.     Skin:     General: Skin is warm and dry.     Neurological:      Mental Status: She is alert and oriented to person, place, and time.     Psychiatric:         Mood and Affect: Mood normal.         Behavior: Behavior normal. Behavior is cooperative.                [1]   Current Outpatient Medications on File Prior to Visit   Medication Sig Dispense Refill    calcium carbonate (OS-INGRID) 600 MG tablet Take 600 mg by mouth 3 (three) times a week      Cholecalciferol (Vitamin D) 50 MCG (2000 UT) tablet Take 2,000 Units by mouth 3 (three) times a week      esomeprazole (NexIUM) 20 mg capsule Take 20 mg by mouth every evening      famotidine (PEPCID) 20 mg tablet       Krill Oil Omega-3 500 MG CAPS Take 350 mg by mouth every other day      latanoprost (XALATAN) 0.005 % ophthalmic solution 1 drop daily at bedtime      losartan (COZAAR) 100 MG tablet Take 100 mg by mouth daily at bedtime      metoprolol succinate (TOPROL-XL) 50 mg 24  hr tablet 50 mg in the morning.      Multiple Vitamins-Minerals (ALIVE WOMENS 50+ GUMMY PO) Take by mouth      Synthroid 88 MCG tablet       [DISCONTINUED] acetaminophen (TYLENOL) 500 mg tablet Take 500 mg by mouth every 6 (six) hours as needed for mild pain      [DISCONTINUED] amoxicillin (AMOXIL) 500 mg capsule Take 2,000 mg by mouth 60 minutes pre-procedure (Patient not taking: Reported on 3/28/2023)      [DISCONTINUED] ascorbic acid (VITAMIN C) 500 MG tablet Take 1 tablet (500 mg total) by mouth 2 (two) times a day 60 tablet 1    [DISCONTINUED] aspirin (ECOTRIN LOW STRENGTH) 81 mg EC tablet Take 1 tablet (81 mg total) by mouth 2 (two) times a day 70 tablet 0    [DISCONTINUED] dexlansoprazole (Dexilant) 60 MG capsule Take 60 mg by mouth daily      [DISCONTINUED] ferrous sulfate 324 (65 Fe) mg TAKE 1 TABLET BY MOUTH 2 TIMES A DAY BEFORE MEALS 60 tablet 1    [DISCONTINUED] folic acid (FOLVITE) 1 mg tablet TAKE 1 TABLET BY MOUTH EVERY DAY (Patient not taking: Reported on 6/24/2024) 90 tablet 1    [DISCONTINUED] Linzess 72 MCG CAPS 72 mcg daily in the early morning (Patient not taking: Reported on 7/11/2025)      [DISCONTINUED] losartan-hydrochlorothiazide (HYZAAR) 100-25 MG per tablet Take 1 tablet by mouth as needed (Patient not taking: Reported on 10/21/2022)      [DISCONTINUED] metoprolol tartrate (LOPRESSOR) 50 mg tablet Take 50 mg by mouth every 12 (twelve) hours (Patient not taking: Reported on 8/15/2023)      [DISCONTINUED] oxyCODONE (Roxicodone) 5 immediate release tablet Take 1 tablet (5 mg total) by mouth every 4 (four) hours as needed for moderate pain Max Daily Amount: 30 mg 40 tablet 0     Current Facility-Administered Medications on File Prior to Visit   Medication Dose Route Frequency Provider Last Rate Last Admin    [DISCONTINUED] sodium chloride 0.9 % infusion  20 mL Intravenous  Generic External Data Provider       [2]   Social History  Tobacco Use    Smoking status: Former     Types: Cigarettes     Smokeless tobacco: Never    Tobacco comments:     pt quit in her 30s   Vaping Use    Vaping status: Never Used   Substance and Sexual Activity    Alcohol use: No    Drug use: No    Sexual activity: Not Currently     Birth control/protection: Post-menopausal

## 2025-07-11 NOTE — TELEPHONE ENCOUNTER
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

## 2025-07-11 NOTE — PATIENT INSTRUCTIONS
Calcium 3904-5154 mg (in divided doses-max 600 mg at one time) + 800-1000 IU Vit D daily unless otherwise directed. Avoid falls.   Exercise 150-300 minutes per week minimum including weight bearing exercises. DEXA scan- ordered     Call your insurance company to verify coverage prior to completing any ordered tests.    No further paps after age 65 as long as there has been adequate normal paps completed, no history of KI 2  or a more severe pap diagnosis in the last 20 years.     Annual mammogram ordered and monthly breast self exam recommended .     Colonoscopy-Up to date           Kegels 20 times twice daily. Vaginal moisturizers twice weekly as needed.   Referred to pelvic floor PT. Call to schedule.   Stable cystocele  Return to office in one year or sooner, if needed.

## 2025-07-12 LAB — BACTERIA UR CULT: NORMAL

## 2025-07-29 ENCOUNTER — HOSPITAL ENCOUNTER (OUTPATIENT)
Dept: RADIOLOGY | Facility: MEDICAL CENTER | Age: 75
Discharge: HOME | End: 2025-07-29
Attending: NURSE PRACTITIONER
Payer: MEDICARE

## 2025-07-29 DIAGNOSIS — Z13.820 ENCOUNTER FOR OSTEOPOROSIS SCREENING IN ASYMPTOMATIC POSTMENOPAUSAL PATIENT: ICD-10-CM

## 2025-07-29 DIAGNOSIS — Z78.0 ENCOUNTER FOR OSTEOPOROSIS SCREENING IN ASYMPTOMATIC POSTMENOPAUSAL PATIENT: ICD-10-CM

## 2025-07-29 PROCEDURE — 77080 DXA BONE DENSITY AXIAL: CPT

## 2025-08-04 ENCOUNTER — OFFICE VISIT (OUTPATIENT)
Dept: PAIN MEDICINE | Facility: CLINIC | Age: 75
End: 2025-08-04
Payer: MEDICARE

## 2025-08-04 VITALS — HEIGHT: 62 IN | WEIGHT: 209 LBS | BODY MASS INDEX: 38.46 KG/M2

## 2025-08-04 DIAGNOSIS — M48.062 SPINAL STENOSIS OF LUMBAR REGION WITH NEUROGENIC CLAUDICATION: Primary | ICD-10-CM

## 2025-08-04 PROCEDURE — 99214 OFFICE O/P EST MOD 30 MIN: CPT | Performed by: PAIN MEDICINE

## 2025-08-04 RX ORDER — DULOXETIN HYDROCHLORIDE 20 MG/1
20 CAPSULE, DELAYED RELEASE ORAL DAILY
Qty: 30 CAPSULE | Refills: 1 | Status: SHIPPED | OUTPATIENT
Start: 2025-08-04

## 2025-08-22 ENCOUNTER — HOSPITAL ENCOUNTER (OUTPATIENT)
Dept: RADIOLOGY | Facility: HOSPITAL | Age: 75
Discharge: HOME | End: 2025-08-22
Attending: PAIN MEDICINE
Payer: MEDICARE

## 2025-08-22 VITALS
RESPIRATION RATE: 18 BRPM | SYSTOLIC BLOOD PRESSURE: 168 MMHG | OXYGEN SATURATION: 96 % | TEMPERATURE: 97.5 F | HEART RATE: 76 BPM | DIASTOLIC BLOOD PRESSURE: 74 MMHG

## 2025-08-22 DIAGNOSIS — M48.062 SPINAL STENOSIS OF LUMBAR REGION WITH NEUROGENIC CLAUDICATION: ICD-10-CM

## 2025-08-22 PROCEDURE — 64483 NJX AA&/STRD TFRM EPI L/S 1: CPT | Performed by: PAIN MEDICINE

## 2025-08-22 RX ORDER — PAPAVERINE HCL 150 MG
15 CAPSULE, EXTENDED RELEASE ORAL ONCE
Status: COMPLETED | OUTPATIENT
Start: 2025-08-22 | End: 2025-08-22

## 2025-08-22 RX ORDER — BUPIVACAINE HCL/PF 2.5 MG/ML
2 VIAL (ML) INJECTION ONCE
Status: COMPLETED | OUTPATIENT
Start: 2025-08-22 | End: 2025-08-22

## 2025-08-22 RX ADMIN — IOHEXOL 1 ML: 300 INJECTION, SOLUTION INTRAVENOUS at 11:00

## 2025-08-22 RX ADMIN — BUPIVACAINE HYDROCHLORIDE 2 ML: 2.5 INJECTION, SOLUTION EPIDURAL; INFILTRATION; INTRACAUDAL at 11:00

## 2025-08-22 RX ADMIN — DEXAMETHASONE SODIUM PHOSPHATE 15 MG: 10 INJECTION, SOLUTION INTRAMUSCULAR; INTRAVENOUS at 11:00

## (undated) DEVICE — PISTOL GRIP SKIN STAPLER: Brand: MULTIFIRE PREMIUM

## (undated) DEVICE — GLOVE SRG BIOGEL 8.5

## (undated) DEVICE — PROXIMATE SKIN STAPLERS (35 WIDE) CONTAINS 35 STAINLESS STEEL STAPLES (FIXED HEAD): Brand: PROXIMATE

## (undated) DEVICE — ABDOMINAL PAD: Brand: DERMACEA

## (undated) DEVICE — HANDPIECE SET WITH RETRACTABLE COAXIAL FAN SPRAY TIP AND SUCTION TUBE: Brand: INTERPULSE

## (undated) DEVICE — SUT VICRYL 1 CT-1 27 IN J261H

## (undated) DEVICE — COTTON ROLL,1 LB: Brand: CURITY

## (undated) DEVICE — COBAN 4 IN STERILE

## (undated) DEVICE — ACE WRAP 6 IN UNSTERILE

## (undated) DEVICE — 3 BONE CEMENT MIXER: Brand: MIXEVAC

## (undated) DEVICE — PADDING CAST 4 IN  COTTON STRL

## (undated) DEVICE — NEPTUNE E-SEP SMOKE EVACUATION PENCIL, COATED, 70MM BLADE, PUSH BUTTON SWITCH: Brand: NEPTUNE E-SEP

## (undated) DEVICE — SILVER-COATED ANTIMICROBIAL BARRIER DRESSING: Brand: ACTICOAT   4" X 8"

## (undated) DEVICE — SUT VICRYL 2-0 CT-1 27 IN J259H

## (undated) DEVICE — IMPERVIOUS STOCKINETTE: Brand: DEROYAL

## (undated) DEVICE — GAUZE SPONGES,16 PLY: Brand: CURITY

## (undated) DEVICE — HOOD: Brand: FLYTE, SURGICOOL

## (undated) DEVICE — SKN PRP WNG SPNGE PVP SCRB STR: Brand: MEDLINE INDUSTRIES, INC.

## (undated) DEVICE — TOWEL SET X-RAY

## (undated) DEVICE — PAD GROUNDING DUAL ADULT

## (undated) DEVICE — U-DRAPE: Brand: CONVERTORS

## (undated) DEVICE — RECIPROCATING BLADE, DOUBLE SIDED, OFFSET  (70.0 X 0.8 X 12.5MM)

## (undated) DEVICE — PAD CAST 6 IN COTTON NON STERILE

## (undated) DEVICE — NEEDLE HYPO 22G X 1-1/2 IN

## (undated) DEVICE — SPONGE SCRUB 4 PCT CHLORHEXIDINE

## (undated) DEVICE — BETHLEHEM UNIV TOTAL KNEE, KIT: Brand: CARDINAL HEALTH

## (undated) DEVICE — STIRRUP STRAP ADULT DISP

## (undated) DEVICE — TRAY FOLEY 16FR URIMETER SURESTEP

## (undated) DEVICE — GLOVE SRG BIOGEL 8

## (undated) DEVICE — CUFF TOURNIQUET 34 X 4 IN QUICK CONNECT DISP 1BLA

## (undated) DEVICE — CAPIT KNEE ATTUNE RP W/DOM

## (undated) DEVICE — GLOVE INDICATOR PI UNDERGLOVE SZ 8.5 BLUE

## (undated) DEVICE — COOL TEMP PAD

## (undated) DEVICE — NEEDLE 18 G X 1 1/2 SAFETY

## (undated) DEVICE — STERILE DOUBLE BASIN SET PACK: Brand: CARDINAL HEALTH

## (undated) DEVICE — 3M™ IOBAN™ 2 ANTIMICROBIAL INCISE DRAPE 6650EZ: Brand: IOBAN™ 2

## (undated) DEVICE — DUAL CUT SAGITTAL BLADE